# Patient Record
Sex: MALE | NOT HISPANIC OR LATINO | Employment: FULL TIME | URBAN - METROPOLITAN AREA
[De-identification: names, ages, dates, MRNs, and addresses within clinical notes are randomized per-mention and may not be internally consistent; named-entity substitution may affect disease eponyms.]

---

## 2023-08-22 ENCOUNTER — OFFICE VISIT (OUTPATIENT)
Dept: FAMILY MEDICINE CLINIC | Facility: CLINIC | Age: 43
End: 2023-08-22
Payer: COMMERCIAL

## 2023-08-22 VITALS
WEIGHT: 192 LBS | DIASTOLIC BLOOD PRESSURE: 70 MMHG | HEIGHT: 65 IN | BODY MASS INDEX: 31.99 KG/M2 | TEMPERATURE: 98 F | SYSTOLIC BLOOD PRESSURE: 118 MMHG

## 2023-08-22 DIAGNOSIS — F90.0 ATTENTION DEFICIT HYPERACTIVITY DISORDER (ADHD), PREDOMINANTLY INATTENTIVE TYPE: ICD-10-CM

## 2023-08-22 DIAGNOSIS — D22.9 NEVUS: ICD-10-CM

## 2023-08-22 DIAGNOSIS — Z00.00 ENCOUNTER FOR ANNUAL PHYSICAL EXAM: Primary | ICD-10-CM

## 2023-08-22 DIAGNOSIS — Z11.59 NEED FOR HEPATITIS C SCREENING TEST: ICD-10-CM

## 2023-08-22 DIAGNOSIS — Z12.11 ENCOUNTER FOR SCREENING FOR MALIGNANT NEOPLASM OF COLON: ICD-10-CM

## 2023-08-22 DIAGNOSIS — Z23 NEED FOR VACCINATION: ICD-10-CM

## 2023-08-22 DIAGNOSIS — N52.9 ERECTILE DYSFUNCTION, UNSPECIFIED ERECTILE DYSFUNCTION TYPE: ICD-10-CM

## 2023-08-22 RX ORDER — BUPRENORPHINE HYDROCHLORIDE AND NALOXONE HYDROCHLORIDE DIHYDRATE 8; 2 MG/1; MG/1
1 TABLET SUBLINGUAL DAILY
COMMUNITY

## 2023-08-22 RX ORDER — NALOXONE HYDROCHLORIDE 4 MG/.1ML
SPRAY NASAL
COMMUNITY
Start: 2023-07-13

## 2023-08-22 RX ORDER — BUPROPION HYDROCHLORIDE 150 MG/1
150 TABLET ORAL DAILY
Qty: 30 TABLET | Refills: 2 | Status: SHIPPED | OUTPATIENT
Start: 2023-08-22

## 2023-08-22 NOTE — PROGRESS NOTES
Subjective     Chief Complaint  Sexual Problem (Would like to discuss testosterone/ED/Would like to have some moles looked at and to discuss a colonoscopy and ADHD) and Immunizations (Tdap)    Subjective          Behzad Chi is a 43 y.o. male who presents today to [facility] for initial evaluation .    HPI:   History of Present Illness    Mr. Chi is a pleasant 43 year old male who presents today to establish care with a PCP.     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 He is still following with a suboxone clinic. Since being on the medication he has noted a severely decreased sex drive.     He reports some depressive symptoms that were improving since being out of long term for longer. He was also previously diagnosed with PTSD in the past. He has also had a previous diagnosis of ADHD when he was a kid. He is trying [facility] and has a lot of reading to do but this is difficult due to focus. He had issues with focus when he was in school, as well. When he is interested in a topic he has no trouble focusing and becomes hyper focused and has time blindness. He had one instance where he read a book for 22 hours straight. He also reports that he is concerned about a lot of the medications for ADHD due to his prior addictive potential.        The following portions of the patient's history were reviewed and updated as appropriate: allergies, current medications, past family history, past medical history, past social history, past surgical history and problem list.    Objective     Objective     Allergy:   No Known Allergies     Current Medications:   Current Outpatient Medications   Medication Sig Dispense Refill    buprenorphine-naloxone (SUBOXONE) 8-2 MG per SL tablet Place 1 tablet under the tongue Daily.      naloxone (NARCAN) 4 MG/0.1ML nasal spray       buPROPion XL (Wellbutrin XL) 150 MG  "24 hr tablet Take 1 tablet by mouth Daily. 30 tablet 2     No current facility-administered medications for this visit.       Past Medical History:  Past Medical History:   Diagnosis Date    ADHD     Depression     PTSD (post-traumatic stress disorder)        Past Surgical History:  Past Surgical History:   Procedure Laterality Date    APPENDECTOMY      CIRCUMCISION         Social History:  Social History     Socioeconomic History    Marital status: Legally    Tobacco Use    Smoking status: Every Day     Packs/day: 0.50     Years: 21.00     Pack years: 10.50     Types: Cigarettes     Passive exposure: Current    Smokeless tobacco: Never   Vaping Use    Vaping Use: Never used   Substance and Sexual Activity    Alcohol use: Never    Drug use: Never    Sexual activity: Defer       Family History:  Family History   Problem Relation Age of Onset    Diabetes Mother     Cancer Father     Other Father          Vital Signs:   /70   Temp 98 øF (36.7 øC) (Infrared)   Ht 165.1 cm (65\")   Wt 87.1 kg (192 lb)   BMI 31.95 kg/mý      Physical Exam:  Physical Exam  Constitutional:       Appearance: He is not ill-appearing.   Eyes:      Pupils: Pupils are equal, round, and reactive to light.   Cardiovascular:      Rate and Rhythm: Normal rate.      Pulses: Normal pulses.   Pulmonary:      Effort: Pulmonary effort is normal.      Breath sounds: Normal breath sounds.   Neurological:      General: No focal deficit present.      Mental Status: He is alert. Mental status is at baseline.   Psychiatric:         Mood and Affect: Mood normal.         Behavior: Behavior normal.         Thought Content: Thought content normal.         Judgment: Judgment normal.             PHQ-9 Score  PHQ-9 Total Score: 0     Lab Review  No visits with results within 3 Month(s) from this visit.   Latest known visit with results is:   No results found for any previous visit.        Radiology Results        Assessment / Plan         Assessment " and Plan   Diagnoses and all orders for this visit:    1. Encounter for annual physical exam (Primary)  Assessment & Plan:  Annual wellness exam completed today. Health Maintenance including immunizations was updated and reflected in the chart. Yearly screening labs were ordered.     Further recommendations to be given once lab data received.     Health advice: healthy food choices with fresh fruits and vegetables, maintain sleep pattern at least 8 hours, avoid texting and distracted driving practices; wear safety belt, engage in regular exercise, maintain healthy weight, use safe sex practices, avoid alcohol and illicit drugs. Maintain immunizations that are up to date. Maintain health maintenance: Colonoscopy, etc.  Follow up with PCP if struggling with depression or anxiety. Keep regular dental and eye exams. Brush and floss teeth daily.     F/U annually and prn.       Orders:  -     CBC & Differential; Future  -     Comprehensive Metabolic Panel; Future  -     Hemoglobin A1c; Future  -     TSH Rfx On Abnormal To Free T4; Future  -     Vitamin D,25-Hydroxy; Future  -     Vitamin B12; Future  -     Lipid Panel; Future    2. Need for vaccination  -     Tdap Vaccine Greater Than or Equal To 8yo IM    3. Encounter for screening for malignant neoplasm of colon  -     Ambulatory Referral For Screening Colonoscopy    4. Nevus  -     Ambulatory Referral to Dermatology    5. Erectile dysfunction, unspecified erectile dysfunction type  Assessment & Plan:  Possibly related to testosterone dysfunction.  Labs ordered for further evaluation    Orders:  -     Testosterone, Free, Total; Future  -     Testosterone; Future    6. Need for hepatitis C screening test  -     Hepatitis C antibody; Future    7. Attention deficit hyperactivity disorder (ADHD), predominantly inattentive type  Assessment & Plan:  Psychological condition is worsening.  Medication changes per orders.  Psychological condition  will be reassessed at the next  regular appointment.    Discussed with patient that Wellbutrin is proven effective in treatment of ADHD in adults.  Patient is not a good candidate for stimulants due to his history of substance abuse.  Medications and options discussed with patient in detail.  Patient voiced understanding of plan and was in agreement    Orders:  -     buPROPion XL (Wellbutrin XL) 150 MG 24 hr tablet; Take 1 tablet by mouth Daily.  Dispense: 30 tablet; Refill: 2        Discussed possible differential diagnoses, testing, treatment, recommended non-pharmacological interventions, risks, warning signs to monitor for that would indicate need for follow-up in clinic or ER. If no improvement with these regimens or you have new or worsening symptoms follow-up. Patient verbalizes understanding and agreement with plan of care. Denies further needs or concerns.     Patient was given instructions and counseling regarding her condition and for health maintenance advised.    BMI is >= 30 and <35. (Class 1 Obesity). The following options were offered after discussion;: weight loss educational material (shared in after visit summary)         Health Maintenance  Health Maintenance:   Health Maintenance Due   Topic Date Due    Pneumococcal Vaccine 0-64 (1 - PCV) Never done    HEPATITIS C SCREENING  Never done    COLORECTAL CANCER SCREENING  Never done        Meds ordered during this visit  New Medications Ordered This Visit   Medications    buPROPion XL (Wellbutrin XL) 150 MG 24 hr tablet     Sig: Take 1 tablet by mouth Daily.     Dispense:  30 tablet     Refill:  2       Meds stopped during this visit:  There are no discontinued medications.     Visit Diagnoses    ICD-10-CM ICD-9-CM   1. Encounter for annual physical exam  Z00.00 V70.0   2. Need for vaccination  Z23 V05.9   3. Encounter for screening for malignant neoplasm of colon  Z12.11 V76.51   4. Nevus  D22.9 216.9   5. Erectile dysfunction, unspecified erectile dysfunction type  N52.9 607.84    6. Need for hepatitis C screening test  Z11.59 V73.89   7. Attention deficit hyperactivity disorder (ADHD), predominantly inattentive type  F90.0 314.00       Patient was given instructions and counseling regarding his condition or for health maintenance advice. Please see specific information pulled into the AVS if appropriate.     Follow Up   Return in about 6 weeks (around 10/3/2023) for Recheck.        This document has been electronically signed by Cristiana Jackson DO   August 24, 2023 12:45 EDT    Dictated Utilizing Dragon Dictation: Part of this note may be an electronic transcription/translation of spoken language to printed text using the Dragon Dictation System.    Cristiana Jackson D.O.  [facility]

## 2023-08-24 ENCOUNTER — LAB (OUTPATIENT)
Dept: LAB | Facility: HOSPITAL | Age: 43
End: 2023-08-24
Payer: COMMERCIAL

## 2023-08-24 DIAGNOSIS — Z00.00 ENCOUNTER FOR ANNUAL PHYSICAL EXAM: ICD-10-CM

## 2023-08-24 DIAGNOSIS — Z11.59 NEED FOR HEPATITIS C SCREENING TEST: ICD-10-CM

## 2023-08-24 DIAGNOSIS — N52.9 ERECTILE DYSFUNCTION, UNSPECIFIED ERECTILE DYSFUNCTION TYPE: ICD-10-CM

## 2023-08-24 PROBLEM — F90.0 ATTENTION DEFICIT HYPERACTIVITY DISORDER (ADHD), PREDOMINANTLY INATTENTIVE TYPE: Status: ACTIVE | Noted: 2023-08-24

## 2023-08-24 LAB
25(OH)D3 SERPL-MCNC: 15.1 NG/ML (ref 30–100)
ALBUMIN SERPL-MCNC: 4.3 G/DL (ref 3.5–5.2)
ALBUMIN/GLOB SERPL: 1.9 G/DL
ALP SERPL-CCNC: 88 U/L (ref 39–117)
ALT SERPL W P-5'-P-CCNC: 32 U/L (ref 1–41)
ANION GAP SERPL CALCULATED.3IONS-SCNC: 12 MMOL/L (ref 5–15)
AST SERPL-CCNC: 24 U/L (ref 1–40)
BASOPHILS # BLD AUTO: 0.05 10*3/MM3 (ref 0–0.2)
BASOPHILS NFR BLD AUTO: 0.9 % (ref 0–1.5)
BILIRUB SERPL-MCNC: 0.3 MG/DL (ref 0–1.2)
BUN SERPL-MCNC: 10 MG/DL (ref 6–20)
BUN/CREAT SERPL: 10.4 (ref 7–25)
CALCIUM SPEC-SCNC: 9.3 MG/DL (ref 8.6–10.5)
CHLORIDE SERPL-SCNC: 104 MMOL/L (ref 98–107)
CHOLEST SERPL-MCNC: 225 MG/DL (ref 0–200)
CO2 SERPL-SCNC: 24 MMOL/L (ref 22–29)
CREAT SERPL-MCNC: 0.96 MG/DL (ref 0.76–1.27)
DEPRECATED RDW RBC AUTO: 38.9 FL (ref 37–54)
EGFRCR SERPLBLD CKD-EPI 2021: 100.6 ML/MIN/1.73
EOSINOPHIL # BLD AUTO: 0.09 10*3/MM3 (ref 0–0.4)
EOSINOPHIL NFR BLD AUTO: 1.5 % (ref 0.3–6.2)
ERYTHROCYTE [DISTWIDTH] IN BLOOD BY AUTOMATED COUNT: 12.9 % (ref 12.3–15.4)
GLOBULIN UR ELPH-MCNC: 2.3 GM/DL
GLUCOSE SERPL-MCNC: 102 MG/DL (ref 65–99)
HBA1C MFR BLD: 5.6 % (ref 4.8–5.6)
HCT VFR BLD AUTO: 39.9 % (ref 37.5–51)
HCV AB SER DONR QL: NORMAL
HDLC SERPL-MCNC: 43 MG/DL (ref 40–60)
HGB BLD-MCNC: 14.1 G/DL (ref 13–17.7)
IMM GRANULOCYTES # BLD AUTO: 0.02 10*3/MM3 (ref 0–0.05)
IMM GRANULOCYTES NFR BLD AUTO: 0.3 % (ref 0–0.5)
LDLC SERPL CALC-MCNC: 145 MG/DL (ref 0–100)
LDLC/HDLC SERPL: 3.28 {RATIO}
LYMPHOCYTES # BLD AUTO: 1.97 10*3/MM3 (ref 0.7–3.1)
LYMPHOCYTES NFR BLD AUTO: 33.6 % (ref 19.6–45.3)
MCH RBC QN AUTO: 30.2 PG (ref 26.6–33)
MCHC RBC AUTO-ENTMCNC: 35.3 G/DL (ref 31.5–35.7)
MCV RBC AUTO: 85.4 FL (ref 79–97)
MONOCYTES # BLD AUTO: 0.53 10*3/MM3 (ref 0.1–0.9)
MONOCYTES NFR BLD AUTO: 9 % (ref 5–12)
NEUTROPHILS NFR BLD AUTO: 3.21 10*3/MM3 (ref 1.7–7)
NEUTROPHILS NFR BLD AUTO: 54.7 % (ref 42.7–76)
NRBC BLD AUTO-RTO: 0 /100 WBC (ref 0–0.2)
PLATELET # BLD AUTO: 223 10*3/MM3 (ref 140–450)
PMV BLD AUTO: 10.1 FL (ref 6–12)
POTASSIUM SERPL-SCNC: 4.1 MMOL/L (ref 3.5–5.2)
PROT SERPL-MCNC: 6.6 G/DL (ref 6–8.5)
RBC # BLD AUTO: 4.67 10*6/MM3 (ref 4.14–5.8)
SODIUM SERPL-SCNC: 140 MMOL/L (ref 136–145)
TRIGL SERPL-MCNC: 205 MG/DL (ref 0–150)
TSH SERPL DL<=0.05 MIU/L-ACNC: 1.88 UIU/ML (ref 0.27–4.2)
VIT B12 BLD-MCNC: 677 PG/ML (ref 211–946)
VLDLC SERPL-MCNC: 37 MG/DL (ref 5–40)
WBC NRBC COR # BLD: 5.87 10*3/MM3 (ref 3.4–10.8)

## 2023-08-24 PROCEDURE — 80050 GENERAL HEALTH PANEL: CPT

## 2023-08-24 PROCEDURE — 86803 HEPATITIS C AB TEST: CPT

## 2023-08-24 PROCEDURE — 84402 ASSAY OF FREE TESTOSTERONE: CPT

## 2023-08-24 PROCEDURE — 82607 VITAMIN B-12: CPT

## 2023-08-24 PROCEDURE — 80061 LIPID PANEL: CPT

## 2023-08-24 PROCEDURE — 83036 HEMOGLOBIN GLYCOSYLATED A1C: CPT

## 2023-08-24 PROCEDURE — 36415 COLL VENOUS BLD VENIPUNCTURE: CPT

## 2023-08-24 PROCEDURE — 82306 VITAMIN D 25 HYDROXY: CPT

## 2023-08-24 PROCEDURE — 84403 ASSAY OF TOTAL TESTOSTERONE: CPT

## 2023-08-24 NOTE — ASSESSMENT & PLAN NOTE
Psychological condition is worsening.  Medication changes per orders.  Psychological condition  will be reassessed at the next regular appointment.    Discussed with patient that Wellbutrin is proven effective in treatment of ADHD in adults.  Patient is not a good candidate for stimulants due to his history of substance abuse.  Medications and options discussed with patient in detail.  Patient voiced understanding of plan and was in agreement

## 2023-08-24 NOTE — ASSESSMENT & PLAN NOTE
Annual wellness exam completed today. Health Maintenance including immunizations was updated and reflected in the chart. Yearly screening labs were ordered.     Further recommendations to be given once lab data received.     Health advice: healthy food choices with fresh fruits and vegetables, maintain sleep pattern at least 8 hours, avoid texting and distracted driving practices; wear safety belt, engage in regular exercise, maintain healthy weight, use safe sex practices, avoid alcohol and illicit drugs. Maintain immunizations that are up to date. Maintain health maintenance: Colonoscopy, etc.  Follow up with PCP if struggling with depression or anxiety. Keep regular dental and eye exams. Brush and floss teeth daily.     F/U annually and prn.

## 2023-08-30 ENCOUNTER — TELEPHONE (OUTPATIENT)
Dept: FAMILY MEDICINE CLINIC | Facility: CLINIC | Age: 43
End: 2023-08-30

## 2023-08-30 NOTE — TELEPHONE ENCOUNTER
Advised colonoscopy is waiting to be schedule with gastro, and derm [facility] to [facility], it does take some time to get in.

## 2023-08-30 NOTE — TELEPHONE ENCOUNTER
Caller: Behzad Chi    Relationship: Self    Best call back number:      Caller requesting test results: ALI    What test was performed: LABS    When was the test performed: [id number]    Where was the test performed: [facility]    Additional notes: PATIENT WOULD LIKE TO GO OVER RESULTS

## 2023-08-30 NOTE — TELEPHONE ENCOUNTER
Caller: Behzad Chi    Relationship: Self    Best call back number:      What is the medical concern/diagnosis: SCREENING COLONOSCOPY    What specialty or service is being requested: GASTROENTEROLOGY      Any additional details: PATIENT HASN'T HEARD ANYTHING BACK ON GETTING THIS SCHEDULED    ALSO PATIENT MENTIONED HE WAS TO HAVE A REFERRAL TO DERMATOLOGY TO HAVE SOME MOLES LOOKED AT AS WELL

## 2023-08-31 NOTE — TELEPHONE ENCOUNTER
"Provider: KATIANA JEAN DO    Caller: TONI DE DIOS    Relationship to Patient: SELF    Phone Number: [phone]     Reason for Call: RETURNING CALL TO THE OFFICE. HUB HAS RELAYED,   \"HUB TO READ\" MESSAGE TO PATIENT. PATIENT VERBALIZED UNDERSTANDING .    -PATIENT WANTED TO KNOW IF HE COULD TAKE A MULTIVITAMIN?    - PATIENT HAS BEEN HAVING ISSUES WITH HIS TESTOSTERONE AND WANTED TO KNOW WHAT HIS LEVELS WERE?  "

## 2023-08-31 NOTE — TELEPHONE ENCOUNTER
Okay for the Hub to read:     Labs were stable.  His cholesterol is a little elevated but not enough to need medication for at this time.  He should decrease his fried food intake etc.  We will repeat his labs in 6 months.  If the cholesterol still elevated then we will discuss medications at that time.  His vitamin D was also a little low.  He should take 2000 units of vitamin D daily, he can get this over-the-counter.      I called the pt and LVM advising to call the office.

## 2023-09-01 LAB
TESTOST FREE SERPL-MCNC: 4.2 PG/ML (ref 6.8–21.5)
TESTOST SERPL-MCNC: 222 NG/DL (ref 264–916)

## 2023-10-05 ENCOUNTER — TELEPHONE (OUTPATIENT)
Dept: FAMILY MEDICINE CLINIC | Facility: CLINIC | Age: 43
End: 2023-10-05

## 2023-10-06 ENCOUNTER — OFFICE VISIT (OUTPATIENT)
Dept: FAMILY MEDICINE CLINIC | Facility: CLINIC | Age: 43
End: 2023-10-06
Payer: COMMERCIAL

## 2023-10-06 ENCOUNTER — TELEPHONE (OUTPATIENT)
Dept: FAMILY MEDICINE CLINIC | Facility: CLINIC | Age: 43
End: 2023-10-06

## 2023-10-06 VITALS
TEMPERATURE: 97.7 F | DIASTOLIC BLOOD PRESSURE: 78 MMHG | HEIGHT: 65 IN | WEIGHT: 197.7 LBS | HEART RATE: 76 BPM | SYSTOLIC BLOOD PRESSURE: 106 MMHG | BODY MASS INDEX: 32.94 KG/M2

## 2023-10-06 DIAGNOSIS — F90.0 ATTENTION DEFICIT HYPERACTIVITY DISORDER (ADHD), PREDOMINANTLY INATTENTIVE TYPE: Primary | ICD-10-CM

## 2023-10-06 DIAGNOSIS — R79.89 LOW TESTOSTERONE IN MALE: ICD-10-CM

## 2023-10-06 DIAGNOSIS — Z51.81 THERAPEUTIC DRUG MONITORING: ICD-10-CM

## 2023-10-06 RX ORDER — TESTOSTERONE ENANTHATE 200 MG/ML
200 INJECTION, SOLUTION INTRAMUSCULAR
Qty: 2 ML | Refills: 0 | Status: SHIPPED | OUTPATIENT
Start: 2023-10-06

## 2023-10-06 RX ORDER — DEXTROAMPHETAMINE SACCHARATE, AMPHETAMINE ASPARTATE, DEXTROAMPHETAMINE SULFATE AND AMPHETAMINE SULFATE 1.25; 1.25; 1.25; 1.25 MG/1; MG/1; MG/1; MG/1
5 TABLET ORAL 2 TIMES DAILY
Qty: 60 TABLET | Refills: 0 | Status: SHIPPED | OUTPATIENT
Start: 2023-10-06

## 2023-10-06 NOTE — TELEPHONE ENCOUNTER
Caller: Behzad Chi    Relationship: Self    Best call back number: [phone]    What orders are you requesting (i.e. lab or imaging): PRIOR AUTHORIZATION FOR THE ADDERALL      Additional notes: PATIENT WAS SEEN TODAY AND PRESCRIBED THE ADDERALL. PHARMACY STATES DR JEAN NEEDS TO SUPPLY FURTHER DOCUMENTATION AS TO WHY PATIENT NEEDS THIS MEDICATION

## 2023-10-06 NOTE — ASSESSMENT & PLAN NOTE
Psychological condition is worsening.  Medication changes per orders.  Psychological condition  will be reassessed at the next regular appointment.  We had a long discussion about medications for ADHD.  He reports that he has done some research about the medications for ADHD.  He no longer feels like he is concerned about the addictive potential of stimulants.    The patient is taking the following controlled substance(s) on a long term (greater than three months) basis: Amphetamine/dextroamphetamine (Adderall).  He is taking controlled substances for other (adhd).  A history was obtained from the patient and the following were reviewed: family history, social history, psychiatric history, and substance abuse history.  A baseline assessment was performed and includes a controlled substance agreement, urine drug screen, TOM (within 12 months prior to today's encounter), and a physical exam, if appropriate, was performed within the past year.  It is medically appropriate to prescribe him the medication(s) above.  If applicable, prior treatment records were obtained and reviewed to justify long term prescribing of controlled substances.  The patient was educated on the following: Limited use of the medication, need to discontinue the medication when his condition resolves, proper storage and disposal of medication(s), and risks and dangers associated with controlled substances including tolerance, dependence, and addiction.  A written informed consent was obtained and includes objectives of treatment, further diagnostic testing if appropriate, and an exit strategy for discontinuing the medication on the condition resolves, if appropriate.  In addition, if appropriate, the patient will be screened for other medical conditions that may impact the prescribing of controlled substances.  Previous treatments have been tried including trials of noncontrolled substances or lower doses of controlled substances.  The  controlled medication(s) have been titrated to the level appropriate and necessary and the medication(s) are not causing unacceptable side effects.

## 2023-10-06 NOTE — PROGRESS NOTES
Subjective     Chief Complaint  Medication Reaction (Pt states that he is having reactions to Wellbutrin and would like to switch. /Lab results)    Subjective          Behzad Chi is a 43 y.o. male who presents today to [facility] for initial evaluation .    HPI:   Medication Reaction      Mr. Chi is a pleasant 43 year old male who presents today to follow up.     He reports that he was in [employer] for the last 7 years and he has been taking suboxone due to heroin addiction. He is still following with a suboxone clinic. Since being on the medication he has noted a severely decreased sex drive.     He reports some depressive symptoms that were improving since being out of [employer] for longer. He was also previously diagnosed with PTSD in the past.     He has also had a previous diagnosis of ADHD when he was a kid. He is trying Emu Messenger and has a lot of reading to do but this is difficult due to focus. He had issues with focus when he was in school, as well. When he is interested in a topic he has no trouble focusing and becomes hyper focused and has time blindness. He had one instance where he read a book for 22 hours straight. He also reports that he is concerned about a lot of the medications for ADHD due to his prior addictive potential.   --- We tried Wellbutrin.  He reports sexual side effects with this and that his symptoms did not improve.  He also had nausea.    The following portions of the patient's history were reviewed and updated as appropriate: allergies, current medications, past family history, past medical history, past social history, past surgical history and problem list.    Objective     Objective     Allergy:   No Known Allergies     Current Medications:   Current Outpatient Medications   Medication Sig Dispense Refill    buprenorphine-naloxone (SUBOXONE) 8-2 MG per SL tablet Place 1 tablet under the tongue Daily.      naloxone (NARCAN) 4  "MG/0.1ML nasal spray       amphetamine-dextroamphetamine (Adderall) 5 MG tablet Take 1 tablet by mouth 2 (Two) Times a Day. 60 tablet 0    Testosterone Enanthate 200 MG/ML solution Inject 200 mg into the appropriate muscle as directed by prescriber Every 14 (Fourteen) Days. 2 mL 0     No current facility-administered medications for this visit.       Past Medical History:  Past Medical History:   Diagnosis Date    ADHD     Depression     PTSD (post-traumatic stress disorder)        Past Surgical History:  Past Surgical History:   Procedure Laterality Date    APPENDECTOMY      CIRCUMCISION         Social History:  Social History     Socioeconomic History    Marital status: Legally    Tobacco Use    Smoking status: Every Day     Packs/day: 0.50     Years: 21.00     Pack years: 10.50     Types: Cigarettes     Passive exposure: Current    Smokeless tobacco: Never   Vaping Use    Vaping Use: Never used   Substance and Sexual Activity    Alcohol use: Never    Drug use: Never    Sexual activity: Defer       Family History:  Family History   Problem Relation Age of Onset    Diabetes Mother     Cancer Father     Other Father          Vital Signs:   /78   Pulse 76   Temp 97.7 °F (36.5 °C) (Infrared)   Ht 165.1 cm (65\")   Wt 89.7 kg (197 lb 11.2 oz)   BMI 32.90 kg/m²      Physical Exam:  Physical Exam  Constitutional:       Appearance: He is not ill-appearing.   Eyes:      Pupils: Pupils are equal, round, and reactive to light.   Cardiovascular:      Rate and Rhythm: Normal rate.      Pulses: Normal pulses.   Pulmonary:      Effort: Pulmonary effort is normal.      Breath sounds: Normal breath sounds.   Neurological:      General: No focal deficit present.      Mental Status: He is alert. Mental status is at baseline.   Psychiatric:         Mood and Affect: Mood normal.         Behavior: Behavior normal.         Thought Content: Thought content normal.         Judgment: Judgment normal.             PHQ-9 " Score  PHQ-9 Total Score:       Lab Review  Lab on 08/24/2023   Component Date Value Ref Range Status    Glucose 08/24/2023 102 (H)  65 - 99 mg/dL Final    BUN 08/24/2023 10  6 - 20 mg/dL Final    Creatinine 08/24/2023 0.96  0.76 - 1.27 mg/dL Final    Sodium 08/24/2023 140  136 - 145 mmol/L Final    Potassium 08/24/2023 4.1  3.5 - 5.2 mmol/L Final    Chloride 08/24/2023 104  98 - 107 mmol/L Final    CO2 08/24/2023 24.0  22.0 - 29.0 mmol/L Final    Calcium 08/24/2023 9.3  8.6 - 10.5 mg/dL Final    Total Protein 08/24/2023 6.6  6.0 - 8.5 g/dL Final    Albumin 08/24/2023 4.3  3.5 - 5.2 g/dL Final    ALT (SGPT) 08/24/2023 32  1 - 41 U/L Final    AST (SGOT) 08/24/2023 24  1 - 40 U/L Final    Alkaline Phosphatase 08/24/2023 88  39 - 117 U/L Final    Total Bilirubin 08/24/2023 0.3  0.0 - 1.2 mg/dL Final    Globulin 08/24/2023 2.3  gm/dL Final    A/G Ratio 08/24/2023 1.9  g/dL Final    BUN/Creatinine Ratio 08/24/2023 10.4  7.0 - 25.0 Final    Anion Gap 08/24/2023 12.0  5.0 - 15.0 mmol/L Final    eGFR 08/24/2023 100.6  >60.0 mL/min/1.73 Final    Hemoglobin A1C 08/24/2023 5.60  4.80 - 5.60 % Final    TSH 08/24/2023 1.880  0.270 - 4.200 uIU/mL Final    25 Hydroxy, Vitamin D 08/24/2023 15.1 (L)  30.0 - 100.0 ng/ml Final    Vitamin B-12 08/24/2023 677  211 - 946 pg/mL Final    Total Cholesterol 08/24/2023 225 (H)  0 - 200 mg/dL Final    Triglycerides 08/24/2023 205 (H)  0 - 150 mg/dL Final    HDL Cholesterol 08/24/2023 43  40 - 60 mg/dL Final    LDL Cholesterol  08/24/2023 145 (H)  0 - 100 mg/dL Final    VLDL Cholesterol 08/24/2023 37  5 - 40 mg/dL Final    LDL/HDL Ratio 08/24/2023 3.28   Final    Testosterone, Total 08/24/2023 222 (L)  264 - 916 ng/dL Final    Adult male reference interval is based on a population of  healthy nonobese males (BMI <30) between 19 and 39 years old.  Liane et.al. JCEM 2017,102;1045-0401. PMID: [id number].    Testosterone, Free 08/24/2023 4.2 (L)  6.8 - 21.5 pg/mL Final    Hepatitis C Ab  08/24/2023 Non-Reactive  Non-Reactive Final    WBC 08/24/2023 5.87  3.40 - 10.80 10*3/mm3 Final    RBC 08/24/2023 4.67  4.14 - 5.80 10*6/mm3 Final    Hemoglobin 08/24/2023 14.1  13.0 - 17.7 g/dL Final    Hematocrit 08/24/2023 39.9  37.5 - 51.0 % Final    MCV 08/24/2023 85.4  79.0 - 97.0 fL Final    MCH 08/24/2023 30.2  26.6 - 33.0 pg Final    MCHC 08/24/2023 35.3  31.5 - 35.7 g/dL Final    RDW 08/24/2023 12.9  12.3 - 15.4 % Final    RDW-SD 08/24/2023 38.9  37.0 - 54.0 fl Final    MPV 08/24/2023 10.1  6.0 - 12.0 fL Final    Platelets 08/24/2023 223  140 - 450 10*3/mm3 Final    Neutrophil % 08/24/2023 54.7  42.7 - 76.0 % Final    Lymphocyte % 08/24/2023 33.6  19.6 - 45.3 % Final    Monocyte % 08/24/2023 9.0  5.0 - 12.0 % Final    Eosinophil % 08/24/2023 1.5  0.3 - 6.2 % Final    Basophil % 08/24/2023 0.9  0.0 - 1.5 % Final    Immature Grans % 08/24/2023 0.3  0.0 - 0.5 % Final    Neutrophils, Absolute 08/24/2023 3.21  1.70 - 7.00 10*3/mm3 Final    Lymphocytes, Absolute 08/24/2023 1.97  0.70 - 3.10 10*3/mm3 Final    Monocytes, Absolute 08/24/2023 0.53  0.10 - 0.90 10*3/mm3 Final    Eosinophils, Absolute 08/24/2023 0.09  0.00 - 0.40 10*3/mm3 Final    Basophils, Absolute 08/24/2023 0.05  0.00 - 0.20 10*3/mm3 Final    Immature Grans, Absolute 08/24/2023 0.02  0.00 - 0.05 10*3/mm3 Final    [facility] 08/24/2023 0.0  0.0 - 0.2 /100 WBC Final        Radiology Results        Assessment / Plan         Assessment and Plan   Diagnoses and all orders for this visit:    1. Attention deficit hyperactivity disorder (ADHD), predominantly inattentive type (Primary)  Assessment & Plan:  Psychological condition is worsening.  Medication changes per orders.  Psychological condition  will be reassessed at the next regular appointment.  We had a long discussion about medications for ADHD.  He reports that he has done some research about the medications for ADHD.  He no longer feels like he is concerned about the addictive potential of  stimulants.    The patient is taking the following controlled substance(s) on a long term (greater than three months) basis: Amphetamine/dextroamphetamine (Adderall).  He is taking controlled substances for other (adhd).  A history was obtained from the patient and the following were reviewed: family history, social history, psychiatric history, and substance abuse history.  A baseline assessment was performed and includes a controlled substance agreement, urine drug screen, TOM (within 12 months prior to today's encounter), and a physical exam, if appropriate, was performed within the past year.  It is medically appropriate to prescribe him the medication(s) above.  If applicable, prior treatment records were obtained and reviewed to justify long term prescribing of controlled substances.  The patient was educated on the following: Limited use of the medication, need to discontinue the medication when his condition resolves, proper storage and disposal of medication(s), and risks and dangers associated with controlled substances including tolerance, dependence, and addiction.  A written informed consent was obtained and includes objectives of treatment, further diagnostic testing if appropriate, and an exit strategy for discontinuing the medication on the condition resolves, if appropriate.  In addition, if appropriate, the patient will be screened for other medical conditions that may impact the prescribing of controlled substances.  Previous treatments have been tried including trials of noncontrolled substances or lower doses of controlled substances.  The controlled medication(s) have been titrated to the level appropriate and necessary and the medication(s) are not causing unacceptable side effects.        Orders:  -     amphetamine-dextroamphetamine (Adderall) 5 MG tablet; Take 1 tablet by mouth 2 (Two) Times a Day.  Dispense: 60 tablet; Refill: 0    2. Low testosterone in male  -     Testosterone  Enanthate 200 MG/ML solution; Inject 200 mg into the appropriate muscle as directed by prescriber Every 14 (Fourteen) Days.  Dispense: 2 mL; Refill: 0    3. Therapeutic drug monitoring  -     Compliance Drug Analysis, Ur - Urine, Clean Catch; Future        Discussed possible differential diagnoses, testing, treatment, recommended non-pharmacological interventions, risks, warning signs to monitor for that would indicate need for follow-up in clinic or ER. If no improvement with these regimens or you have new or worsening symptoms follow-up. Patient verbalizes understanding and agreement with plan of care. Denies further needs or concerns.     Patient was given instructions and counseling regarding her condition and for health maintenance advised.      Health Maintenance  Health Maintenance:   Health Maintenance Due   Topic Date Due    COLORECTAL CANCER SCREENING  Never done        Meds ordered during this visit  New Medications Ordered This Visit   Medications    Testosterone Enanthate 200 MG/ML solution     Sig: Inject 200 mg into the appropriate muscle as directed by prescriber Every 14 (Fourteen) Days.     Dispense:  2 mL     Refill:  0    amphetamine-dextroamphetamine (Adderall) 5 MG tablet     Sig: Take 1 tablet by mouth 2 (Two) Times a Day.     Dispense:  60 tablet     Refill:  0       Meds stopped during this visit:  Medications Discontinued During This Encounter   Medication Reason    buPROPion XL (Wellbutrin XL) 150 MG 24 hr tablet         Visit Diagnoses    ICD-10-CM ICD-9-CM   1. Attention deficit hyperactivity disorder (ADHD), predominantly inattentive type  F90.0 314.00   2. Low testosterone in male  R79.89 790.99   3. Therapeutic drug monitoring  Z51.81 V58.83       Patient was given instructions and counseling regarding his condition or for health maintenance advice. Please see specific information pulled into the AVS if appropriate.     Follow Up   Return in about 4 weeks (around 11/3/2023) for  Recheck.        This document has been electronically signed by Cristiana Jackson DO   October 6, 2023 13:07 EDT    Dictated Utilizing Dragon Dictation: Part of this note may be an electronic transcription/translation of spoken language to printed text using the Dragon Dictation System.    Cristiana Jackson D.O.  [facility]

## 2023-10-09 ENCOUNTER — TELEPHONE (OUTPATIENT)
Dept: FAMILY MEDICINE CLINIC | Facility: CLINIC | Age: 43
End: 2023-10-09

## 2023-10-09 ENCOUNTER — PRIOR AUTHORIZATION (OUTPATIENT)
Dept: FAMILY MEDICINE CLINIC | Facility: CLINIC | Age: 43
End: 2023-10-09
Payer: COMMERCIAL

## 2023-10-09 NOTE — TELEPHONE ENCOUNTER
Caller: [facility] [id number] - [place] [address] [address] [place]  AT [facility] [address] - [phone]  - [phone] FX    Relationship: Pharmacy        What medications are you currently taking:   Current Outpatient Medications on File Prior to Visit   Medication Sig Dispense Refill    amphetamine-dextroamphetamine (Adderall) 5 MG tablet Take 1 tablet by mouth 2 (Two) Times a Day. 60 tablet 0    buprenorphine-naloxone (SUBOXONE) 8-2 MG per SL tablet Place 1 tablet under the tongue Daily.      naloxone (NARCAN) 4 MG/0.1ML nasal spray       Testosterone Enanthate 200 MG/ML solution Inject 200 mg into the appropriate muscle as directed by prescriber Every 14 (Fourteen) Days. 2 mL 0     No current facility-administered medications on file prior to visit.        What are your concerns: STATED THAT RX     Testosterone Enanthate 200 MG/ML solution      ONLY COME IN 5ML AND WANTED TO KNOW IF RX WAS SUPPOSE TO BE SOMETHING

## 2023-10-09 NOTE — TELEPHONE ENCOUNTER
1013/23    PA has been approved      10/9/23    PA has been started and sent to [facility]: [id number] - Rx #: [id number]  Drug  Amphetamine-Dextroamphetamine 5MG tablets

## 2023-10-13 LAB — DRUGS UR: NORMAL

## 2023-10-13 RX ORDER — SODIUM, POTASSIUM,MAG SULFATES 17.5-3.13G
2 SOLUTION, RECONSTITUTED, ORAL ORAL TAKE AS DIRECTED
Qty: 354 ML | Refills: 0 | Status: SHIPPED | OUTPATIENT
Start: 2023-10-13

## 2023-10-16 ENCOUNTER — OUTSIDE FACILITY SERVICE (OUTPATIENT)
Dept: GASTROENTEROLOGY | Facility: CLINIC | Age: 43
End: 2023-10-16
Payer: COMMERCIAL

## 2023-10-16 ENCOUNTER — LAB REQUISITION (OUTPATIENT)
Dept: LAB | Facility: HOSPITAL | Age: 43
End: 2023-10-16
Payer: COMMERCIAL

## 2023-10-16 DIAGNOSIS — D12.2 BENIGN NEOPLASM OF ASCENDING COLON: ICD-10-CM

## 2023-10-16 DIAGNOSIS — Z80.0 FAMILY HISTORY OF MALIGNANT NEOPLASM OF DIGESTIVE ORGANS: ICD-10-CM

## 2023-10-16 DIAGNOSIS — Z12.11 ENCOUNTER FOR SCREENING FOR MALIGNANT NEOPLASM OF COLON: ICD-10-CM

## 2023-10-16 PROCEDURE — 88305 TISSUE EXAM BY PATHOLOGIST: CPT | Performed by: INTERNAL MEDICINE

## 2023-10-16 PROCEDURE — 45385 COLONOSCOPY W/LESION REMOVAL: CPT | Performed by: INTERNAL MEDICINE

## 2023-10-17 LAB
CYTO UR: NORMAL
LAB AP CASE REPORT: NORMAL
LAB AP CLINICAL INFORMATION: NORMAL
PATH REPORT.FINAL DX SPEC: NORMAL
PATH REPORT.GROSS SPEC: NORMAL

## 2023-10-19 ENCOUNTER — TELEPHONE (OUTPATIENT)
Dept: FAMILY MEDICINE CLINIC | Facility: CLINIC | Age: 43
End: 2023-10-19

## 2023-10-19 RX ORDER — SILDENAFIL 50 MG/1
50 TABLET, FILM COATED ORAL DAILY PRN
Qty: 30 TABLET | Refills: 0 | Status: SHIPPED | OUTPATIENT
Start: 2023-10-19

## 2023-10-19 NOTE — TELEPHONE ENCOUNTER
Caller: Behzad Chi    Relationship: Self    Best call back number: [phone]     What medication are you requesting: SOMETHING FOR ERECTILE DISFUNCTION    What are your current symptoms: ISSUES WITH ED    How long have you been experiencing symptoms: UNSURE     Have you had these symptoms before:    [x] Yes  [] No    Have you been treated for these symptoms before:   [x] Yes  [] No    If a prescription is needed, what is your preferred pharmacy and phone number: [facility] [id number] [place] [address] AT [facility] [address]  - [phone]  - [phone] FX     Additional notes:SPOKE PREVIOUSLY ABOUT ED ISSUES. TESTOSORONE IS NOT HELPING. WOULD LIKE SOME MEDICATION HELP. PLEASE CALL TO LET HIM KNOW IF SOMETHING IS CALLED IN OR NOT

## 2023-10-30 NOTE — TELEPHONE ENCOUNTER
Caller: Behzad Chi    Relationship: Self    Best call back number: [phone]     Requested Prescriptions:   Requested Prescriptions     Pending Prescriptions Disp Refills    sildenafil (Viagra) 50 MG tablet 30 tablet 0     Sig: Take 1 tablet by mouth Daily As Needed for Erectile Dysfunction.        Pharmacy where request should be sent: [facility] [id number] [address]  AT [facility] [address] - [phone]  - [phone] FX     Last office visit with prescribing clinician: 10/6/2023   Last telemedicine visit with prescribing clinician: Visit date not found   Next office visit with prescribing clinician: Visit date not found     Additional details provided by patient: PATIENT STATES HE NEEDS 2 PILLS TO GET A RESPONSE.  WOULD LIKE A DOSAGE INCREASE.      Does the patient have less than a 3 day supply:  [x] Yes  [] No    Would you like a call back once the refill request has been completed: [x] Yes [] No    If the office needs to give you a call back, can they leave a voicemail: [x] Yes [] No    Elayne Grey   10/30/23 09:06 EDT

## 2023-10-31 RX ORDER — SILDENAFIL 50 MG/1
50 TABLET, FILM COATED ORAL DAILY PRN
Qty: 30 TABLET | Refills: 0 | Status: SHIPPED | OUTPATIENT
Start: 2023-10-31 | End: 2023-11-03 | Stop reason: SDUPTHER

## 2023-11-02 ENCOUNTER — TELEPHONE (OUTPATIENT)
Dept: FAMILY MEDICINE CLINIC | Facility: CLINIC | Age: 43
End: 2023-11-02

## 2023-11-02 NOTE — TELEPHONE ENCOUNTER
Pt called again. He wants to take viagra at 100mg dose.    He said he has to take 2 50mg tablets for it to work.    Please consider writing viagra 100mg daily as needed.      Call patient can leave message  [phone]     [facility]  Phone: [phone] Fax: [phone]

## 2023-11-03 RX ORDER — SILDENAFIL 100 MG/1
100 TABLET, FILM COATED ORAL DAILY PRN
Qty: 30 TABLET | Refills: 2 | Status: SHIPPED | OUTPATIENT
Start: 2023-11-03

## 2023-11-16 ENCOUNTER — OFFICE VISIT (OUTPATIENT)
Dept: FAMILY MEDICINE CLINIC | Facility: CLINIC | Age: 43
End: 2023-11-16
Payer: COMMERCIAL

## 2023-11-16 VITALS
SYSTOLIC BLOOD PRESSURE: 114 MMHG | BODY MASS INDEX: 32.99 KG/M2 | HEART RATE: 74 BPM | HEIGHT: 65 IN | WEIGHT: 198 LBS | DIASTOLIC BLOOD PRESSURE: 70 MMHG | TEMPERATURE: 98 F

## 2023-11-16 DIAGNOSIS — F90.0 ATTENTION DEFICIT HYPERACTIVITY DISORDER (ADHD), PREDOMINANTLY INATTENTIVE TYPE: Primary | ICD-10-CM

## 2023-11-16 DIAGNOSIS — R79.89 LOW TESTOSTERONE IN MALE: ICD-10-CM

## 2023-11-16 PROCEDURE — 1159F MED LIST DOCD IN RCRD: CPT | Performed by: FAMILY MEDICINE

## 2023-11-16 PROCEDURE — 1160F RVW MEDS BY RX/DR IN RCRD: CPT | Performed by: FAMILY MEDICINE

## 2023-11-16 PROCEDURE — 99214 OFFICE O/P EST MOD 30 MIN: CPT | Performed by: FAMILY MEDICINE

## 2023-11-16 RX ORDER — TESTOSTERONE ENANTHATE 200 MG/ML
200 INJECTION, SOLUTION INTRAMUSCULAR
Qty: 2 ML | Refills: 0 | Status: SHIPPED | OUTPATIENT
Start: 2023-11-16

## 2023-11-16 RX ORDER — DEXTROAMPHETAMINE SACCHARATE, AMPHETAMINE ASPARTATE, DEXTROAMPHETAMINE SULFATE AND AMPHETAMINE SULFATE 3.75; 3.75; 3.75; 3.75 MG/1; MG/1; MG/1; MG/1
15 TABLET ORAL DAILY
Qty: 30 TABLET | Refills: 0 | Status: SHIPPED | OUTPATIENT
Start: 2023-11-16

## 2023-11-16 RX ORDER — DEXTROAMPHETAMINE SACCHARATE, AMPHETAMINE ASPARTATE, DEXTROAMPHETAMINE SULFATE AND AMPHETAMINE SULFATE 1.25; 1.25; 1.25; 1.25 MG/1; MG/1; MG/1; MG/1
TABLET ORAL
Qty: 30 TABLET | Refills: 0 | Status: SHIPPED | OUTPATIENT
Start: 2023-11-16

## 2023-11-16 NOTE — PROGRESS NOTES
Subjective     Chief Complaint  ADHD (Would like to increase adderall)    Subjective          Behzad Chi is a 43 y.o. male who presents today to [facility] for initial evaluation .    HPI:   Mr. Chi is a pleasant 43 year old male who presents today to follow up on ADHD.  He also needs a refill on testosterone.      He was started on Adderall 5 mg BID. He noticed minimal difference. He started taking 10 mg in the AM and noted more improvement in symptoms.  He states that when in school in the mornings is when he feels he needs to focus the most. He works in the evenings and needs less intensive focus during those times.     The following portions of the patient's history were reviewed and updated as appropriate: allergies, current medications, past family history, past medical history, past social history, past surgical history and problem list.    Objective     Objective     Allergy:   No Known Allergies     Current Medications:   Current Outpatient Medications   Medication Sig Dispense Refill    buprenorphine-naloxone (SUBOXONE) 8-2 MG per SL tablet Place 1 tablet under the tongue Daily.      naloxone (NARCAN) 4 MG/0.1ML nasal spray       sildenafil (Viagra) 100 MG tablet Take 1 tablet by mouth Daily As Needed for Erectile Dysfunction. 30 tablet 2    sodium-potassium-magnesium sulfates (Suprep Bowel Prep Kit) 17.5-3.13-1.6 GM/177ML solution oral solution Take 2 bottles by mouth Take As Directed. 354 mL 0    Testosterone Enanthate 200 MG/ML solution Inject 200 mg into the appropriate muscle as directed by prescriber Every 14 (Fourteen) Days. 2 mL 0    amphetamine-dextroamphetamine (Adderall) 15 MG tablet Take 1 tablet by mouth Daily. 30 tablet 0    amphetamine-dextroamphetamine (Adderall) 5 MG tablet Take 1 pill orally every afternoon 30 tablet 0     No current facility-administered medications for this visit.       Past Medical History:  Past Medical History:  "  Diagnosis Date    ADHD     Depression     PTSD (post-traumatic stress disorder)        Past Surgical History:  Past Surgical History:   Procedure Laterality Date    APPENDECTOMY      CIRCUMCISION         Social History:  Social History     Socioeconomic History    Marital status: Legally    Tobacco Use    Smoking status: Every Day     Packs/day: 0.50     Years: 21.00     Additional pack years: 0.00     Total pack years: 10.50     Types: Cigarettes     Passive exposure: Current    Smokeless tobacco: Never   Vaping Use    Vaping Use: Never used   Substance and Sexual Activity    Alcohol use: Never    Drug use: Defer    Sexual activity: Defer       Family History:  Family History   Problem Relation Age of Onset    Diabetes Mother     Cancer Father     Other Father          Vital Signs:   /70   Pulse 74   Temp 98 °F (36.7 °C) (Infrared)   Ht 165.1 cm (65\")   Wt 89.8 kg (198 lb)   BMI 32.95 kg/m²      Physical Exam:  Physical Exam  Constitutional:       Appearance: He is not ill-appearing.   Eyes:      Pupils: Pupils are equal, round, and reactive to light.   Cardiovascular:      Rate and Rhythm: Normal rate.      Pulses: Normal pulses.   Pulmonary:      Effort: Pulmonary effort is normal.      Breath sounds: Normal breath sounds.   Neurological:      General: No focal deficit present.      Mental Status: He is alert. Mental status is at baseline.   Psychiatric:         Mood and Affect: Mood normal.         Behavior: Behavior normal.         Thought Content: Thought content normal.         Judgment: Judgment normal.               PHQ-9 Score  PHQ-9 Total Score:       Lab Review  Lab Requisition on 10/16/2023   Component Date Value Ref Range Status    Case Report 10/16/2023    Final                    Value:Surgical Pathology Report                         Case: [id number]                                  Authorizing Provider:  Glenny Schultz MD           Collected:           10/16/2023 09:00 AM       "    Ordering Location:     [facility]   Received:            10/16/2023 10:19 AM                                 LABORATORY                                                                   Pathologist:           Mg Llamas MD                                                       Specimen:    Large Intestine, Right / Ascending Colon, polyp                                            Clinical Information 10/16/2023    Final                    Value:This result contains rich text formatting which cannot be displayed here.    Final Diagnosis 10/16/2023    Final                    Value:This result contains rich text formatting which cannot be displayed here.    Gross Description 10/16/2023    Final                    Value:This result contains rich text formatting which cannot be displayed here.    Microscopic Description 10/16/2023    Final                    Value:This result contains rich text formatting which cannot be displayed here.   Office Visit on 10/06/2023   Component Date Value Ref Range Status    Report Summary 10/06/2023 FINAL   Final    Comment: ====================================================================  [facility] COMP DRUG ANALYSIS,[facility]  ====================================================================  Test                             Result       Flag       Units  Drug Present    Buprenorphine                  322                     ng/mg creat    Norbuprenorphine               >441                    ng/mg creat     Source of buprenorphine is a scheduled prescription medication.     Norbuprenorphine is an expected metabolite of buprenorphine.  ====================================================================  Test                      Result    Flag   Units      Ref Range    Creatinine              227              mg/dL      >=20  ====================================================================  Declared Medications:   Medication list was not  provided.  ====================================================================  For clinical consultation, please call [phone].  ==========================                           ==========================================     Lab on 08/24/2023   Component Date Value Ref Range Status    Glucose 08/24/2023 102 (H)  65 - 99 mg/dL Final    BUN 08/24/2023 10  6 - 20 mg/dL Final    Creatinine 08/24/2023 0.96  0.76 - 1.27 mg/dL Final    Sodium 08/24/2023 140  136 - 145 mmol/L Final    Potassium 08/24/2023 4.1  3.5 - 5.2 mmol/L Final    Chloride 08/24/2023 104  98 - 107 mmol/L Final    CO2 08/24/2023 24.0  22.0 - 29.0 mmol/L Final    Calcium 08/24/2023 9.3  8.6 - 10.5 mg/dL Final    Total Protein 08/24/2023 6.6  6.0 - 8.5 g/dL Final    Albumin 08/24/2023 4.3  3.5 - 5.2 g/dL Final    ALT (SGPT) 08/24/2023 32  1 - 41 U/L Final    AST (SGOT) 08/24/2023 24  1 - 40 U/L Final    Alkaline Phosphatase 08/24/2023 88  39 - 117 U/L Final    Total Bilirubin 08/24/2023 0.3  0.0 - 1.2 mg/dL Final    Globulin 08/24/2023 2.3  gm/dL Final    A/G Ratio 08/24/2023 1.9  g/dL Final    BUN/Creatinine Ratio 08/24/2023 10.4  7.0 - 25.0 Final    Anion Gap 08/24/2023 12.0  5.0 - 15.0 mmol/L Final    eGFR 08/24/2023 100.6  >60.0 mL/min/1.73 Final    Hemoglobin A1C 08/24/2023 5.60  4.80 - 5.60 % Final    TSH 08/24/2023 1.880  0.270 - 4.200 uIU/mL Final    25 Hydroxy, Vitamin D 08/24/2023 15.1 (L)  30.0 - 100.0 ng/ml Final    Vitamin B-12 08/24/2023 677  211 - 946 pg/mL Final    Total Cholesterol 08/24/2023 225 (H)  0 - 200 mg/dL Final    Triglycerides 08/24/2023 205 (H)  0 - 150 mg/dL Final    HDL Cholesterol 08/24/2023 43  40 - 60 mg/dL Final    LDL Cholesterol  08/24/2023 145 (H)  0 - 100 mg/dL Final    VLDL Cholesterol 08/24/2023 37  5 - 40 mg/dL Final    LDL/HDL Ratio 08/24/2023 3.28   Final    Testosterone, Total 08/24/2023 222 (L)  264 - 916 ng/dL Final    Adult male reference interval is based on a population of  healthy nonobese  males (BMI <30) between 19 and 39 years old.  rangel Rob.[email or web address]. JCEM 2017,102;4771-7058. PMID: [id number].    Testosterone, Free 08/24/2023 4.2 (L)  6.8 - 21.5 pg/mL Final    Hepatitis C Ab 08/24/2023 Non-Reactive  Non-Reactive Final    WBC 08/24/2023 5.87  3.40 - 10.80 10*3/mm3 Final    RBC 08/24/2023 4.67  4.14 - 5.80 10*6/mm3 Final    Hemoglobin 08/24/2023 14.1  13.0 - 17.7 g/dL Final    Hematocrit 08/24/2023 39.9  37.5 - 51.0 % Final    MCV 08/24/2023 85.4  79.0 - 97.0 fL Final    MCH 08/24/2023 30.2  26.6 - 33.0 pg Final    MCHC 08/24/2023 35.3  31.5 - 35.7 g/dL Final    RDW 08/24/2023 12.9  12.3 - 15.4 % Final    RDW-SD 08/24/2023 38.9  37.0 - 54.0 fl Final    MPV 08/24/2023 10.1  6.0 - 12.0 fL Final    Platelets 08/24/2023 223  140 - 450 10*3/mm3 Final    Neutrophil % 08/24/2023 54.7  42.7 - 76.0 % Final    Lymphocyte % 08/24/2023 33.6  19.6 - 45.3 % Final    Monocyte % 08/24/2023 9.0  5.0 - 12.0 % Final    Eosinophil % 08/24/2023 1.5  0.3 - 6.2 % Final    Basophil % 08/24/2023 0.9  0.0 - 1.5 % Final    Immature Grans % 08/24/2023 0.3  0.0 - 0.5 % Final    Neutrophils, Absolute 08/24/2023 3.21  1.70 - 7.00 10*3/mm3 Final    Lymphocytes, Absolute 08/24/2023 1.97  0.70 - 3.10 10*3/mm3 Final    Monocytes, Absolute 08/24/2023 0.53  0.10 - 0.90 10*3/mm3 Final    Eosinophils, Absolute 08/24/2023 0.09  0.00 - 0.40 10*3/mm3 Final    Basophils, Absolute 08/24/2023 0.05  0.00 - 0.20 10*3/mm3 Final    Immature Grans, Absolute 08/24/2023 0.02  0.00 - 0.05 10*3/mm3 Final    nRBC 08/24/2023 0.0  0.0 - 0.2 /100 WBC Final        Radiology Results        Assessment / Plan         Assessment and Plan   Diagnoses and all orders for this visit:    1. Attention deficit hyperactivity disorder (ADHD), predominantly inattentive type (Primary)  Assessment & Plan:  Psychological condition is unchanged.  Medication changes per orders.  Psychological condition  will be reassessed at the next regular appointment.  Increase Adderall in the  morning to 15 mg daily then also prescribing a 5 mg Adderall to be taken in the evenings if needed.      The patient has read and signed the [facility] Controlled Substance Contract.  I will continue to see patient for regular follow up appointments.  They are well controlled on their medication.  TOM is updated every 3 months. The patient is aware of the potential for addiction and dependence.      Orders:  -     amphetamine-dextroamphetamine (Adderall) 15 MG tablet; Take 1 tablet by mouth Daily.  Dispense: 30 tablet; Refill: 0  -     amphetamine-dextroamphetamine (Adderall) 5 MG tablet; Take 1 pill orally every afternoon  Dispense: 30 tablet; Refill: 0    2. Low testosterone in male  -     Testosterone Enanthate 200 MG/ML solution; Inject 200 mg into the appropriate muscle as directed by prescriber Every 14 (Fourteen) Days.  Dispense: 2 mL; Refill: 0        Discussed possible differential diagnoses, testing, treatment, recommended non-pharmacological interventions, risks, warning signs to monitor for that would indicate need for follow-up in clinic or ER. If no improvement with these regimens or you have new or worsening symptoms follow-up. Patient verbalizes understanding and agreement with plan of care. Denies further needs or concerns.     Patient was given instructions and counseling regarding her condition and for health maintenance advised.      Health Maintenance  Health Maintenance:   There are no preventive care reminders to display for this patient.       Meds ordered during this visit  New Medications Ordered This Visit   Medications    amphetamine-dextroamphetamine (Adderall) 15 MG tablet     Sig: Take 1 tablet by mouth Daily.     Dispense:  30 tablet     Refill:  0    amphetamine-dextroamphetamine (Adderall) 5 MG tablet     Sig: Take 1 pill orally every afternoon     Dispense:  30 tablet     Refill:  0    Testosterone Enanthate 200 MG/ML solution     Sig: Inject 200 mg into the appropriate  muscle as directed by prescriber Every 14 (Fourteen) Days.     Dispense:  2 mL     Refill:  0       Meds stopped during this visit:  Medications Discontinued During This Encounter   Medication Reason    amphetamine-dextroamphetamine (Adderall) 5 MG tablet     Testosterone Enanthate 200 MG/ML solution Reorder          Visit Diagnoses    ICD-10-CM ICD-9-CM   1. Attention deficit hyperactivity disorder (ADHD), predominantly inattentive type  F90.0 314.00   2. Low testosterone in male  R79.89 790.99         Patient was given instructions and counseling regarding his condition or for health maintenance advice. Please see specific information pulled into the AVS if appropriate.     Follow Up   Return in about 4 weeks (around 12/14/2023) for Recheck.      This document has been electronically signed by Cristiana Jackson DO   November 16, 2023 09:45 EST    Dictated Utilizing Dragon Dictation: Part of this note may be an electronic transcription/translation of spoken language to printed text using the Dragon Dictation System.    Cristiana Jackson D.O.  [facility] [place]

## 2023-11-16 NOTE — ASSESSMENT & PLAN NOTE
Psychological condition is unchanged.  Medication changes per orders.  Psychological condition  will be reassessed at the next regular appointment.  Increase Adderall in the morning to 15 mg daily then also prescribing a 5 mg Adderall to be taken in the evenings if needed.      The patient has read and signed the [facility] Controlled Substance Contract.  I will continue to see patient for regular follow up appointments.  They are well controlled on their medication.  TOM is updated every 3 months. The patient is aware of the potential for addiction and dependence.

## 2023-12-11 ENCOUNTER — OFFICE VISIT (OUTPATIENT)
Dept: FAMILY MEDICINE CLINIC | Facility: CLINIC | Age: 43
End: 2023-12-11
Payer: COMMERCIAL

## 2023-12-11 VITALS
HEIGHT: 65 IN | SYSTOLIC BLOOD PRESSURE: 112 MMHG | WEIGHT: 197.6 LBS | BODY MASS INDEX: 32.92 KG/M2 | HEART RATE: 91 BPM | OXYGEN SATURATION: 97 % | DIASTOLIC BLOOD PRESSURE: 64 MMHG | TEMPERATURE: 98.2 F

## 2023-12-11 DIAGNOSIS — F90.0 ATTENTION DEFICIT HYPERACTIVITY DISORDER (ADHD), PREDOMINANTLY INATTENTIVE TYPE: Primary | ICD-10-CM

## 2023-12-11 DIAGNOSIS — N52.9 ERECTILE DYSFUNCTION, UNSPECIFIED ERECTILE DYSFUNCTION TYPE: ICD-10-CM

## 2023-12-11 DIAGNOSIS — R79.89 LOW TESTOSTERONE IN MALE: ICD-10-CM

## 2023-12-11 PROCEDURE — 1160F RVW MEDS BY RX/DR IN RCRD: CPT | Performed by: FAMILY MEDICINE

## 2023-12-11 PROCEDURE — 99214 OFFICE O/P EST MOD 30 MIN: CPT | Performed by: FAMILY MEDICINE

## 2023-12-11 PROCEDURE — 1159F MED LIST DOCD IN RCRD: CPT | Performed by: FAMILY MEDICINE

## 2023-12-11 RX ORDER — DEXTROAMPHETAMINE SACCHARATE, AMPHETAMINE ASPARTATE, DEXTROAMPHETAMINE SULFATE AND AMPHETAMINE SULFATE 3.75; 3.75; 3.75; 3.75 MG/1; MG/1; MG/1; MG/1
15 TABLET ORAL DAILY
Qty: 30 TABLET | Refills: 0 | Status: SHIPPED | OUTPATIENT
Start: 2023-12-11

## 2023-12-11 RX ORDER — DEXTROAMPHETAMINE SACCHARATE, AMPHETAMINE ASPARTATE, DEXTROAMPHETAMINE SULFATE AND AMPHETAMINE SULFATE 1.25; 1.25; 1.25; 1.25 MG/1; MG/1; MG/1; MG/1
TABLET ORAL
Qty: 30 TABLET | Refills: 0 | Status: SHIPPED | OUTPATIENT
Start: 2023-12-11

## 2023-12-11 RX ORDER — SILDENAFIL 100 MG/1
100 TABLET, FILM COATED ORAL DAILY PRN
Qty: 30 TABLET | Refills: 2 | Status: SHIPPED | OUTPATIENT
Start: 2023-12-11

## 2023-12-11 RX ORDER — TESTOSTERONE CYPIONATE 200 MG/ML
200 INJECTION, SOLUTION INTRAMUSCULAR
Qty: 2 ML | Refills: 0 | Status: SHIPPED | OUTPATIENT
Start: 2023-12-11

## 2023-12-11 RX ORDER — TESTOSTERONE CYPIONATE 200 MG/ML
INJECTION, SOLUTION INTRAMUSCULAR
COMMUNITY
Start: 2023-11-16 | End: 2023-12-11 | Stop reason: SDUPTHER

## 2023-12-11 NOTE — PROGRESS NOTES
Subjective     Chief Complaint  Follow-up (Per patient he is here to follow up on ADHD) and Med Refill (Patient needs refill on adderall 15mg and 5mg as well as sildenafil)    Subjective          Behzad Chi is a 43 y.o. male who presents today to [facility] for initial evaluation .    HPI:   Mr. Chi is a pleasant 43 year old male who presents today to follow up on ADHD.  He also needs a refill on testosterone.    His current medications for ADHD include Adderall 15 mg in the morning and 5 mg in the afternoon.  He reports significant benefit with this dosage.  He is not having any side effects.    He also needs a refill on testosterone and Viagra for which she is taking for low testosterone and erectile dysfunction.  He is not due for labs until next month.    For these controlled controlled to his regimen and signed the controlled substance agreement and has completed a urine drug screen.      The following portions of the patient's history were reviewed and updated as appropriate: allergies, current medications, past family history, past medical history, past social history, past surgical history and problem list.    Objective     Objective     Allergy:   No Known Allergies     Current Medications:   Current Outpatient Medications   Medication Sig Dispense Refill    amphetamine-dextroamphetamine (Adderall) 15 MG tablet Take 1 tablet by mouth Daily. 30 tablet 0    amphetamine-dextroamphetamine (Adderall) 5 MG tablet Take 1 pill orally every afternoon 30 tablet 0    buprenorphine-naloxone (SUBOXONE) 8-2 MG per SL tablet Place 1 tablet under the tongue Daily.      naloxone (NARCAN) 4 MG/0.1ML nasal spray       sildenafil (Viagra) 100 MG tablet Take 1 tablet by mouth Daily As Needed for Erectile Dysfunction. 30 tablet 2    Testosterone Cypionate (DEPOTESTOTERONE CYPIONATE) 200 MG/ML injection Inject 1 mL into the appropriate muscle as directed by prescriber Every 14  "(Fourteen) Days. 2 mL 0     No current facility-administered medications for this visit.       Past Medical History:  Past Medical History:   Diagnosis Date    ADHD     Depression     PTSD (post-traumatic stress disorder)        Past Surgical History:  Past Surgical History:   Procedure Laterality Date    APPENDECTOMY      CIRCUMCISION         Social History:  Social History     Socioeconomic History    Marital status: Legally    Tobacco Use    Smoking status: Every Day     Packs/day: 0.50     Years: 21.00     Additional pack years: 0.00     Total pack years: 10.50     Types: Cigarettes     Passive exposure: Current    Smokeless tobacco: Never   Vaping Use    Vaping Use: Never used   Substance and Sexual Activity    Alcohol use: Never    Drug use: Defer    Sexual activity: Defer       Family History:  Family History   Problem Relation Age of Onset    Diabetes Mother     Cancer Father     Other Father          Vital Signs:   /64   Pulse 91   Temp 98.2 °F (36.8 °C) (Infrared)   Ht 165.1 cm (65\")   Wt 89.6 kg (197 lb 9.6 oz)   SpO2 97%   BMI 32.88 kg/m²      Physical Exam:  Physical Exam  Constitutional:       Appearance: He is not ill-appearing.   Eyes:      Pupils: Pupils are equal, round, and reactive to light.   Cardiovascular:      Rate and Rhythm: Normal rate.      Pulses: Normal pulses.   Pulmonary:      Effort: Pulmonary effort is normal.      Breath sounds: Normal breath sounds.   Neurological:      General: No focal deficit present.      Mental Status: He is alert. Mental status is at baseline.   Psychiatric:         Mood and Affect: Mood normal.         Behavior: Behavior normal.         Thought Content: Thought content normal.         Judgment: Judgment normal.               PHQ-9 Score  PHQ-9 Total Score:       Lab Review  Lab Requisition on 10/16/2023   Component Date Value Ref Range Status    Case Report 10/16/2023    Final                    Value:Surgical Pathology Report           "               Case: [id number]                                  Authorizing Provider:  Glenny Schultz MD           Collected:           10/16/2023 09:00 AM          Ordering Location:     [facility]   Received:            10/16/2023 10:19 AM                                 LABORATORY                                                                   Pathologist:           Mg Llamas MD                                                       Specimen:    Large Intestine, Right / Ascending Colon, polyp                                            Clinical Information 10/16/2023    Final                    Value:This result contains rich text formatting which cannot be displayed here.    Final Diagnosis 10/16/2023    Final                    Value:This result contains rich text formatting which cannot be displayed here.    Gross Description 10/16/2023    Final                    Value:This result contains rich text formatting which cannot be displayed here.    Microscopic Description 10/16/2023    Final                    Value:This result contains rich text formatting which cannot be displayed here.   Office Visit on 10/06/2023   Component Date Value Ref Range Status    Report Summary 10/06/2023 FINAL   Final    Comment: ====================================================================  [facility] COMP DRUG ANALYSIS,[facility]  ====================================================================  Test                             Result       Flag       Units  Drug Present    Buprenorphine                  322                     ng/mg creat    Norbuprenorphine               >441                    ng/mg creat     Source of buprenorphine is a scheduled prescription medication.     Norbuprenorphine is an expected metabolite of buprenorphine.  ====================================================================  Test                      Result    Flag   Units      Ref Range    Creatinine              227               mg/dL      >=20  ====================================================================  Declared Medications:   Medication list was not provided.  ====================================================================  For clinical consultation, please call [phone].  ==========================                           ==========================================          Radiology Results        Assessment / Plan         Assessment and Plan   Diagnoses and all orders for this visit:    1. Attention deficit hyperactivity disorder (ADHD), predominantly inattentive type (Primary)  Assessment & Plan:  Psychological condition is unchanged.  Medication changes per orders.  Psychological condition  will be reassessed at the next regular appointment.  Continue Adderall in the morning to 15 mg daily then also prescribing a 5 mg Adderall to be taken in the evenings if needed.      The patient has read and signed the [facility] Controlled Substance Contract.  I will continue to see patient for regular follow up appointments.  They are well controlled on their medication.  TOM is updated every 3 months. The patient is aware of the potential for addiction and dependence.      Orders:  -     amphetamine-dextroamphetamine (Adderall) 15 MG tablet; Take 1 tablet by mouth Daily.  Dispense: 30 tablet; Refill: 0  -     amphetamine-dextroamphetamine (Adderall) 5 MG tablet; Take 1 pill orally every afternoon  Dispense: 30 tablet; Refill: 0    2. Low testosterone in male  -     Testosterone Cypionate (DEPOTESTOTERONE CYPIONATE) 200 MG/ML injection; Inject 1 mL into the appropriate muscle as directed by prescriber Every 14 (Fourteen) Days.  Dispense: 2 mL; Refill: 0    3. Erectile dysfunction, unspecified erectile dysfunction type  -     sildenafil (Viagra) 100 MG tablet; Take 1 tablet by mouth Daily As Needed for Erectile Dysfunction.  Dispense: 30 tablet; Refill: 2          Discussed possible differential diagnoses,  testing, treatment, recommended non-pharmacological interventions, risks, warning signs to monitor for that would indicate need for follow-up in clinic or ER. If no improvement with these regimens or you have new or worsening symptoms follow-up. Patient verbalizes understanding and agreement with plan of care. Denies further needs or concerns.     Patient was given instructions and counseling regarding her condition and for health maintenance advised.      Health Maintenance  Health Maintenance:   There are no preventive care reminders to display for this patient.       Meds ordered during this visit  New Medications Ordered This Visit   Medications    amphetamine-dextroamphetamine (Adderall) 15 MG tablet     Sig: Take 1 tablet by mouth Daily.     Dispense:  30 tablet     Refill:  0    amphetamine-dextroamphetamine (Adderall) 5 MG tablet     Sig: Take 1 pill orally every afternoon     Dispense:  30 tablet     Refill:  0    sildenafil (Viagra) 100 MG tablet     Sig: Take 1 tablet by mouth Daily As Needed for Erectile Dysfunction.     Dispense:  30 tablet     Refill:  2    Testosterone Cypionate (DEPOTESTOTERONE CYPIONATE) 200 MG/ML injection     Sig: Inject 1 mL into the appropriate muscle as directed by prescriber Every 14 (Fourteen) Days.     Dispense:  2 mL     Refill:  0       Meds stopped during this visit:  Medications Discontinued During This Encounter   Medication Reason    Testosterone Enanthate 200 MG/ML solution Alternate therapy    sodium-potassium-magnesium sulfates (Suprep Bowel Prep Kit) 17.5-3.13-1.6 GM/177ML solution oral solution *Therapy completed    sildenafil (Viagra) 100 MG tablet Reorder    amphetamine-dextroamphetamine (Adderall) 15 MG tablet Reorder    amphetamine-dextroamphetamine (Adderall) 5 MG tablet Reorder    Testosterone Cypionate (DEPOTESTOTERONE CYPIONATE) 200 MG/ML injection Reorder          Visit Diagnoses    ICD-10-CM ICD-9-CM   1. Attention deficit hyperactivity disorder (ADHD),  predominantly inattentive type  F90.0 314.00   2. Low testosterone in male  R79.89 790.99   3. Erectile dysfunction, unspecified erectile dysfunction type  N52.9 607.84           Patient was given instructions and counseling regarding his condition or for health maintenance advice. Please see specific information pulled into the AVS if appropriate.     Follow Up   Return in about 4 weeks (around 1/8/2024) for Recheck.      This document has been electronically signed by Cristiana Jackson DO   December 11, 2023 09:10 EST    Dictated Utilizing Dragon Dictation: Part of this note may be an electronic transcription/translation of spoken language to printed text using the Dragon Dictation System.    Cristiana Jackson D.O.  [facility] [place]

## 2023-12-11 NOTE — ASSESSMENT & PLAN NOTE
Psychological condition is unchanged.  Medication changes per orders.  Psychological condition  will be reassessed at the next regular appointment.  Continue Adderall in the morning to 15 mg daily then also prescribing a 5 mg Adderall to be taken in the evenings if needed.      The patient has read and signed the [facility] Controlled Substance Contract.  I will continue to see patient for regular follow up appointments.  They are well controlled on their medication.  TOM is updated every 3 months. The patient is aware of the potential for addiction and dependence.

## 2024-01-11 ENCOUNTER — LAB (OUTPATIENT)
Dept: LAB | Facility: HOSPITAL | Age: 44
End: 2024-01-11
Payer: COMMERCIAL

## 2024-01-11 ENCOUNTER — OFFICE VISIT (OUTPATIENT)
Dept: FAMILY MEDICINE CLINIC | Facility: CLINIC | Age: 44
End: 2024-01-11
Payer: COMMERCIAL

## 2024-01-11 VITALS
HEIGHT: 65 IN | TEMPERATURE: 97.8 F | SYSTOLIC BLOOD PRESSURE: 104 MMHG | BODY MASS INDEX: 31.75 KG/M2 | RESPIRATION RATE: 21 BRPM | DIASTOLIC BLOOD PRESSURE: 60 MMHG | HEART RATE: 103 BPM | WEIGHT: 190.6 LBS

## 2024-01-11 DIAGNOSIS — R79.89 LOW TESTOSTERONE IN MALE: ICD-10-CM

## 2024-01-11 DIAGNOSIS — N52.9 ERECTILE DYSFUNCTION, UNSPECIFIED ERECTILE DYSFUNCTION TYPE: ICD-10-CM

## 2024-01-11 DIAGNOSIS — F90.0 ATTENTION DEFICIT HYPERACTIVITY DISORDER (ADHD), PREDOMINANTLY INATTENTIVE TYPE: Primary | ICD-10-CM

## 2024-01-11 DIAGNOSIS — E55.9 VITAMIN D DEFICIENCY: ICD-10-CM

## 2024-01-11 DIAGNOSIS — R53.83 OTHER FATIGUE: ICD-10-CM

## 2024-01-11 DIAGNOSIS — F90.0 ATTENTION DEFICIT HYPERACTIVITY DISORDER (ADHD), PREDOMINANTLY INATTENTIVE TYPE: ICD-10-CM

## 2024-01-11 LAB
25(OH)D3 SERPL-MCNC: 11.5 NG/ML (ref 30–100)
ALBUMIN SERPL-MCNC: 4.4 G/DL (ref 3.5–5.2)
ALBUMIN/GLOB SERPL: 1.6 G/DL
ALP SERPL-CCNC: 93 U/L (ref 39–117)
ALT SERPL W P-5'-P-CCNC: 22 U/L (ref 1–41)
ANION GAP SERPL CALCULATED.3IONS-SCNC: 10 MMOL/L (ref 5–15)
AST SERPL-CCNC: 22 U/L (ref 1–40)
BASOPHILS # BLD AUTO: 0.04 10*3/MM3 (ref 0–0.2)
BASOPHILS NFR BLD AUTO: 0.6 % (ref 0–1.5)
BILIRUB SERPL-MCNC: 0.7 MG/DL (ref 0–1.2)
BUN SERPL-MCNC: 11 MG/DL (ref 6–20)
BUN/CREAT SERPL: 9.4 (ref 7–25)
CALCIUM SPEC-SCNC: 9.1 MG/DL (ref 8.6–10.5)
CHLORIDE SERPL-SCNC: 104 MMOL/L (ref 98–107)
CO2 SERPL-SCNC: 25 MMOL/L (ref 22–29)
CREAT SERPL-MCNC: 1.17 MG/DL (ref 0.76–1.27)
DEPRECATED RDW RBC AUTO: 38.9 FL (ref 37–54)
EGFRCR SERPLBLD CKD-EPI 2021: 79.3 ML/MIN/1.73
EOSINOPHIL # BLD AUTO: 0.12 10*3/MM3 (ref 0–0.4)
EOSINOPHIL NFR BLD AUTO: 1.8 % (ref 0.3–6.2)
ERYTHROCYTE [DISTWIDTH] IN BLOOD BY AUTOMATED COUNT: 13 % (ref 12.3–15.4)
GLOBULIN UR ELPH-MCNC: 2.8 GM/DL
GLUCOSE SERPL-MCNC: 106 MG/DL (ref 65–99)
HCT VFR BLD AUTO: 49.8 % (ref 37.5–51)
HGB BLD-MCNC: 16.9 G/DL (ref 13–17.7)
IMM GRANULOCYTES # BLD AUTO: 0.01 10*3/MM3 (ref 0–0.05)
IMM GRANULOCYTES NFR BLD AUTO: 0.2 % (ref 0–0.5)
LYMPHOCYTES # BLD AUTO: 1.94 10*3/MM3 (ref 0.7–3.1)
LYMPHOCYTES NFR BLD AUTO: 29.8 % (ref 19.6–45.3)
MCH RBC QN AUTO: 28.1 PG (ref 26.6–33)
MCHC RBC AUTO-ENTMCNC: 33.9 G/DL (ref 31.5–35.7)
MCV RBC AUTO: 82.7 FL (ref 79–97)
MONOCYTES # BLD AUTO: 0.63 10*3/MM3 (ref 0.1–0.9)
MONOCYTES NFR BLD AUTO: 9.7 % (ref 5–12)
NEUTROPHILS NFR BLD AUTO: 3.76 10*3/MM3 (ref 1.7–7)
NEUTROPHILS NFR BLD AUTO: 57.9 % (ref 42.7–76)
NRBC BLD AUTO-RTO: 0 /100 WBC (ref 0–0.2)
PLATELET # BLD AUTO: 252 10*3/MM3 (ref 140–450)
PMV BLD AUTO: 10.7 FL (ref 6–12)
POTASSIUM SERPL-SCNC: 4.5 MMOL/L (ref 3.5–5.2)
PROT SERPL-MCNC: 7.2 G/DL (ref 6–8.5)
RBC # BLD AUTO: 6.02 10*6/MM3 (ref 4.14–5.8)
SODIUM SERPL-SCNC: 139 MMOL/L (ref 136–145)
TESTOST SERPL-MCNC: >1500 NG/DL (ref 249–836)
TSH SERPL DL<=0.05 MIU/L-ACNC: 0.32 UIU/ML (ref 0.27–4.2)
VIT B12 BLD-MCNC: 611 PG/ML (ref 211–946)
WBC NRBC COR # BLD AUTO: 6.5 10*3/MM3 (ref 3.4–10.8)

## 2024-01-11 PROCEDURE — 84403 ASSAY OF TOTAL TESTOSTERONE: CPT

## 2024-01-11 PROCEDURE — 84402 ASSAY OF FREE TESTOSTERONE: CPT

## 2024-01-11 PROCEDURE — 80050 GENERAL HEALTH PANEL: CPT

## 2024-01-11 PROCEDURE — 99214 OFFICE O/P EST MOD 30 MIN: CPT | Performed by: FAMILY MEDICINE

## 2024-01-11 PROCEDURE — 82306 VITAMIN D 25 HYDROXY: CPT

## 2024-01-11 PROCEDURE — 36415 COLL VENOUS BLD VENIPUNCTURE: CPT

## 2024-01-11 PROCEDURE — 1159F MED LIST DOCD IN RCRD: CPT | Performed by: FAMILY MEDICINE

## 2024-01-11 PROCEDURE — 82607 VITAMIN B-12: CPT

## 2024-01-11 PROCEDURE — 1160F RVW MEDS BY RX/DR IN RCRD: CPT | Performed by: FAMILY MEDICINE

## 2024-01-11 RX ORDER — TESTOSTERONE CYPIONATE 200 MG/ML
200 INJECTION, SOLUTION INTRAMUSCULAR
Qty: 2 ML | Refills: 0 | Status: SHIPPED | OUTPATIENT
Start: 2024-01-11

## 2024-01-11 RX ORDER — DEXTROAMPHETAMINE SACCHARATE, AMPHETAMINE ASPARTATE MONOHYDRATE, DEXTROAMPHETAMINE SULFATE AND AMPHETAMINE SULFATE 5; 5; 5; 5 MG/1; MG/1; MG/1; MG/1
20 CAPSULE, EXTENDED RELEASE ORAL EVERY MORNING
Qty: 30 CAPSULE | Refills: 0 | Status: SHIPPED | OUTPATIENT
Start: 2024-01-11

## 2024-01-11 RX ORDER — SILDENAFIL 100 MG/1
100 TABLET, FILM COATED ORAL DAILY PRN
Qty: 30 TABLET | Refills: 2 | Status: SHIPPED | OUTPATIENT
Start: 2024-01-11

## 2024-01-11 RX ORDER — DEXTROAMPHETAMINE SACCHARATE, AMPHETAMINE ASPARTATE, DEXTROAMPHETAMINE SULFATE AND AMPHETAMINE SULFATE 2.5; 2.5; 2.5; 2.5 MG/1; MG/1; MG/1; MG/1
TABLET ORAL
Qty: 30 TABLET | Refills: 0 | Status: SHIPPED | OUTPATIENT
Start: 2024-01-11

## 2024-01-11 NOTE — PROGRESS NOTES
Subjective     Chief Complaint  Fatigue and ADHD    Subjective          Behzad Chi is a 43 y.o. male who presents today to [facility] for initial evaluation .    HPI:   Mr. Chi is a pleasant 43 year old male who presents today to follow up on ADHD.  He also needs a refill on testosterone.    His current medications for ADHD include Adderall 15 mg in the morning and 5 mg in the afternoon.  He reports increase in symptoms lately..  He is not having any side effects.    He also needs a refill on testosterone and Viagra for which she is taking for low testosterone and erectile dysfunction.  He is due for labs.  He has also been quite fatigued. This has been most prominent over the last 4 days or so     For these controlled controlled to his regimen and signed the controlled substance agreement and has completed a urine drug screen.      The following portions of the patient's history were reviewed and updated as appropriate: allergies, current medications, past family history, past medical history, past social history, past surgical history and problem list.    Objective     Objective     Allergy:   No Known Allergies     Current Medications:   Current Outpatient Medications   Medication Sig Dispense Refill    buprenorphine-naloxone (SUBOXONE) 8-2 MG per SL tablet Place 1 tablet under the tongue Daily.      naloxone (NARCAN) 4 MG/0.1ML nasal spray       sildenafil (Viagra) 100 MG tablet Take 1 tablet by mouth Daily As Needed for Erectile Dysfunction. 30 tablet 2    Testosterone Cypionate (DEPOTESTOTERONE CYPIONATE) 200 MG/ML injection Inject 1 mL into the appropriate muscle as directed by prescriber Every 14 (Fourteen) Days. 2 mL 0    amphetamine-dextroamphetamine (Adderall) 10 MG tablet Take 1 pill orally every afternoon 30 tablet 0    amphetamine-dextroamphetamine XR (Adderall XR) 20 MG 24 hr capsule Take 1 capsule by mouth Every Morning 30 capsule 0     No current  "facility-administered medications for this visit.       Past Medical History:  Past Medical History:   Diagnosis Date    ADHD     Depression     PTSD (post-traumatic stress disorder)        Past Surgical History:  Past Surgical History:   Procedure Laterality Date    APPENDECTOMY      CIRCUMCISION         Social History:  Social History     Socioeconomic History    Marital status: Legally    Tobacco Use    Smoking status: Every Day     Packs/day: 0.50     Years: 21.00     Additional pack years: 0.00     Total pack years: 10.50     Types: Cigarettes     Passive exposure: Current    Smokeless tobacco: Never   Vaping Use    Vaping Use: Never used   Substance and Sexual Activity    Alcohol use: Never    Drug use: Defer    Sexual activity: Defer       Family History:  Family History   Problem Relation Age of Onset    Diabetes Mother     Cancer Father     Other Father          Vital Signs:   /60 (BP Location: Left arm, Patient Position: Sitting, Cuff Size: Adult)   Pulse 103   Temp 97.8 °F (36.6 °C) (Infrared)   Resp 21   Ht 165.1 cm (65\")   Wt 86.5 kg (190 lb 9.6 oz)   BMI 31.72 kg/m²      Physical Exam:  Physical Exam  Constitutional:       Appearance: He is not ill-appearing.   Eyes:      Pupils: Pupils are equal, round, and reactive to light.   Cardiovascular:      Rate and Rhythm: Normal rate.      Pulses: Normal pulses.   Pulmonary:      Effort: Pulmonary effort is normal.      Breath sounds: Normal breath sounds.   Neurological:      General: No focal deficit present.      Mental Status: He is alert. Mental status is at baseline.   Psychiatric:         Mood and Affect: Mood normal.         Behavior: Behavior normal.         Thought Content: Thought content normal.         Judgment: Judgment normal.               PHQ-9 Score  PHQ-9 Total Score:       Lab Review  Lab Requisition on 10/16/2023   Component Date Value Ref Range Status    Case Report 10/16/2023    Final                    " Value:Surgical Pathology Report                         Case: [id number]                                  Authorizing Provider:  Glenny Schultz MD           Collected:           10/16/2023 09:00 AM          Ordering Location:     [facility]   Received:            10/16/2023 10:19 AM                                 LABORATORY                                                                   Pathologist:           Mg Llamas MD                                                       Specimen:    Large Intestine, Right / Ascending Colon, polyp                                            Clinical Information 10/16/2023    Final                    Value:This result contains rich text formatting which cannot be displayed here.    Final Diagnosis 10/16/2023    Final                    Value:This result contains rich text formatting which cannot be displayed here.    Gross Description 10/16/2023    Final                    Value:This result contains rich text formatting which cannot be displayed here.    Microscopic Description 10/16/2023    Final                    Value:This result contains rich text formatting which cannot be displayed here.        Radiology Results        Assessment / Plan         Assessment and Plan   Diagnoses and all orders for this visit:    1. Attention deficit hyperactivity disorder (ADHD), predominantly inattentive type (Primary)  Assessment & Plan:  Psychological condition is unchanged.  Medication changes per orders.  Psychological condition  will be reassessed at the next regular appointment.  Changing Adderall prescription to a extended release.  Also, continue 10 mg of Adderall as needed in the afternoons if needed      The patient has read and signed the [facility] Controlled Substance Contract.  I will continue to see patient for regular follow up appointments.  They are well controlled on their medication.  TOM is updated every 3 months. The patient is aware of  the potential for addiction and dependence.      Orders:  -     amphetamine-dextroamphetamine XR (Adderall XR) 20 MG 24 hr capsule; Take 1 capsule by mouth Every Morning  Dispense: 30 capsule; Refill: 0  -     amphetamine-dextroamphetamine (Adderall) 10 MG tablet; Take 1 pill orally every afternoon  Dispense: 30 tablet; Refill: 0  -     Comprehensive Metabolic Panel; Future  -     CBC & Differential; Future    2. Low testosterone in male  -     Testosterone Cypionate (DEPOTESTOTERONE CYPIONATE) 200 MG/ML injection; Inject 1 mL into the appropriate muscle as directed by prescriber Every 14 (Fourteen) Days.  Dispense: 2 mL; Refill: 0  -     Testosterone, Free, Total; Future  -     Testosterone; Future  -     TSH Rfx On Abnormal To Free T4; Future    3. Vitamin D deficiency  -     Vitamin D,25-Hydroxy; Future  -     Vitamin B12; Future    4. Other fatigue  -     Testosterone, Free, Total; Future  -     Testosterone; Future  -     TSH Rfx On Abnormal To Free T4; Future  -     Vitamin D,25-Hydroxy; Future  -     Vitamin B12; Future    5. Erectile dysfunction, unspecified erectile dysfunction type  -     sildenafil (Viagra) 100 MG tablet; Take 1 tablet by mouth Daily As Needed for Erectile Dysfunction.  Dispense: 30 tablet; Refill: 2        Discussed possible differential diagnoses, testing, treatment, recommended non-pharmacological interventions, risks, warning signs to monitor for that would indicate need for follow-up in clinic or ER. If no improvement with these regimens or you have new or worsening symptoms follow-up. Patient verbalizes understanding and agreement with plan of care. Denies further needs or concerns.     Patient was given instructions and counseling regarding her condition and for health maintenance advised.      Health Maintenance  Health Maintenance:   There are no preventive care reminders to display for this patient.       Meds ordered during this visit  New Medications Ordered This Visit    Medications    Testosterone Cypionate (DEPOTESTOTERONE CYPIONATE) 200 MG/ML injection     Sig: Inject 1 mL into the appropriate muscle as directed by prescriber Every 14 (Fourteen) Days.     Dispense:  2 mL     Refill:  0    amphetamine-dextroamphetamine XR (Adderall XR) 20 MG 24 hr capsule     Sig: Take 1 capsule by mouth Every Morning     Dispense:  30 capsule     Refill:  0    amphetamine-dextroamphetamine (Adderall) 10 MG tablet     Sig: Take 1 pill orally every afternoon     Dispense:  30 tablet     Refill:  0    sildenafil (Viagra) 100 MG tablet     Sig: Take 1 tablet by mouth Daily As Needed for Erectile Dysfunction.     Dispense:  30 tablet     Refill:  2       Meds stopped during this visit:  Medications Discontinued During This Encounter   Medication Reason    amphetamine-dextroamphetamine (Adderall) 15 MG tablet     amphetamine-dextroamphetamine (Adderall) 5 MG tablet     Testosterone Cypionate (DEPOTESTOTERONE CYPIONATE) 200 MG/ML injection Reorder    sildenafil (Viagra) 100 MG tablet Reorder            Visit Diagnoses    ICD-10-CM ICD-9-CM   1. Attention deficit hyperactivity disorder (ADHD), predominantly inattentive type  F90.0 314.00   2. Low testosterone in male  R79.89 790.99   3. Vitamin D deficiency  E55.9 268.9   4. Other fatigue  R53.83 780.79   5. Erectile dysfunction, unspecified erectile dysfunction type  N52.9 607.84             Patient was given instructions and counseling regarding his condition or for health maintenance advice. Please see specific information pulled into the AVS if appropriate.     Follow Up   Return in about 1 month (around 2/11/2024) for Recheck.      This document has been electronically signed by Cristiana Jackson DO   January 11, 2024 10:15 EST    Dictated Utilizing Dragon Dictation: Part of this note may be an electronic transcription/translation of spoken language to printed text using the Dragon Dictation System.    Cristiana Jackson D.O.  [facility] [place]

## 2024-01-11 NOTE — ASSESSMENT & PLAN NOTE
Psychological condition is unchanged.  Medication changes per orders.  Psychological condition  will be reassessed at the next regular appointment.  Changing Adderall prescription to a extended release.  Also, continue 10 mg of Adderall as needed in the afternoons if needed      The patient has read and signed the [facility] Controlled Substance Contract.  I will continue to see patient for regular follow up appointments.  They are well controlled on their medication.  TOM is updated every 3 months. The patient is aware of the potential for addiction and dependence.

## 2024-01-22 LAB
TESTOST FREE SERPL-MCNC: 37.6 PG/ML (ref 6.8–21.5)
TESTOST SERPL-MCNC: >1500 NG/DL (ref 264–916)

## 2024-02-13 ENCOUNTER — OFFICE VISIT (OUTPATIENT)
Dept: FAMILY MEDICINE CLINIC | Facility: CLINIC | Age: 44
End: 2024-02-13
Payer: COMMERCIAL

## 2024-02-13 ENCOUNTER — TELEPHONE (OUTPATIENT)
Dept: FAMILY MEDICINE CLINIC | Facility: CLINIC | Age: 44
End: 2024-02-13

## 2024-02-13 VITALS
TEMPERATURE: 98.2 F | WEIGHT: 185.6 LBS | BODY MASS INDEX: 30.92 KG/M2 | HEIGHT: 65 IN | SYSTOLIC BLOOD PRESSURE: 124 MMHG | DIASTOLIC BLOOD PRESSURE: 70 MMHG | HEART RATE: 80 BPM | OXYGEN SATURATION: 97 %

## 2024-02-13 DIAGNOSIS — F90.0 ATTENTION DEFICIT HYPERACTIVITY DISORDER (ADHD), PREDOMINANTLY INATTENTIVE TYPE: Primary | ICD-10-CM

## 2024-02-13 DIAGNOSIS — N52.9 ERECTILE DYSFUNCTION, UNSPECIFIED ERECTILE DYSFUNCTION TYPE: ICD-10-CM

## 2024-02-13 DIAGNOSIS — R79.89 LOW TESTOSTERONE IN MALE: ICD-10-CM

## 2024-02-13 PROCEDURE — 1160F RVW MEDS BY RX/DR IN RCRD: CPT | Performed by: FAMILY MEDICINE

## 2024-02-13 PROCEDURE — 99214 OFFICE O/P EST MOD 30 MIN: CPT | Performed by: FAMILY MEDICINE

## 2024-02-13 PROCEDURE — 1159F MED LIST DOCD IN RCRD: CPT | Performed by: FAMILY MEDICINE

## 2024-02-13 RX ORDER — SILDENAFIL 100 MG/1
100 TABLET, FILM COATED ORAL DAILY PRN
Qty: 30 TABLET | Refills: 2 | Status: SHIPPED | OUTPATIENT
Start: 2024-02-13

## 2024-02-13 RX ORDER — SILDENAFIL 100 MG/1
100 TABLET, FILM COATED ORAL DAILY PRN
Qty: 30 TABLET | Refills: 2 | Status: SHIPPED | OUTPATIENT
Start: 2024-02-13 | End: 2024-02-13 | Stop reason: SDUPTHER

## 2024-02-13 RX ORDER — LISDEXAMFETAMINE DIMESYLATE CAPSULES 30 MG/1
30 CAPSULE ORAL EVERY MORNING
Qty: 30 CAPSULE | Refills: 0 | Status: SHIPPED | OUTPATIENT
Start: 2024-02-13

## 2024-02-13 RX ORDER — BUPRENORPHINE 300 MG/1
SOLUTION SUBCUTANEOUS
COMMUNITY
Start: 2024-01-30

## 2024-02-13 RX ORDER — TESTOSTERONE CYPIONATE 200 MG/ML
200 INJECTION, SOLUTION INTRAMUSCULAR
Qty: 2 ML | Refills: 0 | Status: SHIPPED | OUTPATIENT
Start: 2024-02-13

## 2024-02-13 NOTE — TELEPHONE ENCOUNTER
Caller: Behzad Chi    Relationship: Self    Best call back number:       [phone] (Mobile)     Medication with the concern:      sildenafil (Viagra) 100 MG tablet     What are your concerns:     PATIENT REQUESTED MEDICATION REFILL BE FORWARDED TO PHARMACY INCLUDED BELOW SINCE IT IS CHEAPER THAN THE MED [facility]    [place]    TELEPHONE CONTACT:    [phone]

## 2024-02-20 DIAGNOSIS — F90.0 ATTENTION DEFICIT HYPERACTIVITY DISORDER (ADHD), PREDOMINANTLY INATTENTIVE TYPE: Primary | ICD-10-CM

## 2024-02-20 RX ORDER — LISDEXAMFETAMINE DIMESYLATE CAPSULES 10 MG/1
10 CAPSULE ORAL DAILY
Qty: 30 CAPSULE | Refills: 0 | Status: SHIPPED | OUTPATIENT
Start: 2024-02-20 | End: 2024-02-23

## 2024-02-23 DIAGNOSIS — F90.0 ATTENTION DEFICIT HYPERACTIVITY DISORDER (ADHD), PREDOMINANTLY INATTENTIVE TYPE: Primary | ICD-10-CM

## 2024-02-23 RX ORDER — DEXTROAMPHETAMINE SACCHARATE, AMPHETAMINE ASPARTATE MONOHYDRATE, DEXTROAMPHETAMINE SULFATE AND AMPHETAMINE SULFATE 7.5; 7.5; 7.5; 7.5 MG/1; MG/1; MG/1; MG/1
30 CAPSULE, EXTENDED RELEASE ORAL EVERY MORNING
Qty: 10 CAPSULE | Refills: 0 | Status: SHIPPED | OUTPATIENT
Start: 2024-02-23 | End: 2024-02-26

## 2024-02-23 RX ORDER — DEXTROAMPHETAMINE SACCHARATE, AMPHETAMINE ASPARTATE, DEXTROAMPHETAMINE SULFATE AND AMPHETAMINE SULFATE 2.5; 2.5; 2.5; 2.5 MG/1; MG/1; MG/1; MG/1
TABLET ORAL
Qty: 10 TABLET | Refills: 0 | Status: SHIPPED | OUTPATIENT
Start: 2024-02-23 | End: 2024-02-26

## 2024-02-24 ENCOUNTER — TELEPHONE (OUTPATIENT)
Dept: FAMILY MEDICINE CLINIC | Facility: CLINIC | Age: 44
End: 2024-02-24
Payer: COMMERCIAL

## 2024-02-26 RX ORDER — DEXTROAMPHETAMINE SACCHARATE, AMPHETAMINE ASPARTATE MONOHYDRATE, DEXTROAMPHETAMINE SULFATE AND AMPHETAMINE SULFATE 7.5; 7.5; 7.5; 7.5 MG/1; MG/1; MG/1; MG/1
30 CAPSULE, EXTENDED RELEASE ORAL EVERY MORNING
Qty: 10 CAPSULE | Refills: 0 | Status: SHIPPED | OUTPATIENT
Start: 2024-02-26

## 2024-02-26 RX ORDER — DEXTROAMPHETAMINE SACCHARATE, AMPHETAMINE ASPARTATE, DEXTROAMPHETAMINE SULFATE AND AMPHETAMINE SULFATE 2.5; 2.5; 2.5; 2.5 MG/1; MG/1; MG/1; MG/1
TABLET ORAL
Qty: 10 TABLET | Refills: 0 | Status: SHIPPED | OUTPATIENT
Start: 2024-02-26

## 2024-03-05 ENCOUNTER — OFFICE VISIT (OUTPATIENT)
Dept: FAMILY MEDICINE CLINIC | Facility: CLINIC | Age: 44
End: 2024-03-05
Payer: COMMERCIAL

## 2024-03-05 VITALS
DIASTOLIC BLOOD PRESSURE: 70 MMHG | SYSTOLIC BLOOD PRESSURE: 118 MMHG | HEIGHT: 65 IN | WEIGHT: 188 LBS | BODY MASS INDEX: 31.32 KG/M2 | HEART RATE: 90 BPM | TEMPERATURE: 98.6 F

## 2024-03-05 DIAGNOSIS — E55.9 VITAMIN D DEFICIENCY: ICD-10-CM

## 2024-03-05 DIAGNOSIS — F90.0 ATTENTION DEFICIT HYPERACTIVITY DISORDER (ADHD), PREDOMINANTLY INATTENTIVE TYPE: Primary | ICD-10-CM

## 2024-03-05 DIAGNOSIS — R79.89 LOW TESTOSTERONE IN MALE: ICD-10-CM

## 2024-03-05 DIAGNOSIS — N52.9 ERECTILE DYSFUNCTION, UNSPECIFIED ERECTILE DYSFUNCTION TYPE: ICD-10-CM

## 2024-03-05 PROBLEM — Z11.59 NEED FOR HEPATITIS C SCREENING TEST: Status: RESOLVED | Noted: 2023-08-24 | Resolved: 2024-03-05

## 2024-03-05 PROCEDURE — 1159F MED LIST DOCD IN RCRD: CPT | Performed by: FAMILY MEDICINE

## 2024-03-05 PROCEDURE — 1160F RVW MEDS BY RX/DR IN RCRD: CPT | Performed by: FAMILY MEDICINE

## 2024-03-05 PROCEDURE — 99214 OFFICE O/P EST MOD 30 MIN: CPT | Performed by: FAMILY MEDICINE

## 2024-03-05 RX ORDER — ERGOCALCIFEROL 1.25 MG/1
50000 CAPSULE ORAL WEEKLY
Qty: 12 CAPSULE | Refills: 1 | Status: SHIPPED | OUTPATIENT
Start: 2024-03-05

## 2024-03-05 RX ORDER — DEXTROAMPHETAMINE SACCHARATE, AMPHETAMINE ASPARTATE MONOHYDRATE, DEXTROAMPHETAMINE SULFATE AND AMPHETAMINE SULFATE 7.5; 7.5; 7.5; 7.5 MG/1; MG/1; MG/1; MG/1
30 CAPSULE, EXTENDED RELEASE ORAL EVERY MORNING
Qty: 30 CAPSULE | Refills: 0 | Status: SHIPPED | OUTPATIENT
Start: 2024-03-05

## 2024-03-05 RX ORDER — DEXTROAMPHETAMINE SACCHARATE, AMPHETAMINE ASPARTATE, DEXTROAMPHETAMINE SULFATE AND AMPHETAMINE SULFATE 2.5; 2.5; 2.5; 2.5 MG/1; MG/1; MG/1; MG/1
TABLET ORAL
Qty: 30 TABLET | Refills: 0 | Status: SHIPPED | OUTPATIENT
Start: 2024-03-05

## 2024-03-05 RX ORDER — SILDENAFIL 100 MG/1
100 TABLET, FILM COATED ORAL DAILY PRN
Qty: 30 TABLET | Refills: 2 | Status: SHIPPED | OUTPATIENT
Start: 2024-03-05

## 2024-03-05 RX ORDER — TESTOSTERONE CYPIONATE 200 MG/ML
200 INJECTION, SOLUTION INTRAMUSCULAR
Qty: 2 ML | Refills: 5 | Status: SHIPPED | OUTPATIENT
Start: 2024-03-05

## 2024-03-05 NOTE — ASSESSMENT & PLAN NOTE
Psychological condition is unchanged.  Medication changes per orders.  Psychological condition  will be reassessed at the next regular appointment.  Transition back to Adderall extended release      The patient has read and signed the [facility] Controlled Substance Contract.  I will continue to see patient for regular follow up appointments.  They are well controlled on their medication.  TOM is updated every 3 months. The patient is aware of the potential for addiction and dependence.

## 2024-03-05 NOTE — PROGRESS NOTES
Subjective     Chief Complaint  ADHD (Would like to change vyvanse)    Subjective          Behzad Chi is a 43 y.o. male who presents today to [facility] for follow up.    HPI:   History of Present Illness    Behzad is a very pleasant 43-year-old male who presents today to follow-up on ADHD, low testosterone an ED.     for the testosterone and ED he is very well-controlled with replacement with injections and Viagra.      ADHD, we tried to transition to Vyvanse from Adderall but he had significant fatigue and did not feel that it helped his symptoms at all.  We transitioned back to Adderall 30 mg daily along with 10 mg in the afternoon.  He reports that he has seen significant benefit with this regimen.      The following portions of the patient's history were reviewed and updated as appropriate: allergies, current medications, past family history, past medical history, past social history, past surgical history and problem list.    Objective     Objective     Allergy:   No Known Allergies     Current Medications:   Current Outpatient Medications   Medication Sig Dispense Refill    amphetamine-dextroamphetamine (Adderall) 10 MG tablet Take 1 pill orally every afternoon 30 tablet 0    amphetamine-dextroamphetamine XR (Adderall XR) 30 MG 24 hr capsule Take 1 capsule by mouth Every Morning 30 capsule 0    sildenafil (Viagra) 100 MG tablet Take 1 tablet by mouth Daily As Needed for Erectile Dysfunction. 30 tablet 2    Testosterone Cypionate (DEPOTESTOTERONE CYPIONATE) 200 MG/ML injection Inject 1 mL into the appropriate muscle as directed by prescriber Every 14 (Fourteen) Days. 2 mL 5    naloxone (NARCAN) 4 MG/0.1ML nasal spray       Sublocade 300 MG/1.5ML solution prefilled syringe INJECT 300MG SUBCUTANEOUSLY ONCE MONTHLY      vitamin D (ERGOCALCIFEROL) 1.25 MG (14004 UT) capsule capsule Take 1 capsule by mouth 1 (One) Time Per Week. 12 capsule 1     No current  "facility-administered medications for this visit.       Past Medical History:  Past Medical History:   Diagnosis Date    ADHD     Depression     Erectile dysfunction 01/2022    PTSD (post-traumatic stress disorder)        Past Surgical History:  Past Surgical History:   Procedure Laterality Date    APPENDECTOMY      CIRCUMCISION      COLONOSCOPY      Need to schedule one       Social History:  Social History     Socioeconomic History    Marital status: Legally    Tobacco Use    Smoking status: Every Day     Current packs/day: 0.50     Average packs/day: 0.5 packs/day for 21.0 years (10.5 ttl pk-yrs)     Types: Cigarettes, Electronic Cigarette     Passive exposure: Current    Smokeless tobacco: Never    Tobacco comments:     Quit for 7 years and started back 6 months ago   Vaping Use    Vaping status: Never Used   Substance and Sexual Activity    Alcohol use: Not Currently    Drug use: Not Currently    Sexual activity: Yes     Partners: Female     Birth control/protection: None       Family History:  Family History   Problem Relation Age of Onset    Diabetes Mother     Cancer Father     Other Father     Cancer Paternal Grandfather          Vital Signs:   /70   Pulse 90   Temp 98.6 °F (37 °C) (Infrared)   Ht 165.1 cm (65\")   Wt 85.3 kg (188 lb)   BMI 31.28 kg/m²      Physical Exam:  Physical Exam  Constitutional:       Appearance: He is not ill-appearing.   Eyes:      Pupils: Pupils are equal, round, and reactive to light.   Cardiovascular:      Rate and Rhythm: Normal rate.      Pulses: Normal pulses.   Pulmonary:      Effort: Pulmonary effort is normal.      Breath sounds: Normal breath sounds.   Neurological:      General: No focal deficit present.      Mental Status: He is alert. Mental status is at baseline.               PHQ-9 Score  PHQ-9 Total Score: 0     Lab Review  Lab on 01/11/2024   Component Date Value Ref Range Status    Testosterone, Total 01/11/2024 >1500 (H)  264 - 916 ng/dL Final "    Adult male reference interval is based on a population of  healthy nonobese males (BMI <30) between 19 and 39 years old.  Liane et.al. JCEM 2017,102;4244-9994. PMID: 00040684.    Testosterone, Free 01/11/2024 37.6 (H)  6.8 - 21.5 pg/mL Final    Glucose 01/11/2024 106 (H)  65 - 99 mg/dL Final    BUN 01/11/2024 11  6 - 20 mg/dL Final    Creatinine 01/11/2024 1.17  0.76 - 1.27 mg/dL Final    Sodium 01/11/2024 139  136 - 145 mmol/L Final    Potassium 01/11/2024 4.5  3.5 - 5.2 mmol/L Final    Chloride 01/11/2024 104  98 - 107 mmol/L Final    CO2 01/11/2024 25.0  22.0 - 29.0 mmol/L Final    Calcium 01/11/2024 9.1  8.6 - 10.5 mg/dL Final    Total Protein 01/11/2024 7.2  6.0 - 8.5 g/dL Final    Albumin 01/11/2024 4.4  3.5 - 5.2 g/dL Final    ALT (SGPT) 01/11/2024 22  1 - 41 U/L Final    AST (SGOT) 01/11/2024 22  1 - 40 U/L Final    Alkaline Phosphatase 01/11/2024 93  39 - 117 U/L Final    Total Bilirubin 01/11/2024 0.7  0.0 - 1.2 mg/dL Final    Globulin 01/11/2024 2.8  gm/dL Final    A/G Ratio 01/11/2024 1.6  g/dL Final    BUN/Creatinine Ratio 01/11/2024 9.4  7.0 - 25.0 Final    Anion Gap 01/11/2024 10.0  5.0 - 15.0 mmol/L Final    eGFR 01/11/2024 79.3  >60.0 mL/min/1.73 Final    TSH 01/11/2024 0.317  0.270 - 4.200 uIU/mL Final    25 Hydroxy, Vitamin D 01/11/2024 11.5 (L)  30.0 - 100.0 ng/ml Final    Vitamin B-12 01/11/2024 611  211 - 946 pg/mL Final    WBC 01/11/2024 6.50  3.40 - 10.80 10*3/mm3 Final    RBC 01/11/2024 6.02 (H)  4.14 - 5.80 10*6/mm3 Final    Hemoglobin 01/11/2024 16.9  13.0 - 17.7 g/dL Final    Hematocrit 01/11/2024 49.8  37.5 - 51.0 % Final    MCV 01/11/2024 82.7  79.0 - 97.0 fL Final    MCH 01/11/2024 28.1  26.6 - 33.0 pg Final    MCHC 01/11/2024 33.9  31.5 - 35.7 g/dL Final    RDW 01/11/2024 13.0  12.3 - 15.4 % Final    RDW-SD 01/11/2024 38.9  37.0 - 54.0 fl Final    MPV 01/11/2024 10.7  6.0 - 12.0 fL Final    Platelets 01/11/2024 252  140 - 450 10*3/mm3 Final    Neutrophil % 01/11/2024 57.9  42.7  - 76.0 % Final    Lymphocyte % 01/11/2024 29.8  19.6 - 45.3 % Final    Monocyte % 01/11/2024 9.7  5.0 - 12.0 % Final    Eosinophil % 01/11/2024 1.8  0.3 - 6.2 % Final    Basophil % 01/11/2024 0.6  0.0 - 1.5 % Final    Immature Grans % 01/11/2024 0.2  0.0 - 0.5 % Final    Neutrophils, Absolute 01/11/2024 3.76  1.70 - 7.00 10*3/mm3 Final    Lymphocytes, Absolute 01/11/2024 1.94  0.70 - 3.10 10*3/mm3 Final    Monocytes, Absolute 01/11/2024 0.63  0.10 - 0.90 10*3/mm3 Final    Eosinophils, Absolute 01/11/2024 0.12  0.00 - 0.40 10*3/mm3 Final    Basophils, Absolute 01/11/2024 0.04  0.00 - 0.20 10*3/mm3 Final    Immature Grans, Absolute 01/11/2024 0.01  0.00 - 0.05 10*3/mm3 Final    nRBC 01/11/2024 0.0  0.0 - 0.2 /100 WBC Final    Testosterone, Total 01/11/2024 >1,500.00 (H)  249.00 - 836.00 ng/dL Final        Radiology Results        Assessment / Plan         Assessment and Plan   Diagnoses and all orders for this visit:    1. Attention deficit hyperactivity disorder (ADHD), predominantly inattentive type (Primary)  Assessment & Plan:  Psychological condition is unchanged.  Medication changes per orders.  Psychological condition  will be reassessed at the next regular appointment.  Transition back to Adderall extended release      The patient has read and signed the [facility] Controlled Substance Contract.  I will continue to see patient for regular follow up appointments.  They are well controlled on their medication.  TOM is updated every 3 months. The patient is aware of the potential for addiction and dependence.      Orders:  -     amphetamine-dextroamphetamine XR (Adderall XR) 30 MG 24 hr capsule; Take 1 capsule by mouth Every Morning  Dispense: 30 capsule; Refill: 0  -     amphetamine-dextroamphetamine (Adderall) 10 MG tablet; Take 1 pill orally every afternoon  Dispense: 30 tablet; Refill: 0    2. Vitamin D deficiency  -     vitamin D (ERGOCALCIFEROL) 1.25 MG (91023 UT) capsule capsule; Take 1 capsule by  mouth 1 (One) Time Per Week.  Dispense: 12 capsule; Refill: 1    3. Low testosterone in male  -     Testosterone Cypionate (DEPOTESTOTERONE CYPIONATE) 200 MG/ML injection; Inject 1 mL into the appropriate muscle as directed by prescriber Every 14 (Fourteen) Days.  Dispense: 2 mL; Refill: 5    4. Erectile dysfunction, unspecified erectile dysfunction type  -     sildenafil (Viagra) 100 MG tablet; Take 1 tablet by mouth Daily As Needed for Erectile Dysfunction.  Dispense: 30 tablet; Refill: 2        Discussed possible differential diagnoses, testing, treatment, recommended non-pharmacological interventions, risks, warning signs to monitor for that would indicate need for follow-up in clinic or ER. If no improvement with these regimens or you have new or worsening symptoms follow-up. Patient verbalizes understanding and agreement with plan of care. Denies further needs or concerns.     Patient was given instructions and counseling regarding her condition and for health maintenance advised.            BMI is >= 30 and <35. (Class 1 Obesity). The following options were offered after discussion;: weight loss educational material (shared in after visit summary)           Health Maintenance  Health Maintenance: There are no preventive care reminders to display for this patient.     Meds ordered during this visit  New Medications Ordered This Visit   Medications    vitamin D (ERGOCALCIFEROL) 1.25 MG (65740 UT) capsule capsule     Sig: Take 1 capsule by mouth 1 (One) Time Per Week.     Dispense:  12 capsule     Refill:  1    amphetamine-dextroamphetamine XR (Adderall XR) 30 MG 24 hr capsule     Sig: Take 1 capsule by mouth Every Morning     Dispense:  30 capsule     Refill:  0    amphetamine-dextroamphetamine (Adderall) 10 MG tablet     Sig: Take 1 pill orally every afternoon     Dispense:  30 tablet     Refill:  0    Testosterone Cypionate (DEPOTESTOTERONE CYPIONATE) 200 MG/ML injection     Sig: Inject 1 mL into the  appropriate muscle as directed by prescriber Every 14 (Fourteen) Days.     Dispense:  2 mL     Refill:  5    sildenafil (Viagra) 100 MG tablet     Sig: Take 1 tablet by mouth Daily As Needed for Erectile Dysfunction.     Dispense:  30 tablet     Refill:  2       Meds stopped during this visit:  Medications Discontinued During This Encounter   Medication Reason    Testosterone Cypionate (DEPOTESTOTERONE CYPIONATE) 200 MG/ML injection Reorder    sildenafil (Viagra) 100 MG tablet Reorder    amphetamine-dextroamphetamine XR (Adderall XR) 30 MG 24 hr capsule Reorder    amphetamine-dextroamphetamine (Adderall) 10 MG tablet Reorder        Visit Diagnoses    ICD-10-CM ICD-9-CM   1. Attention deficit hyperactivity disorder (ADHD), predominantly inattentive type  F90.0 314.00   2. Vitamin D deficiency  E55.9 268.9   3. Low testosterone in male  R79.89 790.99   4. Erectile dysfunction, unspecified erectile dysfunction type  N52.9 607.84       Patient was given instructions and counseling regarding his condition or for health maintenance advice. Please see specific information pulled into the AVS if appropriate.     Follow Up   Return in about 3 months (around 6/5/2024) for followup ADHD .      This document has been electronically signed by Cristiana Jackson DO   March 5, 2024 11:04 EST    Dictated Utilizing Dragon Dictation: Part of this note may be an electronic transcription/translation of spoken language to printed text using the Dragon Dictation System.    Cristiana Jackson D.O.  [facility] [place]   Answers submitted by the patient for this visit:  Primary Reason for Visit (Submitted on 3/5/2024)  What is the primary reason for your visit?: Other  Other (Submitted on 3/5/2024)  Please describe your symptoms.: No symptoms. Just a check up  Have you had these symptoms before?: No  How long have you been having these symptoms?: Greater than 2 weeks  Please list any medications you are currently taking for this  condition.: Adderral, sublicade, testosterone  Please describe any probable cause for these symptoms. : Na

## 2024-04-02 DIAGNOSIS — N52.9 ERECTILE DYSFUNCTION, UNSPECIFIED ERECTILE DYSFUNCTION TYPE: ICD-10-CM

## 2024-04-02 DIAGNOSIS — R79.89 LOW TESTOSTERONE IN MALE: ICD-10-CM

## 2024-04-02 DIAGNOSIS — E55.9 VITAMIN D DEFICIENCY: ICD-10-CM

## 2024-04-02 DIAGNOSIS — F90.0 ATTENTION DEFICIT HYPERACTIVITY DISORDER (ADHD), PREDOMINANTLY INATTENTIVE TYPE: ICD-10-CM

## 2024-04-02 RX ORDER — DEXTROAMPHETAMINE SACCHARATE, AMPHETAMINE ASPARTATE MONOHYDRATE, DEXTROAMPHETAMINE SULFATE AND AMPHETAMINE SULFATE 7.5; 7.5; 7.5; 7.5 MG/1; MG/1; MG/1; MG/1
30 CAPSULE, EXTENDED RELEASE ORAL EVERY MORNING
Qty: 30 CAPSULE | Refills: 0 | Status: SHIPPED | OUTPATIENT
Start: 2024-04-02

## 2024-04-02 RX ORDER — DEXTROAMPHETAMINE SACCHARATE, AMPHETAMINE ASPARTATE, DEXTROAMPHETAMINE SULFATE AND AMPHETAMINE SULFATE 2.5; 2.5; 2.5; 2.5 MG/1; MG/1; MG/1; MG/1
TABLET ORAL
Qty: 30 TABLET | Refills: 0 | Status: SHIPPED | OUTPATIENT
Start: 2024-04-02

## 2024-04-02 RX ORDER — ERGOCALCIFEROL 1.25 MG/1
50000 CAPSULE ORAL WEEKLY
Qty: 12 CAPSULE | Refills: 1 | Status: SHIPPED | OUTPATIENT
Start: 2024-04-02

## 2024-04-02 RX ORDER — SILDENAFIL 100 MG/1
100 TABLET, FILM COATED ORAL DAILY PRN
Qty: 30 TABLET | Refills: 2 | Status: SHIPPED | OUTPATIENT
Start: 2024-04-02

## 2024-04-02 RX ORDER — TESTOSTERONE CYPIONATE 200 MG/ML
200 INJECTION, SOLUTION INTRAMUSCULAR
Qty: 2 ML | Refills: 5 | Status: SHIPPED | OUTPATIENT
Start: 2024-04-02

## 2024-04-02 NOTE — TELEPHONE ENCOUNTER
Caller: Behzad Chi    Relationship: Self    Best call back number: [phone]     Requested Prescriptions:   Requested Prescriptions     Pending Prescriptions Disp Refills    sildenafil (Viagra) 100 MG tablet 30 tablet 2     Sig: Take 1 tablet by mouth Daily As Needed for Erectile Dysfunction.        Pharmacy where request should be sent: [facility] [id number] [address]  AT [facility] [address] - [phone]  - [phone] FX     Last office visit with prescribing clinician: 3/5/2024   Last telemedicine visit with prescribing clinician: Visit date not found   Next office visit with prescribing clinician: 6/6/2024     Additional details provided by patient: OUT OF MEDICATION, THIS ONE IS ONLY ONE TO GO TO [facility]     Does the patient have less than a 3 day supply:  [x] Yes  [] No    Would you like a call back once the refill request has been completed: [] Yes [x] No    If the office needs to give you a call back, can they leave a voicemail: [] Yes [x] No    Suze Kinsey Rep   04/02/24 12:43 EDT

## 2024-04-02 NOTE — TELEPHONE ENCOUNTER
Caller: Behzad Chi    Relationship: Self    Best call back number: [phone]     Requested Prescriptions:   Requested Prescriptions     Pending Prescriptions Disp Refills    amphetamine-dextroamphetamine (Adderall) 10 MG tablet 30 tablet 0     Sig: Take 1 pill orally every afternoon    amphetamine-dextroamphetamine XR (Adderall XR) 30 MG 24 hr capsule 30 capsule 0     Sig: Take 1 capsule by mouth Every Morning    Testosterone Cypionate (DEPOTESTOTERONE CYPIONATE) 200 MG/ML injection 2 mL 5     Sig: Inject 1 mL into the appropriate muscle as directed by prescriber Every 14 (Fourteen) Days.    vitamin D (ERGOCALCIFEROL) 1.25 MG (97606 UT) capsule capsule 12 capsule 1     Sig: Take 1 capsule by mouth 1 (One) Time Per Week.        Pharmacy where request should be sent: [place], [place] - [address] [place] [address] LN - [phone]  - [phone] FX     Last office visit with prescribing clinician: 3/5/2024   Last telemedicine visit with prescribing clinician: Visit date not found   Next office visit with prescribing clinician: 6/6/2024     Additional details provided by patient: OUT OF MEDICATION     Does the patient have less than a 3 day supply:  [x] Yes  [] No    Would you like a call back once the refill request has been completed: [] Yes [x] No    If the office needs to give you a call back, can they leave a voicemail: [] Yes [x] No    Suze Kinsey   04/02/24 12:42 EDT

## 2024-04-30 DIAGNOSIS — E55.9 VITAMIN D DEFICIENCY: ICD-10-CM

## 2024-04-30 DIAGNOSIS — R79.89 LOW TESTOSTERONE IN MALE: ICD-10-CM

## 2024-04-30 DIAGNOSIS — F90.0 ATTENTION DEFICIT HYPERACTIVITY DISORDER (ADHD), PREDOMINANTLY INATTENTIVE TYPE: ICD-10-CM

## 2024-04-30 RX ORDER — TESTOSTERONE CYPIONATE 200 MG/ML
200 INJECTION, SOLUTION INTRAMUSCULAR
Qty: 2 ML | Refills: 5 | Status: CANCELLED | OUTPATIENT
Start: 2024-04-30

## 2024-04-30 RX ORDER — ERGOCALCIFEROL 1.25 MG/1
50000 CAPSULE ORAL WEEKLY
Qty: 12 CAPSULE | Refills: 1 | Status: CANCELLED | OUTPATIENT
Start: 2024-04-30

## 2024-04-30 RX ORDER — DEXTROAMPHETAMINE SACCHARATE, AMPHETAMINE ASPARTATE MONOHYDRATE, DEXTROAMPHETAMINE SULFATE AND AMPHETAMINE SULFATE 7.5; 7.5; 7.5; 7.5 MG/1; MG/1; MG/1; MG/1
30 CAPSULE, EXTENDED RELEASE ORAL EVERY MORNING
Qty: 30 CAPSULE | Refills: 0 | Status: SHIPPED | OUTPATIENT
Start: 2024-04-30

## 2024-04-30 RX ORDER — DEXTROAMPHETAMINE SACCHARATE, AMPHETAMINE ASPARTATE, DEXTROAMPHETAMINE SULFATE AND AMPHETAMINE SULFATE 2.5; 2.5; 2.5; 2.5 MG/1; MG/1; MG/1; MG/1
TABLET ORAL
Qty: 30 TABLET | Refills: 0 | Status: SHIPPED | OUTPATIENT
Start: 2024-04-30

## 2024-04-30 NOTE — TELEPHONE ENCOUNTER
Caller: Behzad Chi    Relationship: Self    Best call back number: [phone]     Requested Prescriptions:   Requested Prescriptions     Pending Prescriptions Disp Refills    amphetamine-dextroamphetamine (Adderall) 10 MG tablet 30 tablet 0     Sig: Take 1 pill orally every afternoon    amphetamine-dextroamphetamine XR (Adderall XR) 30 MG 24 hr capsule 30 capsule 0     Sig: Take 1 capsule by mouth Every Morning    Testosterone Cypionate (DEPOTESTOTERONE CYPIONATE) 200 MG/ML injection 2 mL 5     Sig: Inject 1 mL into the appropriate muscle as directed by prescriber Every 14 (Fourteen) Days.    vitamin D (ERGOCALCIFEROL) 1.25 MG (98223 UT) capsule capsule 12 capsule 1     Sig: Take 1 capsule by mouth 1 (One) Time Per Week.        Pharmacy where request should be sent: [place], [place] - [address] [place] [address] LN - [phone]  - [phone] FX     Last office visit with prescribing clinician: 3/5/2024   Last telemedicine visit with prescribing clinician: Visit date not found   Next office visit with prescribing clinician: 6/6/2024     Additional details provided by patient: HAS 3 DAYS LEFT. PATIENT WILL BE LEAVING TO GO OUT OF TOWN ON FRIDAY MORNING. REQUEST REFILL    Does the patient have less than a 3 day supply:  [x] Yes  [] No    Would you like a call back once the refill request has been completed: [x] Yes [] No    If the office needs to give you a call back, can they leave a voicemail: [x] Yes [] No    Suze Chen Rep   04/30/24 13:12 EDT

## 2024-05-14 ENCOUNTER — TELEPHONE (OUTPATIENT)
Dept: FAMILY MEDICINE CLINIC | Facility: CLINIC | Age: 44
End: 2024-05-14
Payer: COMMERCIAL

## 2024-05-14 NOTE — TELEPHONE ENCOUNTER
HUB TO RELAY    CAYR Wen,   We got a refill request for your vitamin d and testosterone. These were sent to [facility] last month. You just need to call them and ask them to fill them as they have refills on them.

## 2024-05-28 ENCOUNTER — TELEPHONE (OUTPATIENT)
Dept: FAMILY MEDICINE CLINIC | Facility: CLINIC | Age: 44
End: 2024-05-28
Payer: COMMERCIAL

## 2024-05-28 DIAGNOSIS — N52.9 ERECTILE DYSFUNCTION, UNSPECIFIED ERECTILE DYSFUNCTION TYPE: ICD-10-CM

## 2024-05-28 DIAGNOSIS — F90.0 ATTENTION DEFICIT HYPERACTIVITY DISORDER (ADHD), PREDOMINANTLY INATTENTIVE TYPE: ICD-10-CM

## 2024-05-28 DIAGNOSIS — R79.89 LOW TESTOSTERONE IN MALE: ICD-10-CM

## 2024-05-28 DIAGNOSIS — E55.9 VITAMIN D DEFICIENCY: ICD-10-CM

## 2024-05-28 RX ORDER — SILDENAFIL 100 MG/1
100 TABLET, FILM COATED ORAL DAILY PRN
Qty: 30 TABLET | Refills: 2 | Status: SHIPPED | OUTPATIENT
Start: 2024-05-28

## 2024-05-28 RX ORDER — TESTOSTERONE CYPIONATE 200 MG/ML
200 INJECTION, SOLUTION INTRAMUSCULAR
Qty: 2 ML | Refills: 5 | Status: CANCELLED | OUTPATIENT
Start: 2024-05-28

## 2024-05-28 RX ORDER — ERGOCALCIFEROL 1.25 MG/1
50000 CAPSULE ORAL WEEKLY
Qty: 12 CAPSULE | Refills: 1 | Status: CANCELLED | OUTPATIENT
Start: 2024-05-28

## 2024-05-28 RX ORDER — DEXTROAMPHETAMINE SACCHARATE, AMPHETAMINE ASPARTATE, DEXTROAMPHETAMINE SULFATE AND AMPHETAMINE SULFATE 2.5; 2.5; 2.5; 2.5 MG/1; MG/1; MG/1; MG/1
TABLET ORAL
Qty: 30 TABLET | Refills: 0 | Status: SHIPPED | OUTPATIENT
Start: 2024-05-28

## 2024-05-28 RX ORDER — DEXTROAMPHETAMINE SACCHARATE, AMPHETAMINE ASPARTATE MONOHYDRATE, DEXTROAMPHETAMINE SULFATE AND AMPHETAMINE SULFATE 7.5; 7.5; 7.5; 7.5 MG/1; MG/1; MG/1; MG/1
30 CAPSULE, EXTENDED RELEASE ORAL EVERY MORNING
Qty: 30 CAPSULE | Refills: 0 | Status: SHIPPED | OUTPATIENT
Start: 2024-05-28

## 2024-05-28 NOTE — TELEPHONE ENCOUNTER
Patient has questions regarding his insurance, the hub says that someone told him that Dr. Jackson does not take his insurance and he is needing a medication.  Phone # [phone]

## 2024-05-28 NOTE — TELEPHONE ENCOUNTER
Caller: Behzad Chi    Relationship: Self    Best call back number: [phone]     Requested Prescriptions:   Requested Prescriptions     Pending Prescriptions Disp Refills    sildenafil (Viagra) 100 MG tablet 30 tablet 2     Sig: Take 1 tablet by mouth Daily As Needed for Erectile Dysfunction.        Pharmacy where request should be sent: [facility] [id number] [address]  AT [facility] & [address] [place] [address] - [phone]  - [phone] FX     Last office visit with prescribing clinician: 3/5/2024   Last telemedicine visit with prescribing clinician: Visit date not found   Next office visit with prescribing clinician: 6/6/2024     Additional details provided by patient:     Does the patient have less than a 3 day supply:  X Yes  [] No    Would you like a call back once the refill request has been completed: [] Yes [x] No    If the office needs to give you a call back, can they leave a voicemail: [] Yes [x] No    Suze Mendieta Rep   05/28/24 10:57 EDT

## 2024-05-28 NOTE — TELEPHONE ENCOUNTER
Spoke with patient and issue is with insurance. I told him he will have to wait on system to update to fill meds.

## 2024-05-28 NOTE — TELEPHONE ENCOUNTER
Caller: Behzad Chi    Relationship: Self    Best call back number: [phone]     Requested Prescriptions:   Requested Prescriptions     Pending Prescriptions Disp Refills    amphetamine-dextroamphetamine (Adderall) 10 MG tablet 30 tablet 0     Sig: Take 1 pill orally every afternoon    amphetamine-dextroamphetamine XR (Adderall XR) 30 MG 24 hr capsule 30 capsule 0     Sig: Take 1 capsule by mouth Every Morning    vitamin D (ERGOCALCIFEROL) 1.25 MG (35088 UT) capsule capsule 12 capsule 1     Sig: Take 1 capsule by mouth 1 (One) Time Per Week.    Testosterone Cypionate (DEPOTESTOTERONE CYPIONATE) 200 MG/ML injection 2 mL 5     Sig: Inject 1 mL into the appropriate muscle as directed by prescriber Every 14 (Fourteen) Days.        Pharmacy where request should be sent: [place], [place] - [address] [place] [address] LN - [phone]  - [phone] FX     Last office visit with prescribing clinician: 3/5/2024   Last telemedicine visit with prescribing clinician: Visit date not found   Next office visit with prescribing clinician: 6/6/2024     Additional details provided by patient:     Does the patient have less than a 3 day supply:  [x] Yes  [] No    Would you like a call back once the refill request has been completed: [] Yes [x] No    If the office needs to give you a call back, can they leave a voicemail: [] Yes [x] No    Suze Mendieta Rep   05/28/24 10:56 EDT

## 2024-06-06 ENCOUNTER — OFFICE VISIT (OUTPATIENT)
Dept: FAMILY MEDICINE CLINIC | Facility: CLINIC | Age: 44
End: 2024-06-06
Payer: COMMERCIAL

## 2024-06-06 VITALS
WEIGHT: 182.4 LBS | DIASTOLIC BLOOD PRESSURE: 62 MMHG | BODY MASS INDEX: 30.39 KG/M2 | SYSTOLIC BLOOD PRESSURE: 108 MMHG | TEMPERATURE: 98.6 F | OXYGEN SATURATION: 99 % | HEART RATE: 95 BPM | HEIGHT: 65 IN

## 2024-06-06 DIAGNOSIS — F90.0 ATTENTION DEFICIT HYPERACTIVITY DISORDER (ADHD), PREDOMINANTLY INATTENTIVE TYPE: Primary | ICD-10-CM

## 2024-06-06 DIAGNOSIS — R79.89 LOW TESTOSTERONE IN MALE: ICD-10-CM

## 2024-06-06 DIAGNOSIS — E55.9 VITAMIN D DEFICIENCY: ICD-10-CM

## 2024-06-06 DIAGNOSIS — N52.9 ERECTILE DYSFUNCTION, UNSPECIFIED ERECTILE DYSFUNCTION TYPE: ICD-10-CM

## 2024-06-06 PROCEDURE — 1160F RVW MEDS BY RX/DR IN RCRD: CPT | Performed by: FAMILY MEDICINE

## 2024-06-06 PROCEDURE — 1126F AMNT PAIN NOTED NONE PRSNT: CPT | Performed by: FAMILY MEDICINE

## 2024-06-06 PROCEDURE — 1159F MED LIST DOCD IN RCRD: CPT | Performed by: FAMILY MEDICINE

## 2024-06-06 PROCEDURE — 99214 OFFICE O/P EST MOD 30 MIN: CPT | Performed by: FAMILY MEDICINE

## 2024-06-06 RX ORDER — DEXTROAMPHETAMINE SACCHARATE, AMPHETAMINE ASPARTATE MONOHYDRATE, DEXTROAMPHETAMINE SULFATE AND AMPHETAMINE SULFATE 7.5; 7.5; 7.5; 7.5 MG/1; MG/1; MG/1; MG/1
30 CAPSULE, EXTENDED RELEASE ORAL EVERY MORNING
Qty: 30 CAPSULE | Refills: 0 | Status: SHIPPED | OUTPATIENT
Start: 2024-06-06

## 2024-06-06 RX ORDER — SILDENAFIL 100 MG/1
100 TABLET, FILM COATED ORAL DAILY PRN
Qty: 30 TABLET | Refills: 2 | Status: SHIPPED | OUTPATIENT
Start: 2024-06-06

## 2024-06-06 RX ORDER — TESTOSTERONE CYPIONATE 200 MG/ML
200 INJECTION, SOLUTION INTRAMUSCULAR
Qty: 2 ML | Refills: 5 | Status: SHIPPED | OUTPATIENT
Start: 2024-06-06

## 2024-06-06 RX ORDER — ERGOCALCIFEROL 1.25 MG/1
50000 CAPSULE ORAL WEEKLY
Qty: 12 CAPSULE | Refills: 1 | Status: SHIPPED | OUTPATIENT
Start: 2024-06-06

## 2024-06-06 RX ORDER — DEXTROAMPHETAMINE SACCHARATE, AMPHETAMINE ASPARTATE, DEXTROAMPHETAMINE SULFATE AND AMPHETAMINE SULFATE 2.5; 2.5; 2.5; 2.5 MG/1; MG/1; MG/1; MG/1
TABLET ORAL
Qty: 30 TABLET | Refills: 0 | Status: SHIPPED | OUTPATIENT
Start: 2024-06-06

## 2024-06-06 NOTE — ASSESSMENT & PLAN NOTE
Psychological condition is stable.  Continue current treatment regimen.  Psychological condition  will be reassessed in 3 months.    The patient has read and signed the [facility] Controlled Substance Contract.  I will continue to see patient for regular follow up appointments.  They are well controlled on their medication.  TOM is updated every 3 months. The patient is aware of the potential for addiction and dependence.

## 2024-06-06 NOTE — PROGRESS NOTES
Subjective     Chief Complaint  ADD    Subjective          Behzad Chi is a 43 y.o. male who presents today to [facility] for follow up.    HPI:   History of Present Illness    Behzad is a very pleasant 43-year-old male presents today to follow-up on ADHD and low testosterone.    His current prescription is Adderall 30 mg extended release and Adderall 10 mg daily in the afternoon. He feels stable with this combination of medications.     He has also been stable on his testosterone dose as well and is able to self administer at home. His last injection was two days ago.     He is due for repeat labs.     The following portions of the patient's history were reviewed and updated as appropriate: allergies, current medications, past family history, past medical history, past social history, past surgical history and problem list.    Objective     Objective     Allergy:   No Known Allergies     Current Medications:   Current Outpatient Medications   Medication Sig Dispense Refill    amphetamine-dextroamphetamine (Adderall) 10 MG tablet Take 1 pill orally every afternoon 30 tablet 0    amphetamine-dextroamphetamine XR (Adderall XR) 30 MG 24 hr capsule Take 1 capsule by mouth Every Morning 30 capsule 0    naloxone (NARCAN) 4 MG/0.1ML nasal spray       sildenafil (Viagra) 100 MG tablet Take 1 tablet by mouth Daily As Needed for Erectile Dysfunction. 30 tablet 2    Sublocade 300 MG/1.5ML solution prefilled syringe INJECT 300MG SUBCUTANEOUSLY ONCE MONTHLY      Testosterone Cypionate (DEPOTESTOTERONE CYPIONATE) 200 MG/ML injection Inject 1 mL into the appropriate muscle as directed by prescriber Every 14 (Fourteen) Days. 2 mL 5    vitamin D (ERGOCALCIFEROL) 1.25 MG (35961 UT) capsule capsule Take 1 capsule by mouth 1 (One) Time Per Week. 12 capsule 1     No current facility-administered medications for this visit.       Past Medical History:  Past Medical History:   Diagnosis Date    ADHD   "   Depression     Erectile dysfunction 01/2022    PTSD (post-traumatic stress disorder)        Past Surgical History:  Past Surgical History:   Procedure Laterality Date    APPENDECTOMY      CIRCUMCISION      COLONOSCOPY      Need to schedule one       Social History:  Social History     Socioeconomic History    Marital status: Legally    Tobacco Use    Smoking status: Every Day     Current packs/day: 0.50     Average packs/day: 0.5 packs/day for 21.0 years (10.5 ttl pk-yrs)     Types: Cigarettes, Electronic Cigarette     Passive exposure: Current    Smokeless tobacco: Never    Tobacco comments:     Quit for 7 years and started back 6 months ago   Vaping Use    Vaping status: Every Day    Substances: Nicotine, Flavoring    Devices: Disposable, Pre-filled pod    Passive vaping exposure: Yes   Substance and Sexual Activity    Alcohol use: Not Currently    Drug use: Not Currently    Sexual activity: Yes     Partners: Female     Birth control/protection: None       Family History:  Family History   Problem Relation Age of Onset    Diabetes Mother     Cancer Father     Other Father     Cancer Paternal Grandfather            Vital Signs:   /62   Pulse 95   Temp 98.6 °F (37 °C) (Temporal)   Ht 165.1 cm (65\")   Wt 82.7 kg (182 lb 6.4 oz)   SpO2 99%   BMI 30.35 kg/m²      Physical Exam:  Physical Exam  Vitals reviewed.   Constitutional:       Appearance: He is not ill-appearing.   Eyes:      Pupils: Pupils are equal, round, and reactive to light.   Cardiovascular:      Rate and Rhythm: Normal rate.      Pulses: Normal pulses.   Pulmonary:      Effort: Pulmonary effort is normal.      Breath sounds: Normal breath sounds.   Neurological:      General: No focal deficit present.      Mental Status: He is alert. Mental status is at baseline.   Psychiatric:         Mood and Affect: Mood normal.         Behavior: Behavior normal.         Thought Content: Thought content normal.         Judgment: Judgment " normal.               PHQ-9 Score  PHQ-9 Total Score:       Lab Review  No visits with results within 3 Month(s) from this visit.   Latest known visit with results is:   Lab on 01/11/2024   Component Date Value Ref Range Status    Testosterone, Total 01/11/2024 >1500 (H)  264 - 916 ng/dL Final    Adult male reference interval is based on a population of  healthy nonobese males (BMI <30) between 19 and 39 years old.  Liane et.al. JCEM 2017,102;5086-0992. PMID: 38547681.    Testosterone, Free 01/11/2024 37.6 (H)  6.8 - 21.5 pg/mL Final    Glucose 01/11/2024 106 (H)  65 - 99 mg/dL Final    BUN 01/11/2024 11  6 - 20 mg/dL Final    Creatinine 01/11/2024 1.17  0.76 - 1.27 mg/dL Final    Sodium 01/11/2024 139  136 - 145 mmol/L Final    Potassium 01/11/2024 4.5  3.5 - 5.2 mmol/L Final    Chloride 01/11/2024 104  98 - 107 mmol/L Final    CO2 01/11/2024 25.0  22.0 - 29.0 mmol/L Final    Calcium 01/11/2024 9.1  8.6 - 10.5 mg/dL Final    Total Protein 01/11/2024 7.2  6.0 - 8.5 g/dL Final    Albumin 01/11/2024 4.4  3.5 - 5.2 g/dL Final    ALT (SGPT) 01/11/2024 22  1 - 41 U/L Final    AST (SGOT) 01/11/2024 22  1 - 40 U/L Final    Alkaline Phosphatase 01/11/2024 93  39 - 117 U/L Final    Total Bilirubin 01/11/2024 0.7  0.0 - 1.2 mg/dL Final    Globulin 01/11/2024 2.8  gm/dL Final    A/G Ratio 01/11/2024 1.6  g/dL Final    BUN/Creatinine Ratio 01/11/2024 9.4  7.0 - 25.0 Final    Anion Gap 01/11/2024 10.0  5.0 - 15.0 mmol/L Final    eGFR 01/11/2024 79.3  >60.0 mL/min/1.73 Final    TSH 01/11/2024 0.317  0.270 - 4.200 uIU/mL Final    25 Hydroxy, Vitamin D 01/11/2024 11.5 (L)  30.0 - 100.0 ng/ml Final    Vitamin B-12 01/11/2024 611  211 - 946 pg/mL Final    WBC 01/11/2024 6.50  3.40 - 10.80 10*3/mm3 Final    RBC 01/11/2024 6.02 (H)  4.14 - 5.80 10*6/mm3 Final    Hemoglobin 01/11/2024 16.9  13.0 - 17.7 g/dL Final    Hematocrit 01/11/2024 49.8  37.5 - 51.0 % Final    MCV 01/11/2024 82.7  79.0 - 97.0 fL Final    MCH 01/11/2024 28.1   26.6 - 33.0 pg Final    MCHC 01/11/2024 33.9  31.5 - 35.7 g/dL Final    RDW 01/11/2024 13.0  12.3 - 15.4 % Final    RDW-SD 01/11/2024 38.9  37.0 - 54.0 fl Final    MPV 01/11/2024 10.7  6.0 - 12.0 fL Final    Platelets 01/11/2024 252  140 - 450 10*3/mm3 Final    Neutrophil % 01/11/2024 57.9  42.7 - 76.0 % Final    Lymphocyte % 01/11/2024 29.8  19.6 - 45.3 % Final    Monocyte % 01/11/2024 9.7  5.0 - 12.0 % Final    Eosinophil % 01/11/2024 1.8  0.3 - 6.2 % Final    Basophil % 01/11/2024 0.6  0.0 - 1.5 % Final    Immature Grans % 01/11/2024 0.2  0.0 - 0.5 % Final    Neutrophils, Absolute 01/11/2024 3.76  1.70 - 7.00 10*3/mm3 Final    Lymphocytes, Absolute 01/11/2024 1.94  0.70 - 3.10 10*3/mm3 Final    Monocytes, Absolute 01/11/2024 0.63  0.10 - 0.90 10*3/mm3 Final    Eosinophils, Absolute 01/11/2024 0.12  0.00 - 0.40 10*3/mm3 Final    Basophils, Absolute 01/11/2024 0.04  0.00 - 0.20 10*3/mm3 Final    Immature Grans, Absolute 01/11/2024 0.01  0.00 - 0.05 10*3/mm3 Final    nRBC 01/11/2024 0.0  0.0 - 0.2 /100 WBC Final    Testosterone, Total 01/11/2024 >1,500.00 (H)  249.00 - 836.00 ng/dL Final        Radiology Results        Assessment / Plan         Assessment and Plan   Diagnoses and all orders for this visit:    1. Attention deficit hyperactivity disorder (ADHD), predominantly inattentive type (Primary)  Assessment & Plan:  Psychological condition is stable.  Continue current treatment regimen.  Psychological condition  will be reassessed in 3 months.    The patient has read and signed the [facility] Controlled Substance Contract.  I will continue to see patient for regular follow up appointments.  They are well controlled on their medication.  TOM is updated every 3 months. The patient is aware of the potential for addiction and dependence.     Orders:  -     amphetamine-dextroamphetamine (Adderall) 10 MG tablet; Take 1 pill orally every afternoon  Dispense: 30 tablet; Refill: 0  -      amphetamine-dextroamphetamine XR (Adderall XR) 30 MG 24 hr capsule; Take 1 capsule by mouth Every Morning  Dispense: 30 capsule; Refill: 0  -     CBC & Differential; Future  -     Comprehensive Metabolic Panel; Future  -     TSH Rfx On Abnormal To Free T4; Future    2. Low testosterone in male  Assessment & Plan:  - Levels ordered with labs   - Tolerating injections well without issue.     Orders:  -     Testosterone Cypionate (DEPOTESTOTERONE CYPIONATE) 200 MG/ML injection; Inject 1 mL into the appropriate muscle as directed by prescriber Every 14 (Fourteen) Days.  Dispense: 2 mL; Refill: 5  -     Testosterone; Future  -     Testosterone, Free, Total; Future    3. Erectile dysfunction, unspecified erectile dysfunction type  -     sildenafil (Viagra) 100 MG tablet; Take 1 tablet by mouth Daily As Needed for Erectile Dysfunction.  Dispense: 30 tablet; Refill: 2    4. Vitamin D deficiency  Assessment & Plan:  - Continue replacement therapy, Level ordered with labs     Orders:  -     vitamin D (ERGOCALCIFEROL) 1.25 MG (52973 UT) capsule capsule; Take 1 capsule by mouth 1 (One) Time Per Week.  Dispense: 12 capsule; Refill: 1  -     Vitamin D,25-Hydroxy; Future        Discussed possible differential diagnoses, testing, treatment, recommended non-pharmacological interventions, risks, warning signs to monitor for that would indicate need for follow-up in clinic or ER. If no improvement with these regimens or you have new or worsening symptoms follow-up. Patient verbalizes understanding and agreement with plan of care. Denies further needs or concerns.     Patient was given instructions and counseling regarding her condition and for health maintenance advised.          Health Maintenance  Health Maintenance: There are no preventive care reminders to display for this patient.     Meds ordered during this visit  New Medications Ordered This Visit   Medications    amphetamine-dextroamphetamine (Adderall) 10 MG tablet     Sig:  Take 1 pill orally every afternoon     Dispense:  30 tablet     Refill:  0    amphetamine-dextroamphetamine XR (Adderall XR) 30 MG 24 hr capsule     Sig: Take 1 capsule by mouth Every Morning     Dispense:  30 capsule     Refill:  0    Testosterone Cypionate (DEPOTESTOTERONE CYPIONATE) 200 MG/ML injection     Sig: Inject 1 mL into the appropriate muscle as directed by prescriber Every 14 (Fourteen) Days.     Dispense:  2 mL     Refill:  5    sildenafil (Viagra) 100 MG tablet     Sig: Take 1 tablet by mouth Daily As Needed for Erectile Dysfunction.     Dispense:  30 tablet     Refill:  2    vitamin D (ERGOCALCIFEROL) 1.25 MG (98419 UT) capsule capsule     Sig: Take 1 capsule by mouth 1 (One) Time Per Week.     Dispense:  12 capsule     Refill:  1       Meds stopped during this visit:  Medications Discontinued During This Encounter   Medication Reason    Testosterone Cypionate (DEPOTESTOTERONE CYPIONATE) 200 MG/ML injection Reorder    vitamin D (ERGOCALCIFEROL) 1.25 MG (52164 UT) capsule capsule Reorder    amphetamine-dextroamphetamine (Adderall) 10 MG tablet Reorder    amphetamine-dextroamphetamine XR (Adderall XR) 30 MG 24 hr capsule Reorder    sildenafil (Viagra) 100 MG tablet Reorder        Visit Diagnoses    ICD-10-CM ICD-9-CM   1. Attention deficit hyperactivity disorder (ADHD), predominantly inattentive type  F90.0 314.00   2. Low testosterone in male  R79.89 790.99   3. Erectile dysfunction, unspecified erectile dysfunction type  N52.9 607.84   4. Vitamin D deficiency  E55.9 268.9       Patient was given instructions and counseling regarding his condition or for health maintenance advice. Please see specific information pulled into the AVS if appropriate.     Follow Up   Return in about 3 months (around 9/6/2024) for followup ADHD .        This document has been electronically signed by Cristiana Jackson DO   June 6, 2024 09:19 EDT    Dictated Utilizing Dragon Dictation: Part of this note may be an electronic  transcription/translation of spoken language to printed text using the Dragon Dictation System.    Cristiana Jackson D.O.  [facility] [place] [place] [facility] [place]

## 2024-07-23 DIAGNOSIS — R79.89 LOW TESTOSTERONE IN MALE: ICD-10-CM

## 2024-07-23 DIAGNOSIS — N52.9 ERECTILE DYSFUNCTION, UNSPECIFIED ERECTILE DYSFUNCTION TYPE: ICD-10-CM

## 2024-07-23 DIAGNOSIS — F90.0 ATTENTION DEFICIT HYPERACTIVITY DISORDER (ADHD), PREDOMINANTLY INATTENTIVE TYPE: ICD-10-CM

## 2024-07-23 RX ORDER — DEXTROAMPHETAMINE SACCHARATE, AMPHETAMINE ASPARTATE MONOHYDRATE, DEXTROAMPHETAMINE SULFATE AND AMPHETAMINE SULFATE 7.5; 7.5; 7.5; 7.5 MG/1; MG/1; MG/1; MG/1
30 CAPSULE, EXTENDED RELEASE ORAL EVERY MORNING
Qty: 30 CAPSULE | Refills: 0 | Status: SHIPPED | OUTPATIENT
Start: 2024-07-23

## 2024-07-23 RX ORDER — TESTOSTERONE CYPIONATE 200 MG/ML
200 INJECTION, SOLUTION INTRAMUSCULAR
Qty: 2 ML | Refills: 5 | Status: SHIPPED | OUTPATIENT
Start: 2024-07-23

## 2024-07-23 RX ORDER — DEXTROAMPHETAMINE SACCHARATE, AMPHETAMINE ASPARTATE, DEXTROAMPHETAMINE SULFATE AND AMPHETAMINE SULFATE 2.5; 2.5; 2.5; 2.5 MG/1; MG/1; MG/1; MG/1
TABLET ORAL
Qty: 30 TABLET | Refills: 0 | Status: SHIPPED | OUTPATIENT
Start: 2024-07-23

## 2024-07-23 RX ORDER — SILDENAFIL 100 MG/1
100 TABLET, FILM COATED ORAL DAILY PRN
Qty: 30 TABLET | Refills: 2 | Status: SHIPPED | OUTPATIENT
Start: 2024-07-23

## 2024-07-23 NOTE — TELEPHONE ENCOUNTER
Caller: Behzad Chi    Relationship: Self    Best call back number: [phone]     Requested Prescriptions:   Requested Prescriptions     Pending Prescriptions Disp Refills    sildenafil (Viagra) 100 MG tablet 30 tablet 2     Sig: Take 1 tablet by mouth Daily As Needed for Erectile Dysfunction.        Pharmacy where request should be sent: [facility] [id number] [address]  AT [facility] & [address] [place] [address] - [phone]  - [phone] FX     Last office visit with prescribing clinician: 6/6/2024   Last telemedicine visit with prescribing clinician: Visit date not found   Next office visit with prescribing clinician: 9/6/2024     Additional details provided by patient:     Does the patient have less than a 3 day supply:  [] Yes  [x] No    Would you like a call back once the refill request has been completed: [] Yes [x] No    If the office needs to give you a call back, can they leave a voicemail: [] Yes [x] No    Elayne Grey   07/23/24 13:52 EDT

## 2024-07-23 NOTE — TELEPHONE ENCOUNTER
Caller: Behzad Chi    Relationship: Self    Best call back number: [phone]     Requested Prescriptions:   Requested Prescriptions     Pending Prescriptions Disp Refills    amphetamine-dextroamphetamine XR (Adderall XR) 30 MG 24 hr capsule 30 capsule 0     Sig: Take 1 capsule by mouth Every Morning    amphetamine-dextroamphetamine (Adderall) 10 MG tablet 30 tablet 0     Sig: Take 1 pill orally every afternoon    Testosterone Cypionate (DEPOTESTOTERONE CYPIONATE) 200 MG/ML injection 2 mL 5     Sig: Inject 1 mL into the appropriate muscle as directed by prescriber Every 14 (Fourteen) Days.        Pharmacy where request should be sent: [place], [place] - [address] - [phone]  - [phone] FX     Last office visit with prescribing clinician: 6/6/2024   Last telemedicine visit with prescribing clinician: Visit date not found   Next office visit with prescribing clinician: 9/6/2024     Additional details provided by patient: WILL BE OUT OF MEDICATION TOMORROW.  DUE 7/25    Does the patient have less than a 3 day supply:  [x] Yes  [] No    Would you like a call back once the refill request has been completed: [] Yes [x] No    If the office needs to give you a call back, can they leave a voicemail: [] Yes [x] No    Elayne Grey   07/23/24 13:51 EDT

## 2024-08-22 DIAGNOSIS — N52.9 ERECTILE DYSFUNCTION, UNSPECIFIED ERECTILE DYSFUNCTION TYPE: ICD-10-CM

## 2024-08-22 DIAGNOSIS — F90.0 ATTENTION DEFICIT HYPERACTIVITY DISORDER (ADHD), PREDOMINANTLY INATTENTIVE TYPE: ICD-10-CM

## 2024-08-22 DIAGNOSIS — R79.89 LOW TESTOSTERONE IN MALE: ICD-10-CM

## 2024-08-22 RX ORDER — DEXTROAMPHETAMINE SACCHARATE, AMPHETAMINE ASPARTATE, DEXTROAMPHETAMINE SULFATE AND AMPHETAMINE SULFATE 2.5; 2.5; 2.5; 2.5 MG/1; MG/1; MG/1; MG/1
TABLET ORAL
Qty: 30 TABLET | Refills: 0 | Status: SHIPPED | OUTPATIENT
Start: 2024-08-22

## 2024-08-22 RX ORDER — SILDENAFIL 100 MG/1
100 TABLET, FILM COATED ORAL DAILY PRN
Qty: 30 TABLET | Refills: 2 | Status: SHIPPED | OUTPATIENT
Start: 2024-08-22

## 2024-08-22 RX ORDER — DEXTROAMPHETAMINE SACCHARATE, AMPHETAMINE ASPARTATE MONOHYDRATE, DEXTROAMPHETAMINE SULFATE AND AMPHETAMINE SULFATE 7.5; 7.5; 7.5; 7.5 MG/1; MG/1; MG/1; MG/1
30 CAPSULE, EXTENDED RELEASE ORAL EVERY MORNING
Qty: 30 CAPSULE | Refills: 0 | Status: SHIPPED | OUTPATIENT
Start: 2024-08-22

## 2024-08-22 RX ORDER — TESTOSTERONE CYPIONATE 200 MG/ML
200 INJECTION, SOLUTION INTRAMUSCULAR
Qty: 2 ML | Refills: 5 | Status: SHIPPED | OUTPATIENT
Start: 2024-08-22

## 2024-08-22 NOTE — TELEPHONE ENCOUNTER
Caller: Behzad Chi    Relationship: Self    Best call back number: [phone]     Requested Prescriptions:   Requested Prescriptions     Pending Prescriptions Disp Refills    sildenafil (Viagra) 100 MG tablet 30 tablet 2     Sig: Take 1 tablet by mouth Daily As Needed for Erectile Dysfunction.        Pharmacy where request should be sent: [facility] [id number] [address]  AT [facility] & [address] [place] [address] - [phone]  - [phone] FX     Last office visit with prescribing clinician: 6/6/2024   Last telemedicine visit with prescribing clinician: Visit date not found   Next office visit with prescribing clinician: 9/6/2024     Additional details provided by patient: PATIENT HAS A COUPLE OF PILLS LEFT    Does the patient have less than a 3 day supply:  [x] Yes  [] No    Would you like a call back once the refill request has been completed: [] Yes [x] No    If the office needs to give you a call back, can they leave a voicemail: [] Yes [x] No    Suze Urbina Rep   08/22/24 08:54 EDT

## 2024-08-22 NOTE — TELEPHONE ENCOUNTER
Caller: Behzad Chi    Relationship: Self    Best call back number: [phone]     Requested Prescriptions:   Requested Prescriptions     Pending Prescriptions Disp Refills    Testosterone Cypionate (DEPOTESTOTERONE CYPIONATE) 200 MG/ML injection 2 mL 5     Sig: Inject 1 mL into the appropriate muscle as directed by prescriber Every 14 (Fourteen) Days.    amphetamine-dextroamphetamine (Adderall) 10 MG tablet 30 tablet 0     Sig: Take 1 pill orally every afternoon    amphetamine-dextroamphetamine XR (Adderall XR) 30 MG 24 hr capsule 30 capsule 0     Sig: Take 1 capsule by mouth Every Morning        Pharmacy where request should be sent: [facility] [place], [place] - [address] - [phone]  - [phone] FX     Last office visit with prescribing clinician: 6/6/2024   Last telemedicine visit with prescribing clinician: Visit date not found   Next office visit with prescribing clinician: 9/6/2024     Additional details provided by patient: PATIENT IS COMPLETELY OUT OF ALL MEDICATIONS    Does the patient have less than a 3 day supply:  [x] Yes  [] No    Would you like a call back once the refill request has been completed: [] Yes [x] No    If the office needs to give you a call back, can they leave a voicemail: [] Yes [x] No    Suze Urbina   08/22/24 08:51 EDT

## 2024-09-11 DIAGNOSIS — R79.89 LOW TESTOSTERONE IN MALE: ICD-10-CM

## 2024-09-11 DIAGNOSIS — N52.9 ERECTILE DYSFUNCTION, UNSPECIFIED ERECTILE DYSFUNCTION TYPE: ICD-10-CM

## 2024-09-11 DIAGNOSIS — E55.9 VITAMIN D DEFICIENCY: ICD-10-CM

## 2024-09-11 DIAGNOSIS — F90.0 ATTENTION DEFICIT HYPERACTIVITY DISORDER (ADHD), PREDOMINANTLY INATTENTIVE TYPE: ICD-10-CM

## 2024-09-11 RX ORDER — ERGOCALCIFEROL 1.25 MG/1
50000 CAPSULE, LIQUID FILLED ORAL WEEKLY
Qty: 12 CAPSULE | Refills: 1 | Status: SHIPPED | OUTPATIENT
Start: 2024-09-11

## 2024-09-11 NOTE — TELEPHONE ENCOUNTER
Caller: Behzad Chi    Relationship: Self    Best call back number: [phone]     Requested Prescriptions:   Requested Prescriptions     Pending Prescriptions Disp Refills    amphetamine-dextroamphetamine XR (Adderall XR) 30 MG 24 hr capsule 30 capsule 0     Sig: Take 1 capsule by mouth Every Morning    amphetamine-dextroamphetamine (Adderall) 10 MG tablet 30 tablet 0     Sig: Take 1 pill orally every afternoon    vitamin D (ERGOCALCIFEROL) 1.25 MG (20491 UT) capsule capsule 12 capsule 1     Sig: Take 1 capsule by mouth 1 (One) Time Per Week.    Testosterone Cypionate (DEPOTESTOTERONE CYPIONATE) 200 MG/ML injection 2 mL 5     Sig: Inject 1 mL into the appropriate muscle as directed by prescriber Every 14 (Fourteen) Days.    Sublocade 300 MG/1.5ML solution prefilled syringe          Pharmacy where request should be sent: [facility] [id number]  AT [facility] [address] [place] [address] - [phone]  - [phone] FX     Last office visit with prescribing clinician: 6/6/2024   Last telemedicine visit with prescribing clinician: Visit date not found   Next office visit with prescribing clinician: 9/19/2024     Additional details provided by patient: THE VIAGRA MEDICATION GOES TO [facility] ONLY. OTHERS GO TO [facility]    Does the patient have less than a 3 day supply:  [x] Yes  [] No    Would you like a call back once the refill request has been completed: [] Yes [x] No    If the office needs to give you a call back, can they leave a voicemail: [] Yes [x] No    Suze Bell   09/11/24 10:47 EDT

## 2024-09-11 NOTE — TELEPHONE ENCOUNTER
Caller: Behzad Chi    Relationship: Self    Best call back number: [phone]     Requested Prescriptions:   Requested Prescriptions     Pending Prescriptions Disp Refills    amphetamine-dextroamphetamine XR (Adderall XR) 30 MG 24 hr capsule 30 capsule 0     Sig: Take 1 capsule by mouth Every Morning    amphetamine-dextroamphetamine (Adderall) 10 MG tablet 30 tablet 0     Sig: Take 1 pill orally every afternoon    vitamin D (ERGOCALCIFEROL) 1.25 MG (59937 UT) capsule capsule 12 capsule 1     Sig: Take 1 capsule by mouth 1 (One) Time Per Week.    Testosterone Cypionate (DEPOTESTOTERONE CYPIONATE) 200 MG/ML injection 2 mL 5     Sig: Inject 1 mL into the appropriate muscle as directed by prescriber Every 14 (Fourteen) Days.        Pharmacy where request should be sent: [facility] [address] - [phone]  - [phone] FX     Last office visit with prescribing clinician: 6/6/2024   Last telemedicine visit with prescribing clinician: Visit date not found   Next office visit with prescribing clinician: 9/19/2024     Additional details provided by patient: 1 DAY LEFT OF ALL MEDICATION     Does the patient have less than a 3 day supply:  [x] Yes  [] No    Would you like a call back once the refill request has been completed: [] Yes [x] No    If the office needs to give you a call back, can they leave a voicemail: [] Yes [x] No    Suze Bell Rep   09/11/24 10:46 EDT         DELETE AFTER READING TO PATIENT: “Thank you for sharing this information with me. I will send a message to the clinical team. Please allow 48 hours for the clinical staff to follow up on this request.”

## 2024-09-12 RX ORDER — TESTOSTERONE CYPIONATE 200 MG/ML
200 INJECTION, SOLUTION INTRAMUSCULAR
Qty: 2 ML | Refills: 5 | Status: SHIPPED | OUTPATIENT
Start: 2024-09-12

## 2024-09-12 RX ORDER — BUPRENORPHINE 300 MG/1
SOLUTION SUBCUTANEOUS
OUTPATIENT
Start: 2024-09-12

## 2024-09-12 RX ORDER — DEXTROAMPHETAMINE SACCHARATE, AMPHETAMINE ASPARTATE, DEXTROAMPHETAMINE SULFATE AND AMPHETAMINE SULFATE 2.5; 2.5; 2.5; 2.5 MG/1; MG/1; MG/1; MG/1
TABLET ORAL
Qty: 30 TABLET | Refills: 0 | Status: SHIPPED | OUTPATIENT
Start: 2024-09-12

## 2024-09-12 RX ORDER — DEXTROAMPHETAMINE SACCHARATE, AMPHETAMINE ASPARTATE MONOHYDRATE, DEXTROAMPHETAMINE SULFATE AND AMPHETAMINE SULFATE 7.5; 7.5; 7.5; 7.5 MG/1; MG/1; MG/1; MG/1
30 CAPSULE, EXTENDED RELEASE ORAL EVERY MORNING
Qty: 30 CAPSULE | Refills: 0 | Status: SHIPPED | OUTPATIENT
Start: 2024-09-12

## 2024-10-02 ENCOUNTER — APPOINTMENT (OUTPATIENT)
Dept: GENERAL RADIOLOGY | Facility: HOSPITAL | Age: 44
DRG: 391 | End: 2024-10-02
Payer: COMMERCIAL

## 2024-10-02 ENCOUNTER — APPOINTMENT (OUTPATIENT)
Dept: CT IMAGING | Facility: HOSPITAL | Age: 44
DRG: 391 | End: 2024-10-02
Payer: COMMERCIAL

## 2024-10-02 ENCOUNTER — APPOINTMENT (OUTPATIENT)
Dept: CARDIOLOGY | Facility: HOSPITAL | Age: 44
DRG: 391 | End: 2024-10-02
Payer: COMMERCIAL

## 2024-10-02 ENCOUNTER — HOSPITAL ENCOUNTER (INPATIENT)
Facility: HOSPITAL | Age: 44
LOS: 1 days | Discharge: HOME | DRG: 391 | End: 2024-10-03
Attending: EMERGENCY MEDICINE | Admitting: INTERNAL MEDICINE
Payer: COMMERCIAL

## 2024-10-02 DIAGNOSIS — R55 SYNCOPE, UNSPECIFIED SYNCOPE TYPE: ICD-10-CM

## 2024-10-02 DIAGNOSIS — R56.9 SEIZURE-LIKE ACTIVITY: ICD-10-CM

## 2024-10-02 DIAGNOSIS — I21.4 NSTEMI (NON-ST ELEVATED MYOCARDIAL INFARCTION): Primary | ICD-10-CM

## 2024-10-02 LAB
ALBUMIN SERPL-MCNC: 4.1 G/DL (ref 3.5–5.2)
ALBUMIN/GLOB SERPL: 1.7 G/DL
ALP SERPL-CCNC: 99 U/L (ref 39–117)
ALT SERPL W P-5'-P-CCNC: 56 U/L (ref 1–41)
ANION GAP SERPL CALCULATED.3IONS-SCNC: 8 MMOL/L (ref 5–15)
APTT PPP: 26.6 SECONDS (ref 60–90)
AST SERPL-CCNC: 30 U/L (ref 1–40)
BASOPHILS # BLD AUTO: 0.04 10*3/MM3 (ref 0–0.2)
BASOPHILS NFR BLD AUTO: 0.5 % (ref 0–1.5)
BILIRUB SERPL-MCNC: 0.4 MG/DL (ref 0–1.2)
BUN SERPL-MCNC: 16 MG/DL (ref 6–20)
BUN/CREAT SERPL: 11 (ref 7–25)
CALCIUM SPEC-SCNC: 9.3 MG/DL (ref 8.6–10.5)
CHLORIDE SERPL-SCNC: 101 MMOL/L (ref 98–107)
CO2 SERPL-SCNC: 29 MMOL/L (ref 22–29)
CREAT SERPL-MCNC: 1.46 MG/DL (ref 0.76–1.27)
DEPRECATED RDW RBC AUTO: 41.3 FL (ref 37–54)
EGFRCR SERPLBLD CKD-EPI 2021: 60.4 ML/MIN/1.73
EOSINOPHIL # BLD AUTO: 0.09 10*3/MM3 (ref 0–0.4)
EOSINOPHIL NFR BLD AUTO: 1.1 % (ref 0.3–6.2)
ERYTHROCYTE [DISTWIDTH] IN BLOOD BY AUTOMATED COUNT: 12.7 % (ref 12.3–15.4)
GEN 5 2HR TROPONIN T REFLEX: 36 NG/L
GLOBULIN UR ELPH-MCNC: 2.4 GM/DL
GLUCOSE SERPL-MCNC: 167 MG/DL (ref 65–99)
HCT VFR BLD AUTO: 49.7 % (ref 37.5–51)
HGB BLD-MCNC: 16.8 G/DL (ref 13–17.7)
HOLD SPECIMEN: NORMAL
IMM GRANULOCYTES # BLD AUTO: 0.04 10*3/MM3 (ref 0–0.05)
IMM GRANULOCYTES NFR BLD AUTO: 0.5 % (ref 0–0.5)
INR PPP: 0.92 (ref 0.89–1.12)
LYMPHOCYTES # BLD AUTO: 2.76 10*3/MM3 (ref 0.7–3.1)
LYMPHOCYTES NFR BLD AUTO: 34.8 % (ref 19.6–45.3)
MAGNESIUM SERPL-MCNC: 1.8 MG/DL (ref 1.6–2.6)
MCH RBC QN AUTO: 30.1 PG (ref 26.6–33)
MCHC RBC AUTO-ENTMCNC: 33.8 G/DL (ref 31.5–35.7)
MCV RBC AUTO: 88.9 FL (ref 79–97)
MONOCYTES # BLD AUTO: 0.58 10*3/MM3 (ref 0.1–0.9)
MONOCYTES NFR BLD AUTO: 7.3 % (ref 5–12)
NEUTROPHILS NFR BLD AUTO: 4.43 10*3/MM3 (ref 1.7–7)
NEUTROPHILS NFR BLD AUTO: 55.8 % (ref 42.7–76)
NRBC BLD AUTO-RTO: 0 /100 WBC (ref 0–0.2)
PLATELET # BLD AUTO: 209 10*3/MM3 (ref 140–450)
PMV BLD AUTO: 10.1 FL (ref 6–12)
POTASSIUM SERPL-SCNC: 4.1 MMOL/L (ref 3.5–5.2)
PROT SERPL-MCNC: 6.5 G/DL (ref 6–8.5)
PROTHROMBIN TIME: 12.5 SECONDS (ref 12.2–14.5)
QT INTERVAL: 322 MS
QT INTERVAL: 354 MS
QTC INTERVAL: 365 MS
QTC INTERVAL: 367 MS
RBC # BLD AUTO: 5.59 10*6/MM3 (ref 4.14–5.8)
SODIUM SERPL-SCNC: 138 MMOL/L (ref 136–145)
TROPONIN T DELTA: -26 NG/L
TROPONIN T SERPL HS-MCNC: 62 NG/L
TROPONIN T SERPL HS-MCNC: 8 NG/L
UFH PPP CHRO-ACNC: 0.1 IU/ML (ref 0.3–0.7)
WBC NRBC COR # BLD AUTO: 7.94 10*3/MM3 (ref 3.4–10.8)
WHOLE BLOOD HOLD COAG: NORMAL
WHOLE BLOOD HOLD SPECIMEN: NORMAL

## 2024-10-02 PROCEDURE — 84484 ASSAY OF TROPONIN QUANT: CPT | Performed by: EMERGENCY MEDICINE

## 2024-10-02 PROCEDURE — 25510000001 IOPAMIDOL PER 1 ML: Performed by: INTERNAL MEDICINE

## 2024-10-02 PROCEDURE — 84484 ASSAY OF TROPONIN QUANT: CPT

## 2024-10-02 PROCEDURE — 93005 ELECTROCARDIOGRAM TRACING: CPT | Performed by: EMERGENCY MEDICINE

## 2024-10-02 PROCEDURE — 83735 ASSAY OF MAGNESIUM: CPT | Performed by: EMERGENCY MEDICINE

## 2024-10-02 PROCEDURE — 25810000003 SODIUM CHLORIDE 0.9 % SOLUTION: Performed by: INTERNAL MEDICINE

## 2024-10-02 PROCEDURE — 93458 L HRT ARTERY/VENTRICLE ANGIO: CPT | Performed by: INTERNAL MEDICINE

## 2024-10-02 PROCEDURE — B2111ZZ FLUOROSCOPY OF MULTIPLE CORONARY ARTERIES USING LOW OSMOLAR CONTRAST: ICD-10-PCS | Performed by: INTERNAL MEDICINE

## 2024-10-02 PROCEDURE — 85520 HEPARIN ASSAY: CPT | Performed by: EMERGENCY MEDICINE

## 2024-10-02 PROCEDURE — 70450 CT HEAD/BRAIN W/O DYE: CPT

## 2024-10-02 PROCEDURE — 25010000002 FENTANYL CITRATE (PF) 50 MCG/ML SOLUTION: Performed by: INTERNAL MEDICINE

## 2024-10-02 PROCEDURE — B2151ZZ FLUOROSCOPY OF LEFT HEART USING LOW OSMOLAR CONTRAST: ICD-10-PCS | Performed by: INTERNAL MEDICINE

## 2024-10-02 PROCEDURE — C1769 GUIDE WIRE: HCPCS | Performed by: INTERNAL MEDICINE

## 2024-10-02 PROCEDURE — 93005 ELECTROCARDIOGRAM TRACING: CPT

## 2024-10-02 PROCEDURE — 93306 TTE W/DOPPLER COMPLETE: CPT | Performed by: INTERNAL MEDICINE

## 2024-10-02 PROCEDURE — C1894 INTRO/SHEATH, NON-LASER: HCPCS | Performed by: INTERNAL MEDICINE

## 2024-10-02 PROCEDURE — 25010000002 MAGNESIUM SULFATE 4 GM/100ML SOLUTION: Performed by: INTERNAL MEDICINE

## 2024-10-02 PROCEDURE — 99152 MOD SED SAME PHYS/QHP 5/>YRS: CPT | Performed by: INTERNAL MEDICINE

## 2024-10-02 PROCEDURE — 25010000002 HEPARIN (PORCINE) PER 1000 UNITS: Performed by: INTERNAL MEDICINE

## 2024-10-02 PROCEDURE — 4A023N7 MEASUREMENT OF CARDIAC SAMPLING AND PRESSURE, LEFT HEART, PERCUTANEOUS APPROACH: ICD-10-PCS | Performed by: INTERNAL MEDICINE

## 2024-10-02 PROCEDURE — 25010000002 NICARDIPINE 2.5 MG/ML SOLUTION: Performed by: INTERNAL MEDICINE

## 2024-10-02 PROCEDURE — 25810000003 SODIUM CHLORIDE 0.9 % SOLUTION: Performed by: EMERGENCY MEDICINE

## 2024-10-02 PROCEDURE — 71045 X-RAY EXAM CHEST 1 VIEW: CPT

## 2024-10-02 PROCEDURE — 85025 COMPLETE CBC W/AUTO DIFF WBC: CPT

## 2024-10-02 PROCEDURE — 84484 ASSAY OF TROPONIN QUANT: CPT | Performed by: INTERNAL MEDICINE

## 2024-10-02 PROCEDURE — 25010000002 LIDOCAINE PF 1% 1 % SOLUTION: Performed by: INTERNAL MEDICINE

## 2024-10-02 PROCEDURE — 25010000002 SODIUM CHLORIDE 0.9 % WITH KCL 20 MEQ 20-0.9 MEQ/L-% SOLUTION: Performed by: INTERNAL MEDICINE

## 2024-10-02 PROCEDURE — 25010000002 MIDAZOLAM PER 1 MG: Performed by: INTERNAL MEDICINE

## 2024-10-02 PROCEDURE — 80053 COMPREHEN METABOLIC PANEL: CPT | Performed by: EMERGENCY MEDICINE

## 2024-10-02 PROCEDURE — 85610 PROTHROMBIN TIME: CPT | Performed by: EMERGENCY MEDICINE

## 2024-10-02 PROCEDURE — 99291 CRITICAL CARE FIRST HOUR: CPT

## 2024-10-02 PROCEDURE — 25010000002 TESTOSTERONE CYPIONATE 200 MG/ML SOLUTION: Performed by: INTERNAL MEDICINE

## 2024-10-02 PROCEDURE — 93306 TTE W/DOPPLER COMPLETE: CPT

## 2024-10-02 PROCEDURE — 99223 1ST HOSP IP/OBS HIGH 75: CPT | Performed by: INTERNAL MEDICINE

## 2024-10-02 PROCEDURE — 36415 COLL VENOUS BLD VENIPUNCTURE: CPT

## 2024-10-02 PROCEDURE — 85730 THROMBOPLASTIN TIME PARTIAL: CPT | Performed by: EMERGENCY MEDICINE

## 2024-10-02 RX ORDER — HEPARIN SODIUM 1000 [USP'U]/ML
4000 INJECTION, SOLUTION INTRAVENOUS; SUBCUTANEOUS AS NEEDED
Status: DISCONTINUED | OUTPATIENT
Start: 2024-10-02 | End: 2024-10-02

## 2024-10-02 RX ORDER — HEPARIN SODIUM 1000 [USP'U]/ML
2000 INJECTION, SOLUTION INTRAVENOUS; SUBCUTANEOUS AS NEEDED
Status: DISCONTINUED | OUTPATIENT
Start: 2024-10-02 | End: 2024-10-02

## 2024-10-02 RX ORDER — SODIUM CHLORIDE 9 MG/ML
1 INJECTION, SOLUTION INTRAVENOUS CONTINUOUS
Status: ACTIVE | OUTPATIENT
Start: 2024-10-02 | End: 2024-10-03

## 2024-10-02 RX ORDER — PANTOPRAZOLE SODIUM 40 MG/1
40 TABLET, DELAYED RELEASE ORAL ONCE
Status: COMPLETED | OUTPATIENT
Start: 2024-10-02 | End: 2024-10-02

## 2024-10-02 RX ORDER — SODIUM CHLORIDE 0.9 % (FLUSH) 0.9 %
10 SYRINGE (ML) INJECTION AS NEEDED
Status: DISCONTINUED | OUTPATIENT
Start: 2024-10-02 | End: 2024-10-03 | Stop reason: HOSPADM

## 2024-10-02 RX ORDER — NITROGLYCERIN 0.4 MG/1
0.4 TABLET SUBLINGUAL
Status: DISCONTINUED | OUTPATIENT
Start: 2024-10-02 | End: 2024-10-03 | Stop reason: HOSPADM

## 2024-10-02 RX ORDER — HEPARIN SODIUM 1000 [USP'U]/ML
4000 INJECTION, SOLUTION INTRAVENOUS; SUBCUTANEOUS ONCE
Status: DISCONTINUED | OUTPATIENT
Start: 2024-10-02 | End: 2024-10-02

## 2024-10-02 RX ORDER — TESTOSTERONE CYPIONATE 200 MG/ML
200 INJECTION, SOLUTION INTRAMUSCULAR
Status: DISCONTINUED | OUTPATIENT
Start: 2024-10-02 | End: 2024-10-03 | Stop reason: HOSPADM

## 2024-10-02 RX ORDER — MAGNESIUM SULFATE HEPTAHYDRATE 40 MG/ML
4 INJECTION, SOLUTION INTRAVENOUS ONCE
Status: COMPLETED | OUTPATIENT
Start: 2024-10-02 | End: 2024-10-02

## 2024-10-02 RX ORDER — ASPIRIN 81 MG/1
324 TABLET, CHEWABLE ORAL ONCE
Status: COMPLETED | OUTPATIENT
Start: 2024-10-02 | End: 2024-10-02

## 2024-10-02 RX ORDER — SODIUM CHLORIDE 0.9 % (FLUSH) 0.9 %
10 SYRINGE (ML) INJECTION AS NEEDED
Status: DISCONTINUED | OUTPATIENT
Start: 2024-10-02 | End: 2024-10-02 | Stop reason: SDUPTHER

## 2024-10-02 RX ORDER — ATORVASTATIN CALCIUM 40 MG/1
80 TABLET, FILM COATED ORAL NIGHTLY
Status: DISCONTINUED | OUTPATIENT
Start: 2024-10-02 | End: 2024-10-03 | Stop reason: HOSPADM

## 2024-10-02 RX ORDER — METOPROLOL TARTRATE 25 MG/1
25 TABLET, FILM COATED ORAL EVERY 12 HOURS SCHEDULED
Status: DISCONTINUED | OUTPATIENT
Start: 2024-10-02 | End: 2024-10-03 | Stop reason: HOSPADM

## 2024-10-02 RX ORDER — FENTANYL CITRATE 50 UG/ML
INJECTION, SOLUTION INTRAMUSCULAR; INTRAVENOUS
Status: DISCONTINUED | OUTPATIENT
Start: 2024-10-02 | End: 2024-10-02 | Stop reason: HOSPADM

## 2024-10-02 RX ORDER — MIDAZOLAM HYDROCHLORIDE 1 MG/ML
INJECTION INTRAMUSCULAR; INTRAVENOUS
Status: DISCONTINUED | OUTPATIENT
Start: 2024-10-02 | End: 2024-10-02 | Stop reason: HOSPADM

## 2024-10-02 RX ORDER — ACETAMINOPHEN 325 MG/1
650 TABLET ORAL EVERY 4 HOURS PRN
Status: DISCONTINUED | OUTPATIENT
Start: 2024-10-02 | End: 2024-10-03 | Stop reason: HOSPADM

## 2024-10-02 RX ORDER — ASPIRIN 81 MG/1
81 TABLET, CHEWABLE ORAL DAILY
Status: DISCONTINUED | OUTPATIENT
Start: 2024-10-03 | End: 2024-10-03 | Stop reason: HOSPADM

## 2024-10-02 RX ORDER — HEPARIN SODIUM 10000 [USP'U]/100ML
12 INJECTION, SOLUTION INTRAVENOUS
Status: DISCONTINUED | OUTPATIENT
Start: 2024-10-02 | End: 2024-10-02

## 2024-10-02 RX ORDER — HEPARIN SODIUM 1000 [USP'U]/ML
INJECTION, SOLUTION INTRAVENOUS; SUBCUTANEOUS
Status: DISCONTINUED | OUTPATIENT
Start: 2024-10-02 | End: 2024-10-02 | Stop reason: HOSPADM

## 2024-10-02 RX ORDER — SODIUM CHLORIDE 9 MG/ML
40 INJECTION, SOLUTION INTRAVENOUS AS NEEDED
Status: DISCONTINUED | OUTPATIENT
Start: 2024-10-02 | End: 2024-10-03 | Stop reason: HOSPADM

## 2024-10-02 RX ORDER — SODIUM CHLORIDE 0.9 % (FLUSH) 0.9 %
10 SYRINGE (ML) INJECTION EVERY 12 HOURS SCHEDULED
Status: DISCONTINUED | OUTPATIENT
Start: 2024-10-02 | End: 2024-10-03 | Stop reason: HOSPADM

## 2024-10-02 RX ORDER — SODIUM CHLORIDE AND POTASSIUM CHLORIDE 150; 900 MG/100ML; MG/100ML
100 INJECTION, SOLUTION INTRAVENOUS CONTINUOUS
Status: DISCONTINUED | OUTPATIENT
Start: 2024-10-02 | End: 2024-10-03 | Stop reason: HOSPADM

## 2024-10-02 RX ORDER — HYDROCODONE BITARTRATE AND ACETAMINOPHEN 5; 325 MG/1; MG/1
1 TABLET ORAL EVERY 4 HOURS PRN
Status: DISCONTINUED | OUTPATIENT
Start: 2024-10-02 | End: 2024-10-03 | Stop reason: HOSPADM

## 2024-10-02 RX ORDER — LIDOCAINE HYDROCHLORIDE 10 MG/ML
INJECTION, SOLUTION EPIDURAL; INFILTRATION; INTRACAUDAL; PERINEURAL
Status: DISCONTINUED | OUTPATIENT
Start: 2024-10-02 | End: 2024-10-02 | Stop reason: HOSPADM

## 2024-10-02 RX ORDER — IOPAMIDOL 755 MG/ML
INJECTION, SOLUTION INTRAVASCULAR
Status: DISCONTINUED | OUTPATIENT
Start: 2024-10-02 | End: 2024-10-02 | Stop reason: HOSPADM

## 2024-10-02 RX ADMIN — SODIUM CHLORIDE 1000 ML: 9 INJECTION, SOLUTION INTRAVENOUS at 10:18

## 2024-10-02 RX ADMIN — MAGNESIUM SULFATE HEPTAHYDRATE 4 G: 40 INJECTION, SOLUTION INTRAVENOUS at 17:13

## 2024-10-02 RX ADMIN — POTASSIUM CHLORIDE AND SODIUM CHLORIDE 100 ML/HR: 900; 150 INJECTION, SOLUTION INTRAVENOUS at 16:19

## 2024-10-02 RX ADMIN — PANTOPRAZOLE SODIUM 40 MG: 40 TABLET, DELAYED RELEASE ORAL at 17:53

## 2024-10-02 RX ADMIN — ATORVASTATIN CALCIUM 80 MG: 40 TABLET, FILM COATED ORAL at 20:53

## 2024-10-02 RX ADMIN — METOPROLOL TARTRATE 25 MG: 25 TABLET, FILM COATED ORAL at 20:54

## 2024-10-02 RX ADMIN — TESTOSTERONE CYPIONATE 200 MG: 200 INJECTION INTRAMUSCULAR at 20:54

## 2024-10-02 RX ADMIN — ASPIRIN 81 MG 324 MG: 81 TABLET ORAL at 13:20

## 2024-10-02 NOTE — CASE MANAGEMENT/SOCIAL WORK
Discharge Planning Assessment  [facility]     Patient Name: Behzad Chi  MRN: [id number]  Today's Date: 10/2/2024    Admit Date: 10/2/2024    Plan: Home   Discharge Needs Assessment       Row Name 10/02/24 1316       Living Environment    People in Home parent(s)    Current Living Arrangements home    Primary Care Provided by self    Provides Primary Care For no one    Family Caregiver if Needed parent(s)    Family Caregiver Names Hossein Campbell, father    Quality of Family Relationships helpful;involved;supportive    Able to Return to Prior Arrangements yes       Transition Planning    Patient/Family Anticipates Transition to home with family    Patient/Family Anticipated Services at Transition        Discharge Needs Assessment    Equipment Currently Used at Home none    Concerns to be Addressed denies needs/concerns at this time    Discharge Coordination/Progress Lives in a house in [place] with father, independent with ADLs. No DME, history of home health or advanced directives.                   Discharge Plan       Row Name 10/02/24 1317       Plan    Plan Home    Patient/Family in Agreement with Plan yes    Plan Comments I spoke with Mr Chi at bedside.  Mr Chi resides in a house in [place] with his father and is independent with ADLs. He does not use any DME, has not had home health in the past, and does not have an advanced directive.  His insurance is [employer], and there have not been any issues or lapses in coverage.  His insurance does help cover prescriptions.  His PCP is Cristiana Jackson, and he gets his prescriptions at [facility] off [address].  At this time he denies any discharge needs.    Final Discharge Disposition Code 01 - home or self-care                  Continued Care and Services - Admitted Since 10/2/2024    No active coordination exists for this encounter.          Demographic Summary    No documentation.                  Functional Status        Row Name 10/02/24 1315       Functional Status    Usual Activity Tolerance good    Current Activity Tolerance good       Functional Status, IADL    Medications independent    Meal Preparation independent    Housekeeping independent    Laundry independent    Shopping independent       Mental Status    General Appearance WDL WDL                   Psychosocial    No documentation.                  Abuse/Neglect    No documentation.                  Legal    No documentation.                  Substance Abuse    No documentation.                  Patient Forms    No documentation.                     Aleah Smallwood RN

## 2024-10-02 NOTE — Clinical Note
Hemostasis started on the right radial artery. R-Band was used in achieving hemostasis. Radial compression device applied to vessel. Hemostasis achieved successfully. Closure device additional comment: 11 ML

## 2024-10-02 NOTE — H&P
Referring Provider: Hospitalist  Reason for Consultation: Chest pain    Patient Care Team:  Cristiana Jackson DO as PCP - General (Family Medicine)    Chief complaint: chest  pain    Subjective .     History of present illness:  44-year-old male presents here today for chest pain.  Patient episode where he was bending over on side of bed.  Patient's chest started hurting him and he passed out.  Patient came to and states he felt like his heart was beating hard.  EMS was called.  Patient does not recall the event.  Patient has history of such issues.  Patient was diaphoretic and nauseous.  Patient denies any current chest pain pressure discomfort.  EKG personally reviewed as it was telemetry.  Telemetry shows sinus rhythm EKG is show nonspecific T wave changes.  Cardiologist consulted for further evaluation given troponin elevation.    Review of Systems   Pertinent items are noted in HPI    History  Past Medical History:   Diagnosis Date    ADHD     Depression     Erectile dysfunction 01/2022    PTSD (post-traumatic stress disorder)    ,   Past Surgical History:   Procedure Laterality Date    APPENDECTOMY      CIRCUMCISION      COLONOSCOPY      Need to schedule one   ,   Family History   Problem Relation Age of Onset    Diabetes Mother     Cancer Father     Other Father     Cancer Paternal Grandfather    ,   Social History     Socioeconomic History    Marital status: Legally    Tobacco Use    Smoking status: Every Day     Current packs/day: 0.50     Average packs/day: 0.5 packs/day for 21.0 years (10.5 ttl pk-yrs)     Types: Cigarettes, Electronic Cigarette     Passive exposure: Current    Smokeless tobacco: Never    Tobacco comments:     Quit for 7 years and started back 6 months ago   Vaping Use    Vaping status: Every Day    Substances: Nicotine, Flavoring    Devices: Disposable, Pre-filled pod    Passive vaping exposure: Yes   Substance and Sexual Activity    Alcohol use: Not Currently    Drug use: Not  Currently    Sexual activity: Yes     Partners: Female     Birth control/protection: None     E-cigarette/Vaping    E-cigarette/Vaping Use Current Every Day User     Passive Exposure Yes     Counseling Given No      E-cigarette/Vaping Substances    Nicotine Yes     THC No     CBD No     Flavoring Yes      E-cigarette/Vaping Devices    Disposable Yes     Pre-filled or Refillable Cartridge No     Refillable Tank No     Pre-filled Pod Yes          , (Not in a hospital admission) , Scheduled Meds:  aspirin, 324 mg, Oral, Once  heparin (porcine), 4,000 Units, Intravenous, Once   , Continuous Infusions:  heparin, 1,000 Units/hr  Pharmacy to Dose Heparin,    , PRN Meds:    Pharmacy to Dose Heparin    sodium chloride, and Allergies:  Patient has no known allergies.    Objective     Vital Signs  Temp:  [97.7 °F (36.5 °C)] 97.7 °F (36.5 °C)  Heart Rate:  [61-92] 80  Resp:  [18] 18  BP: (120-133)/(78-87) 120/78  Body mass index is 30.79 kg/m².  No intake or output data in the 24 hours ending 10/02/24 1226  No intake/output data recorded.    Physical Exam:      General Appearance:  Alert, cooperative, in no acute distress   Head:  Normocephalic, without obvious abnormality, atraumatic   Eyes:  Lids and lashes normal, conjunctivae and sclerae normal, no icterus, no pallor, corneas clear, PERRLA   Ears:  Ears appear intact with no abnormalities noted   Throat:  No oral lesions, no thrush, oral mucosa moist   Neck:  No adenopathy, supple, trachea midline, no thyromegaly, no carotid bruit, no JVD   Back:  No kyphosis present, no scoliosis present, no skin lesions, erythema or scars, no tenderness to percussion or palpation, range of motion normal   Lungs:  Clear to auscultation, respirations regular, even and unlabored    Heart:  Regular rhythm and normal rate, normal S1 and S2, no murmur, no gallop, no rub, no click   Chest Wall:  No abnormalities observed   Abdomen:  Normal bowel sounds, no masses, no organomegaly, soft  "non-tender, non-distended, no guarding, no rebound tenderness   Rectal:  Deferred   Extremities:  Moves all extremities well, no edema, no cyanosis, no redness   Pulses:  Pulses palpable and equal bilaterally   Skin:  No bleeding, bruising or rash   Lymph nodes:  No palpable adenopathy   Neurologic:  Cranial nerves 2 - 12 grossly intact, sensation intact, DTR present and equal bilaterally                                                                               Results Review:    I reviewed the patient's new clinical results.       WBC WBC   Date Value Ref Range Status   10/02/2024 7.94 3.40 - 10.80 10*3/mm3 Final      HGB Hemoglobin   Date Value Ref Range Status   10/02/2024 16.8 13.0 - 17.7 g/dL Final      HCT Hematocrit   Date Value Ref Range Status   10/02/2024 49.7 37.5 - 51.0 % Final      Platlets No results found for: \"LABPLAT\"     PT/INR:      Protime   Date Value Ref Range Status   10/02/2024 12.5 12.2 - 14.5 Seconds Final   /  INR   Date Value Ref Range Status   10/02/2024 0.92 0.89 - 1.12 Final       Sodium Sodium   Date Value Ref Range Status   10/02/2024 138 136 - 145 mmol/L Final      Potassium Potassium   Date Value Ref Range Status   10/02/2024 4.1 3.5 - 5.2 mmol/L Final     Comment:     Slight hemolysis detected by analyzer. Result may be falsely elevated.      Chloride Chloride   Date Value Ref Range Status   10/02/2024 101 98 - 107 mmol/L Final      Bicarbonate No results found for: \"PLASMABICARB\"   BUN BUN   Date Value Ref Range Status   10/02/2024 16 6 - 20 mg/dL Final      Creatinine Creatinine   Date Value Ref Range Status   10/02/2024 1.46 (H) 0.76 - 1.27 mg/dL Final      Calcium Calcium   Date Value Ref Range Status   10/02/2024 9.3 8.6 - 10.5 mg/dL Final      Magnesium Magnesium   Date Value Ref Range Status   10/02/2024 1.8 1.6 - 2.6 mg/dL Final        pH No results found for: \"PHART\"   pO2 No results found for: \"PO2ART\"   pCO2 No results found for: \"DRP1QGT\"   HCO3 No results found " "for: \"TFG9SIK\"     Assessment & Plan     Patient Active Problem List   Diagnosis Code    Encounter for annual physical exam Z00.00    Erectile dysfunction N52.9    Attention deficit hyperactivity disorder (ADHD), predominantly inattentive type F90.0    Vitamin D deficiency E55.9    Low testosterone in male R79.89       Non-ST elevation myocardial infarction  Nicotine abuse  Family history of premature coronary artery disease  Syncope    Risk and  benefits of left heart catheterization discussed with patient.  Will proceed with such today.  Will do via radial approach.  Patient also undergo echocardiogram for further evaluation.    I discussed the patients findings and my recommendations with patient.     Rakesh Alvarez MD  10/02/24  12:26 EDT        "

## 2024-10-02 NOTE — PAYOR COMM NOTE
"Behzad Chi (44 y.o. Male)     Sandy David, ULISES  Utilization Review  Enzos-[phone]  [phone]        Date of Birth   1980    Social Security Number       Address   [address] [place] [address]    Home Phone   [phone]    MRN   [id number]       Congregation   [employer]    Marital Status   Legally                             Admission Date   10/2/24    Admission Type   Emergency    Admitting Provider   Rakesh Alvarez MD    Attending Provider   Rakesh Alvarez MD    Department, Room/Bed   [facility] 6A, N601/1       Discharge Date       Discharge Disposition       Discharge Destination                                 Attending Provider: Rakesh Alvarez MD    Allergies: No Known Allergies    Isolation: None   Infection: None   Code Status: CPR    Ht: 165.1 cm (65\")   Wt: 83.9 kg (185 lb)    Admission Cmt: None   Principal Problem: NSTEMI (non-ST elevated myocardial infarction) [I21.4]                   Active Insurance as of 10/2/2024       Primary Coverage       Payor Plan Insurance Group Employer/Plan Group    [employer] [place] [employer] [place]ICA[place]        Payor Plan Address Payor Plan Phone Number Payor Plan Fax Number Effective Dates    [address] [address] [phone]  8/22/2023 - None Entered    [facility] [id number]         Subscriber Name Subscriber Birth Date Member ID       BEHZAD CHI 1980 [id number]                     Emergency Contacts        (Rel.) Home Phone Work Phone Mobile Phone    JOSH MCCARTY (Father) -- -- [phone]                 History & Physical        Rakesh Alvarez MD at 10/02/24 1226            Referring Provider: Hospitalist  Reason for Consultation: Chest pain    Patient Care Team:  Cristiana Jackson DO as PCP - General (Family Medicine)    Chief complaint: chest  pain    Subjective.     History of present illness:  44-year-old male presents here today for chest pain.  Patient episode " where he was bending over on side of bed.  Patient's chest started hurting him and he passed out.  Patient came to and states he felt like his heart was beating hard.  EMS was called.  Patient does not recall the event.  Patient has history of such issues.  Patient was diaphoretic and nauseous.  Patient denies any current chest pain pressure discomfort.  EKG personally reviewed as it was telemetry.  Telemetry shows sinus rhythm EKG is show nonspecific T wave changes.  Cardiologist consulted for further evaluation given troponin elevation.    Review of Systems   Pertinent items are noted in HPI    History  Past Medical History:   Diagnosis Date    ADHD     Depression     Erectile dysfunction 01/2022    PTSD (post-traumatic stress disorder)    ,   Past Surgical History:   Procedure Laterality Date    APPENDECTOMY      CIRCUMCISION      COLONOSCOPY      Need to schedule one   ,   Family History   Problem Relation Age of Onset    Diabetes Mother     Cancer Father     Other Father     Cancer Paternal Grandfather    ,   Social History     Socioeconomic History    Marital status: Legally    Tobacco Use    Smoking status: Every Day     Current packs/day: 0.50     Average packs/day: 0.5 packs/day for 21.0 years (10.5 ttl pk-yrs)     Types: Cigarettes, Electronic Cigarette     Passive exposure: Current    Smokeless tobacco: Never    Tobacco comments:     Quit for 7 years and started back 6 months ago   Vaping Use    Vaping status: Every Day    Substances: Nicotine, Flavoring    Devices: Disposable, Pre-filled pod    Passive vaping exposure: Yes   Substance and Sexual Activity    Alcohol use: Not Currently    Drug use: Not Currently    Sexual activity: Yes     Partners: Female     Birth control/protection: None     E-cigarette/Vaping    E-cigarette/Vaping Use Current Every Day User     Passive Exposure Yes     Counseling Given No      E-cigarette/Vaping Substances    Nicotine Yes     THC No     CBD No     Flavoring  Yes      E-cigarette/Vaping Devices    Disposable Yes     Pre-filled or Refillable Cartridge No     Refillable Tank No     Pre-filled Pod Yes          , (Not in a hospital admission) , Scheduled Meds:  aspirin, 324 mg, Oral, Once  heparin (porcine), 4,000 Units, Intravenous, Once   , Continuous Infusions:  heparin, 1,000 Units/hr  Pharmacy to Dose Heparin,    , PRN Meds:    Pharmacy to Dose Heparin    sodium chloride, and Allergies:  Patient has no known allergies.    Objective    Vital Signs  Temp:  [97.7 °F (36.5 °C)] 97.7 °F (36.5 °C)  Heart Rate:  [61-92] 80  Resp:  [18] 18  BP: (120-133)/(78-87) 120/78  Body mass index is 30.79 kg/m².  No intake or output data in the 24 hours ending 10/02/24 1226  No intake/output data recorded.    Physical Exam:      General Appearance:  Alert, cooperative, in no acute distress   Head:  Normocephalic, without obvious abnormality, atraumatic   Eyes:  Lids and lashes normal, conjunctivae and sclerae normal, no icterus, no pallor, corneas clear, PERRLA   Ears:  Ears appear intact with no abnormalities noted   Throat:  No oral lesions, no thrush, oral mucosa moist   Neck:  No adenopathy, supple, trachea midline, no thyromegaly, no carotid bruit, no JVD   Back:  No kyphosis present, no scoliosis present, no skin lesions, erythema or scars, no tenderness to percussion or palpation, range of motion normal   Lungs:  Clear to auscultation, respirations regular, even and unlabored    Heart:  Regular rhythm and normal rate, normal S1 and S2, no murmur, no gallop, no rub, no click   Chest Wall:  No abnormalities observed   Abdomen:  Normal bowel sounds, no masses, no organomegaly, soft non-tender, non-distended, no guarding, no rebound tenderness   Rectal:  Deferred   Extremities:  Moves all extremities well, no edema, no cyanosis, no redness   Pulses:  Pulses palpable and equal bilaterally   Skin:  No bleeding, bruising or rash   Lymph nodes:  No palpable adenopathy   Neurologic:   "Cranial nerves 2 - 12 grossly intact, sensation intact, DTR present and equal bilaterally                                                                               Results Review:    I reviewed the patient's new clinical results.       WBC WBC   Date Value Ref Range Status   10/02/2024 7.94 3.40 - 10.80 10*3/mm3 Final      HGB Hemoglobin   Date Value Ref Range Status   10/02/2024 16.8 13.0 - 17.7 g/dL Final      HCT Hematocrit   Date Value Ref Range Status   10/02/2024 49.7 37.5 - 51.0 % Final      Platlets No results found for: \"LABPLAT\"     PT/INR:      Protime   Date Value Ref Range Status   10/02/2024 12.5 12.2 - 14.5 Seconds Final   /  INR   Date Value Ref Range Status   10/02/2024 0.92 0.89 - 1.12 Final       Sodium Sodium   Date Value Ref Range Status   10/02/2024 138 136 - 145 mmol/L Final      Potassium Potassium   Date Value Ref Range Status   10/02/2024 4.1 3.5 - 5.2 mmol/L Final     Comment:     Slight hemolysis detected by analyzer. Result may be falsely elevated.      Chloride Chloride   Date Value Ref Range Status   10/02/2024 101 98 - 107 mmol/L Final      Bicarbonate No results found for: \"PLASMABICARB\"   BUN BUN   Date Value Ref Range Status   10/02/2024 16 6 - 20 mg/dL Final      Creatinine Creatinine   Date Value Ref Range Status   10/02/2024 1.46 (H) 0.76 - 1.27 mg/dL Final      Calcium Calcium   Date Value Ref Range Status   10/02/2024 9.3 8.6 - 10.5 mg/dL Final      Magnesium Magnesium   Date Value Ref Range Status   10/02/2024 1.8 1.6 - 2.6 mg/dL Final        pH No results found for: \"PHART\"   pO2 No results found for: \"PO2ART\"   pCO2 No results found for: \"RFI4OUB\"   HCO3 No results found for: \"AYC2NSJ\"     Assessment & Plan    Patient Active Problem List   Diagnosis Code    Encounter for annual physical exam Z00.00    Erectile dysfunction N52.9    Attention deficit hyperactivity disorder (ADHD), predominantly inattentive type F90.0    Vitamin D deficiency E55.9    Low testosterone in " male R79.89       Non-ST elevation myocardial infarction  Nicotine abuse  Family history of premature coronary artery disease  Syncope    Risk and  benefits of left heart catheterization discussed with patient.  Will proceed with such today.  Will do via radial approach.  Patient also undergo echocardiogram for further evaluation.    I discussed the patients findings and my recommendations with patient.     Rakesh Alvarez MD  10/02/24  12:26 EDT          Electronically signed by Rakesh Alvarez MD at 10/02/24 1228          Emergency Department Notes        Cristiana Donovan RN at 10/02/24 1243           Behzad Chi    Nursing Report ED to Floor:  Mental status: A&OX4  Ambulatory status: INDEPENDENT   Oxygen Therapy:  RA  Cardiac Rhythm: NSTEMI  Admitted from: ED  Safety Concerns:  N/A  Social Issues: N/A  ED Room #:  22    ED Nurse Phone Extension - [phone] or may call [phone].      HPI:   Chief Complaint   Patient presents with    Syncope       Past Medical History:  Past Medical History:   Diagnosis Date    ADHD     Depression     Erectile dysfunction 01/2022    PTSD (post-traumatic stress disorder)         Past Surgical History:  Past Surgical History:   Procedure Laterality Date    APPENDECTOMY      CIRCUMCISION      COLONOSCOPY      Need to schedule one        Admitting Doctor:   No admitting provider for patient encounter.    Consulting Provider(s):  Consults       No orders found from 9/3/2024 to 10/3/2024.             Admitting Diagnosis:   There were no encounter diagnoses.    Most Recent Vitals:   Vitals:    10/02/24 1118 10/02/24 1128 10/02/24 1200 10/02/24 1232   BP: 126/85 133/84 120/78 126/82   BP Location:       Patient Position:       Pulse: 92 84 80 86   Resp:       Temp:       TempSrc:       SpO2: 99% 97% 96% 97%   Weight:       Height:           Active LDAs/IV Access:   Lines, Drains & Airways       Active LDAs       Name Placement date Placement time Site Days    Peripheral IV 10/02/24  0901 Left Antecubital 10/02/24  0901  Antecubital  less than 1                    Labs (abnormal labs have a star):   Labs Reviewed   COMPREHENSIVE METABOLIC PANEL - Abnormal; Notable for the following components:       Result Value    Glucose 167 (*)     Creatinine 1.46 (*)     ALT (SGPT) 56 (*)     All other components within normal limits    Narrative:     GFR Normal >60  Chronic Kidney Disease <60  Kidney Failure <15     SINGLE HS TROPONIN T - Abnormal; Notable for the following components:    HS Troponin T 62 (*)     All other components within normal limits    Narrative:     High Sensitive Troponin T Reference Range:  <14.0 ng/L- Negative Female for AMI  <22.0 ng/L- Negative Male for AMI  >=14 - Abnormal Female indicating possible myocardial injury.  >=22 - Abnormal Male indicating possible myocardial injury.   Clinicians would have to utilize clinical acumen, EKG, Troponin, and serial changes to determine if it is an Acute Myocardial Infarction or myocardial injury due to an underlying chronic condition.        APTT - Abnormal; Notable for the following components:    PTT 26.6 (*)     All other components within normal limits    Narrative:     PTT = The equivalent PTT values for the therapeutic range of heparin levels at 0.3 to 0.5 U/ml are 60 to 70 seconds.   MAGNESIUM - Normal   SINGLE HS TROPONIN T - Normal    Narrative:     High Sensitive Troponin T Reference Range:  <14.0 ng/L- Negative Female for AMI  <22.0 ng/L- Negative Male for AMI  >=14 - Abnormal Female indicating possible myocardial injury.  >=22 - Abnormal Male indicating possible myocardial injury.   Clinicians would have to utilize clinical acumen, EKG, Troponin, and serial changes to determine if it is an Acute Myocardial Infarction or myocardial injury due to an underlying chronic condition.        CBC WITH AUTO DIFFERENTIAL - Normal   PROTIME-INR - Normal   RAINBOW DRAW    Narrative:     The following orders were created for panel order  [place] Draw.  Procedure                               Abnormality         Status                     ---------                               -----------         ------                     Green Top (Gel)[[id number]]                                  Final result               Lavender Top[[id number]]                                     Final result               Gold Top - SST[[id number]]                                   Final result               Gonzalez Top[[id number]]                                         Final result               Light Blue Top[[id number]]                                   Final result                 Please view results for these tests on the individual orders.   HIGH SENSITIVITIY TROPONIN T 2HR   HEPARIN ANTI XA   CBC AND DIFFERENTIAL    Narrative:     The following orders were created for panel order CBC & Differential.  Procedure                               Abnormality         Status                     ---------                               -----------         ------                     CBC Auto Differential[[id number]]        Normal              Final result                 Please view results for these tests on the individual orders.   GREEN TOP   LAVENDER TOP   GOLD TOP - SST   GRAY TOP   LIGHT BLUE TOP       Meds Given in ED:   Medications   sodium chloride 0.9 % flush 10 mL (has no administration in time range)   heparin (porcine) injection 4,000 Units (has no administration in time range)   heparin 13808 units/250 mL (100 units/mL) in 0.45 % NaCl infusion (has no administration in time range)   Pharmacy to Dose Heparin (has no administration in time range)   aspirin chewable tablet 324 mg (has no administration in time range)   sodium chloride 0.9 % bolus 1,000 mL (1,000 mL Intravenous New Bag 10/2/24 1018)     heparin, 1,000 Units/hr  Pharmacy to Dose Heparin,          Last NIH score:                                                          Dysphagia screening results:  Patient Factors  Component (Dysphagia:Stroke or Rule-out)  Best Eye Response: 4-->(E4) spontaneous (10/02/24 0910)  Best Motor Response: 6-->(M6) obeys commands (10/02/24 0910)  Best Verbal Response: 5-->(V5) oriented (10/02/24 0910)  [place] Coma Scale Score: 15 (10/02/24 0910)     [place] Coma Scale:  No data recorded     CIWA:        Restraint Type:            Isolation Status:  No active isolations          Electronically signed by Cristiana Donovan RN at 10/02/24 1243       Vital Signs (last day)       Date/Time Temp Temp src Pulse Resp BP Patient Position SpO2    10/02/24 14:37:08 -- -- 77 15 131/91 -- 98    10/02/24 14:13:03 -- -- 82 15 150/91 -- 99    10/02/24 1322 -- -- 80 -- -- -- 99    10/02/24 1300 -- -- 74 -- 127/79 -- 99    10/02/24 1255 -- -- 79 -- 136/91 -- --    10/02/24 1254 -- -- 74 -- 136/91 -- 98    10/02/24 1232 -- -- 86 -- 126/82 -- 97    10/02/24 1200 -- -- 80 -- 120/78 -- 96    10/02/24 1128 -- -- 84 -- 133/84 -- 97    10/02/24 1118 -- -- 92 -- 126/85 -- 99    10/02/24 1030 -- -- 79 -- 129/87 -- 98    10/02/24 1001 -- -- 81 -- 127/86 -- 96    10/02/24 0932 -- -- 75 -- 125/82 -- 95    10/02/24 0906 97.7 (36.5) Oral -- -- -- -- --    10/02/24 0902 -- -- 61 18 129/85 Sitting 95          Oxygen Therapy (last day)       Date/Time SpO2 Device (Oxygen Therapy) Flow (L/min) Oxygen Concentration (%) ETCO2 (mmHg)    10/02/24 14:37:08 98 -- -- -- 50    10/02/24 14:32:53 -- -- -- -- 51    10/02/24 14:13:03 99 -- -- -- 46    10/02/24 1322 99 -- -- -- --    10/02/24 1300 99 -- -- -- --    10/02/24 1254 98 -- -- -- --    10/02/24 1232 97 -- -- -- --    10/02/24 1200 96 -- -- -- --    10/02/24 1128 97 -- -- -- --    10/02/24 1118 99 -- -- -- --    10/02/24 1030 98 -- -- -- --    10/02/24 1001 96 -- -- -- --    10/02/24 0932 95 -- -- -- --    10/02/24 0902 95 room air -- -- --          Lines, Drains & Airways       Active LDAs       Name Placement date Placement time Site Days    Peripheral IV 10/02/24 0901 Left Antecubital  10/02/24  0901  Antecubital  less than 1                  Current Facility-Administered Medications   Medication Dose Route Frequency Provider Last Rate Last Admin    acetaminophen (TYLENOL) tablet 650 mg  650 mg Oral Q4H PRN Rakesh Alvarez MD        [START ON 10/3/2024] aspirin chewable tablet 81 mg  81 mg Oral Daily Rakesh Alvarez MD        atorvastatin (LIPITOR) tablet 80 mg  80 mg Oral Nightly Rakesh Alvarez MD        HYDROcodone-acetaminophen (NORCO) 5-325 MG per tablet 1 tablet  1 tablet Oral Q4H PRN Rakesh Alvarez MD        metoprolol tartrate (LOPRESSOR) tablet 25 mg  25 mg Oral Q12H Rakesh Alvarez MD        nitroglycerin (NITROSTAT) SL tablet 0.4 mg  0.4 mg Sublingual Q5 Min PRN Rakesh Alvarez MD        sodium chloride 0.9 % flush 10 mL  10 mL Intravenous PRN Rakesh Alvarez MD        sodium chloride 0.9 % infusion  1 mL/kg/hr Intravenous Continuous Rakesh Alvarez MD         Lab Results (last 24 hours)       Procedure Component Value Units Date/Time    Heparin Anti-Xa [[id number]]  (Abnormal) Collected: 10/02/24 1226    Specimen: Blood Updated: 10/02/24 1300     Heparin Anti-Xa (UFH) 0.10 IU/ml     Protime-INR [[id number]]  (Normal) Collected: 10/02/24 0911    Specimen: Blood Updated: 10/02/24 1218     Protime 12.5 Seconds      INR 0.92    aPTT [[id number]]  (Abnormal) Collected: 10/02/24 0911    Specimen: Blood Updated: 10/02/24 1218     PTT 26.6 seconds     Narrative:      PTT = The equivalent PTT values for the therapeutic range of heparin levels at 0.3 to 0.5 U/ml are 60 to 70 seconds.    Single High Sensitivity Troponin T [[id number]]  (Abnormal) Collected: 10/02/24 1127    Specimen: Blood Updated: 10/02/24 1201     HS Troponin T 62 ng/L     Narrative:      High Sensitive Troponin T Reference Range:  <14.0 ng/L- Negative Female for AMI  <22.0 ng/L- Negative Male for AMI  >=14 - Abnormal Female indicating possible  myocardial injury.  >=22 - Abnormal Male indicating possible myocardial injury.   Clinicians would have to utilize clinical acumen, EKG, Troponin, and serial changes to determine if it is an Acute Myocardial Infarction or myocardial injury due to an underlying chronic condition.         Comprehensive Metabolic Panel [[id number]]  (Abnormal) Collected: 10/02/24 0911    Specimen: Blood Updated: 10/02/24 0953     Glucose 167 mg/dL      BUN 16 mg/dL      Creatinine 1.46 mg/dL      Sodium 138 mmol/L      Potassium 4.1 mmol/L      Comment: Slight hemolysis detected by analyzer. Result may be falsely elevated.        Chloride 101 mmol/L      CO2 29.0 mmol/L      Calcium 9.3 mg/dL      Total Protein 6.5 g/dL      Albumin 4.1 g/dL      ALT (SGPT) 56 U/L      AST (SGOT) 30 U/L      Alkaline Phosphatase 99 U/L      Total Bilirubin 0.4 mg/dL      Globulin 2.4 gm/dL      Comment: Calculated Result        A/G Ratio 1.7 g/dL      BUN/Creatinine Ratio 11.0     Anion Gap 8.0 mmol/L      eGFR 60.4 mL/min/1.73     Narrative:      GFR Normal >60  Chronic Kidney Disease <60  Kidney Failure <15      Magnesium [[id number]]  (Normal) Collected: 10/02/24 0911    Specimen: Blood Updated: 10/02/24 0953     Magnesium 1.8 mg/dL     Single High Sensitivity Troponin T [[id number]]  (Normal) Collected: 10/02/24 0911    Specimen: Blood Updated: 10/02/24 0942     HS Troponin T 8 ng/L     Narrative:      High Sensitive Troponin T Reference Range:  <14.0 ng/L- Negative Female for AMI  <22.0 ng/L- Negative Male for AMI  >=14 - Abnormal Female indicating possible myocardial injury.  >=22 - Abnormal Male indicating possible myocardial injury.   Clinicians would have to utilize clinical acumen, EKG, Troponin, and serial changes to determine if it is an Acute Myocardial Infarction or myocardial injury due to an underlying chronic condition.         [place] Draw [[id number]] Collected: 10/02/24 0911    Specimen: Blood Updated: 10/02/24 0930    Narrative:       The following orders were created for panel order [place] Draw.  Procedure                               Abnormality         Status                     ---------                               -----------         ------                     Green Top (Gel)[[id number]]                                  Final result               Lavender Top[[id number]]                                     Final result               Gold Top - SST[[id number]]                                   Final result               Gonzalez Top[[id number]]                                         Final result               Light Blue Top[[id number]]                                   Final result                 Please view results for these tests on the individual orders.    Green Top (Gel) [[id number]] Collected: 10/02/24 0911    Specimen: Blood Updated: 10/02/24 0930     Extra Tube Hold for add-ons.     Comment: Auto resulted.       Lavender Top [[id number]] Collected: 10/02/24 0911    Specimen: Blood Updated: 10/02/24 0930     Extra Tube hold for add-on     Comment: Auto resulted       Gold Top - SST [[id number]] Collected: 10/02/24 0911    Specimen: Blood Updated: 10/02/24 0930     Extra Tube Hold for add-ons.     Comment: Auto resulted.       Gonzalez Top [[id number]] Collected: 10/02/24 0911    Specimen: Blood Updated: 10/02/24 0930     Extra Tube Hold for add-ons.     Comment: Auto resulted.       Light Blue Top [[id number]] Collected: 10/02/24 0911    Specimen: Blood Updated: 10/02/24 0930     Extra Tube Hold for add-ons.     Comment: Auto resulted       CBC & Differential [[id number]]  (Normal) Collected: 10/02/24 0911    Specimen: Blood Updated: 10/02/24 0921    Narrative:      The following orders were created for panel order CBC & Differential.  Procedure                               Abnormality         Status                     ---------                               -----------         ------                     CBC Auto Differential[[id number]]         Normal              Final result                 Please view results for these tests on the individual orders.    CBC Auto Differential [[id number]]  (Normal) Collected: 10/02/24 0911    Specimen: Blood Updated: 10/02/24 0921     WBC 7.94 10*3/mm3      RBC 5.59 10*6/mm3      Hemoglobin 16.8 g/dL      Hematocrit 49.7 %      MCV 88.9 fL      MCH 30.1 pg      MCHC 33.8 g/dL      RDW 12.7 %      RDW-SD 41.3 fl      MPV 10.1 fL      Platelets 209 10*3/mm3      Neutrophil % 55.8 %      Lymphocyte % 34.8 %      Monocyte % 7.3 %      Eosinophil % 1.1 %      Basophil % 0.5 %      Immature Grans % 0.5 %      Neutrophils, Absolute 4.43 10*3/mm3      Lymphocytes, Absolute 2.76 10*3/mm3      Monocytes, Absolute 0.58 10*3/mm3      Eosinophils, Absolute 0.09 10*3/mm3      Basophils, Absolute 0.04 10*3/mm3      Immature Grans, Absolute 0.04 10*3/mm3      nRBC 0.0 /100 WBC           Imaging Results (Last 24 Hours)       Procedure Component Value Units Date/Time    XR Chest 1 View [[id number]] Resulted: 10/02/24 1512     Updated: 10/02/24 1521    CT Head Without Contrast [[id number]] Collected: 10/02/24 1113     Updated: 10/02/24 1119    Narrative:      CT HEAD WO CONTRAST    Date of Exam: 10/2/2024 10:56 AM EDT    Indication: syncope, hit head.    Comparison: None available.    Technique: Axial CT images were obtained of the head without contrast administration.  Automated exposure control and iterative construction methods were used.      Findings:  No acute intracranial hemorrhage. No acute large territory infarct. No extra-axial collections. No midline shift or herniation. Normal size and configuration of the ventricles. Unremarkable appearance of the orbits. Partially imaged paranasal sinuses are   clear. The mastoid air cells are clear. No acute or suspicious bony findings.      Impression:      Impression:  No acute intracranial findings.        Electronically Signed: Danny Zamora MD    10/2/2024 11:16 AM EDT    Workstation  ID: [id number]          ECG/EMG Results (last 24 hours)       Procedure Component Value Units Date/Time    ECG 12 Lead ED Triage Standing Order; Syncope [[id number]] Collected: 10/02/24 0902     Updated: 10/02/24 1025     QT Interval 354 ms      QTC Interval 365 ms     Narrative:      Test Reason : ED Triage Standing Order~  Blood Pressure :   */*   mmHG  Vent. Rate :  64 BPM     Atrial Rate :  64 BPM     P-R Int : 132 ms          QRS Dur :  90 ms      QT Int : 354 ms       P-R-T Axes :  49  29  28 degrees     QTc Int : 365 ms    ** Poor data quality, interpretation may be adversely affected  Normal sinus rhythm  Nonspecific T wave abnormality  Abnormal ECG  No previous ECGs available    Referred By: ED           Confirmed By:     ECG 12 Lead Other; repeats [[id number]] Collected: 10/02/24 1132     Updated: 10/02/24 1228     QT Interval 322 ms      QTC Interval 367 ms     Narrative:      Test Reason : Other~  Blood Pressure :   */*   mmHG  Vent. Rate :  78 BPM     Atrial Rate :  78 BPM     P-R Int : 140 ms          QRS Dur :  84 ms      QT Int : 322 ms       P-R-T Axes :  50  29  34 degrees     QTc Int : 367 ms    Normal sinus rhythm  Nonspecific T wave abnormality  Abnormal ECG  When compared with ECG of 02-OCT-2024 09:02, (Unconfirmed)  No significant change was found    Referred By: EDMD           Confirmed By:

## 2024-10-02 NOTE — ED PROVIDER NOTES
"Subjective   History of Present Illness  44-year-old male presents for evaluation of syncope.  The patient is accompanied by his significant other who corroborates his history.  This morning, he was at home with her and their puppy had defecated on the floor.  Shortly thereafter, he was going to clean it up when he began complaining of chest pain and numbness and tingling to his hands and then \"passed out.\"  He did strike his head.  He did lose consciousness.  Questionable seizure activity noted per significant other.  He did not bite his tongue.  No urinary incontinence.  He denies any similar episodes before in the past and denies any prior history of seizures.  He does not recall passing out.  He reportedly got quite diaphoretic with the episode.  He complained of chest pain after the episode as well.  As a result, he was brought here to the ED to be evaluated.  He currently rates his chest pain at 2 out of 10 in severity.  He has risk factors for coronary artery disease of positive family history and smoking.      Review of Systems   Cardiovascular:  Positive for chest pain.   Neurological:  Positive for seizures and syncope.   All other systems reviewed and are negative.      Past Medical History:   Diagnosis Date    ADHD     Depression     Erectile dysfunction 01/2022    PTSD (post-traumatic stress disorder)        No Known Allergies    Past Surgical History:   Procedure Laterality Date    APPENDECTOMY      CIRCUMCISION      COLONOSCOPY      Need to schedule one       Family History   Problem Relation Age of Onset    Diabetes Mother     Cancer Father     Other Father     Cancer Paternal Grandfather        Social History     Socioeconomic History    Marital status: Legally    Tobacco Use    Smoking status: Every Day     Current packs/day: 0.50     Average packs/day: 0.5 packs/day for 21.0 years (10.5 ttl pk-yrs)     Types: Cigarettes, Electronic Cigarette     Passive exposure: Current    Smokeless " tobacco: Never    Tobacco comments:     Quit for 7 years and started back 6 months ago   Vaping Use    Vaping status: Every Day    Substances: Nicotine, Flavoring    Devices: Disposable, Pre-filled pod    Passive vaping exposure: Yes   Substance and Sexual Activity    Alcohol use: Not Currently    Drug use: Not Currently    Sexual activity: Yes     Partners: Female     Birth control/protection: None           Objective   Physical Exam  Vitals and nursing note reviewed.   Constitutional:       General: He is not in acute distress.     Appearance: Normal appearance. He is well-developed. He is not diaphoretic.      Comments: Nontoxic-appearing male   HENT:      Head: Normocephalic and atraumatic.      Comments: No tongue laceration noted  Eyes:      Pupils: Pupils are equal, round, and reactive to light.   Neck:      Vascular: No JVD.      Comments: No meningeal signs or nuchal rigidity, no midline cervical spine tenderness noted, no step-off or deformity present  Cardiovascular:      Rate and Rhythm: Normal rate and regular rhythm.      Heart sounds: Normal heart sounds. No murmur heard.     No friction rub. No gallop.   Pulmonary:      Effort: Pulmonary effort is normal. No respiratory distress.      Breath sounds: Normal breath sounds. No wheezing or rales.   Abdominal:      General: Bowel sounds are normal. There is no distension.      Palpations: Abdomen is soft. There is no mass.      Tenderness: There is no abdominal tenderness. There is no guarding.   Musculoskeletal:         General: Normal range of motion.      Cervical back: Normal range of motion.   Skin:     General: Skin is warm and dry.      Coloration: Skin is not pale.      Findings: No erythema or rash.   Neurological:      Mental Status: He is alert and oriented to person, place, and time.      Comments: Awake, alert, and oriented x3, clear and fluent speech, no ataxia or dysmetria, normal gait, neurovascularly intact distally in all fours with  "bounding distal pulses and normal sensation, 5 out of 5 strength in all fours, no cranial nerve deficits noted with cranial nerves II through XII grossly intact   Psychiatric:         Mood and Affect: Mood normal.         Thought Content: Thought content normal.         Judgment: Judgment normal.         Critical Care    Performed by: Feng Butcher MD  Authorized by: Feng Butcher MD    Critical care provider statement:     Critical care time (minutes):  37    Critical care was necessary to treat or prevent imminent or life-threatening deterioration of the following conditions:  Cardiac failure    Critical care was time spent personally by me on the following activities:  Development of treatment plan with patient or surrogate, discussions with consultants, evaluation of patient's response to treatment, examination of patient, obtaining history from patient or surrogate, ordering and performing treatments and interventions, ordering and review of laboratory studies, pulse oximetry, ordering and review of radiographic studies and re-evaluation of patient's condition             ED Course  ED Course as of 10/02/24 1619   Wed Oct 02, 2024   1040 44-year-old male presents for evaluation of syncope.  He is accompanied by his significant other who corroborates his history.  This morning, he was at home with her and their puppy had defecated on the floor.  He was going to clean it up when he complained of chest pain and numbness and tingling to his hands and then \"passed out.\"  He struck his head.  He did lose consciousness.  Questionable seizure activity noted per significant other.  He did not bite his tongue.  No urinary incontinence.  No similar episodes before in the past.  As a result of the episode, the patient was brought here to the ED to be evaluated.  On arrival, the patient is nontoxic-appearing.  Benign exam.  Vital signs are reassuring. [DD]   1042 Initial EKG interpreted independently by me " revealed normal sinus rhythm with a heart rate of 64 with nonspecific T wave flattening noted to inferior leads and normal IL/QT intervals.  No EKG evidence of Brugada syndrome or hypertrophic cardiomyopathy noted.  The patient denies any personal history of recurrent syncopal episodes or any family history of sudden cardiac death. [DD]   1042 He has risk factors for coronary artery disease of smoking and positive family history. [DD]   1042 Broad differential diagnosis.  We will obtain labs and imaging and will reassess following initial interventions. [DD]   1042 Labs are unrevealing.  Initial high-sensitivity troponin is negative. [DD]   1042 HEART score of 2. [DD]   1147 I personally and independently reviewed the patient's CT images and findings, and I am in agreement with the radiologist regarding CT interpretation--particularly there is no emergent intracranial process present. [DD]   1205 The patient's repeat troponin was significantly elevated with significant delta at 62. [DD]   1205 After reviewing the patient's labs and imaging, I discussed the patient's case with our cardiology team.  Aspirin given.  Heparin initiated.  They will come and evaluate the patient in consult. [DD]   1205 Low risk Well's and PERC negative. [DD]   1349 The patient will be admitted under the care of our cardiology team and will be taken to the Cath Lab for his NSTEMI. [DD]      ED Course User Index  [DD] Feng Butcher MD                                   Recent Results (from the past 24 hour(s))   ECG 12 Lead ED Triage Standing Order; Syncope    Collection Time: 10/02/24  9:02 AM   Result Value Ref Range    QT Interval 354 ms    QTC Interval 365 ms   Comprehensive Metabolic Panel    Collection Time: 10/02/24  9:11 AM    Specimen: Blood   Result Value Ref Range    Glucose 167 (H) 65 - 99 mg/dL    BUN 16 6 - 20 mg/dL    Creatinine 1.46 (H) 0.76 - 1.27 mg/dL    Sodium 138 136 - 145 mmol/L    Potassium 4.1 3.5 - 5.2 mmol/L     Chloride 101 98 - 107 mmol/L    CO2 29.0 22.0 - 29.0 mmol/L    Calcium 9.3 8.6 - 10.5 mg/dL    Total Protein 6.5 6.0 - 8.5 g/dL    Albumin 4.1 3.5 - 5.2 g/dL    ALT (SGPT) 56 (H) 1 - 41 U/L    AST (SGOT) 30 1 - 40 U/L    Alkaline Phosphatase 99 39 - 117 U/L    Total Bilirubin 0.4 0.0 - 1.2 mg/dL    Globulin 2.4 gm/dL    A/G Ratio 1.7 g/dL    BUN/Creatinine Ratio 11.0 7.0 - 25.0    Anion Gap 8.0 5.0 - 15.0 mmol/L    eGFR 60.4 >60.0 mL/min/1.73   Magnesium    Collection Time: 10/02/24  9:11 AM    Specimen: Blood   Result Value Ref Range    Magnesium 1.8 1.6 - 2.6 mg/dL   Single High Sensitivity Troponin T    Collection Time: 10/02/24  9:11 AM    Specimen: Blood   Result Value Ref Range    HS Troponin T 8 <22 ng/L   Green Top (Gel)    Collection Time: 10/02/24  9:11 AM   Result Value Ref Range    Extra Tube Hold for add-ons.    Lavender Top    Collection Time: 10/02/24  9:11 AM   Result Value Ref Range    Extra Tube hold for add-on    Gold Top - SST    Collection Time: 10/02/24  9:11 AM   Result Value Ref Range    Extra Tube Hold for add-ons.    Gray Top    Collection Time: 10/02/24  9:11 AM   Result Value Ref Range    Extra Tube Hold for add-ons.    Light Blue Top    Collection Time: 10/02/24  9:11 AM   Result Value Ref Range    Extra Tube Hold for add-ons.    CBC Auto Differential    Collection Time: 10/02/24  9:11 AM    Specimen: Blood   Result Value Ref Range    WBC 7.94 3.40 - 10.80 10*3/mm3    RBC 5.59 4.14 - 5.80 10*6/mm3    Hemoglobin 16.8 13.0 - 17.7 g/dL    Hematocrit 49.7 37.5 - 51.0 %    MCV 88.9 79.0 - 97.0 fL    MCH 30.1 26.6 - 33.0 pg    MCHC 33.8 31.5 - 35.7 g/dL    RDW 12.7 12.3 - 15.4 %    RDW-SD 41.3 37.0 - 54.0 fl    MPV 10.1 6.0 - 12.0 fL    Platelets 209 140 - 450 10*3/mm3    Neutrophil % 55.8 42.7 - 76.0 %    Lymphocyte % 34.8 19.6 - 45.3 %    Monocyte % 7.3 5.0 - 12.0 %    Eosinophil % 1.1 0.3 - 6.2 %    Basophil % 0.5 0.0 - 1.5 %    Immature Grans % 0.5 0.0 - 0.5 %    Neutrophils, Absolute  4.43 1.70 - 7.00 10*3/mm3    Lymphocytes, Absolute 2.76 0.70 - 3.10 10*3/mm3    Monocytes, Absolute 0.58 0.10 - 0.90 10*3/mm3    Eosinophils, Absolute 0.09 0.00 - 0.40 10*3/mm3    Basophils, Absolute 0.04 0.00 - 0.20 10*3/mm3    Immature Grans, Absolute 0.04 0.00 - 0.05 10*3/mm3    nRBC 0.0 0.0 - 0.2 /100 WBC   Protime-INR    Collection Time: 10/02/24  9:11 AM    Specimen: Blood   Result Value Ref Range    Protime 12.5 12.2 - 14.5 Seconds    INR 0.92 0.89 - 1.12   aPTT    Collection Time: 10/02/24  9:11 AM    Specimen: Blood   Result Value Ref Range    PTT 26.6 (L) 60.0 - 90.0 seconds   Single High Sensitivity Troponin T    Collection Time: 10/02/24 11:27 AM    Specimen: Blood   Result Value Ref Range    HS Troponin T 62 (C) <22 ng/L   ECG 12 Lead Other; repeats    Collection Time: 10/02/24 11:32 AM   Result Value Ref Range    QT Interval 322 ms    QTC Interval 367 ms   Heparin Anti-Xa    Collection Time: 10/02/24 12:26 PM    Specimen: Blood   Result Value Ref Range    Heparin Anti-Xa (UFH) 0.10 (L) 0.30 - 0.70 IU/ml   High Sensitivity Troponin T 2Hr    Collection Time: 10/02/24  3:23 PM    Specimen: Blood   Result Value Ref Range    HS Troponin T 36 (H) <22 ng/L    Troponin T Delta -26 (L) >=-4 - <+4 ng/L     Note: In addition to lab results from this visit, the labs listed above may include labs taken at another facility or during a different encounter within the last 24 hours. Please correlate lab times with ED admission and discharge times for further clarification of the services performed during this visit.    XR Chest 1 View   Final Result   Impression:   1.No acute radiographic abnormality is identified.         Electronically Signed: Juan Jenkins MD     10/2/2024 3:28 PM EDT     Workstation ID: [id number]      CT Head Without Contrast   Final Result   Impression:   No acute intracranial findings.            Electronically Signed: Danny Zamora MD     10/2/2024 11:16 AM EDT     Workstation ID: [id number]       CT Angiogram Chest    (Results Pending)     Vitals:    10/02/24 1515 10/02/24 1530 10/02/24 1545 10/02/24 1600   BP: 112/85 125/81 121/82 122/88   BP Location:       Patient Position:       Pulse: 81 83 81 75   Resp:       Temp:       TempSrc:       SpO2: 96% 96% 97% 96%   Weight:       Height:         Medications   Testosterone Cypionate (DEPOTESTOTERONE CYPIONATE) injection 200 mg (has no administration in time range)   sodium chloride 0.9 % flush 10 mL (has no administration in time range)   sodium chloride 0.9 % flush 10 mL (has no administration in time range)   sodium chloride 0.9 % infusion 40 mL (has no administration in time range)   Potassium Replacement - Follow Nurse / BPA Driven Protocol (has no administration in time range)   Phosphorus Replacement - Follow Nurse / BPA Driven Protocol (has no administration in time range)   Calcium Replacement - Follow Nurse / BPA Driven Protocol (has no administration in time range)   Magnesium Cardiology Dose Replacement - Follow Nurse / BPA Driven Protocol (has no administration in time range)   sodium chloride 0.9 % with KCl 20 mEq/L infusion (100 mL/hr Intravenous New Bag 10/2/24 1619)   aspirin chewable tablet 81 mg ( Oral MAR Unhold 10/2/24 1522)   atorvastatin (LIPITOR) tablet 80 mg ( Oral MAR Unhold 10/2/24 1522)   nitroglycerin (NITROSTAT) SL tablet 0.4 mg (has no administration in time range)   acetaminophen (TYLENOL) tablet 650 mg (has no administration in time range)   sodium chloride 0.9 % infusion ( Intravenous Canceled Entry 10/2/24 1603)   HYDROcodone-acetaminophen (NORCO) 5-325 MG per tablet 1 tablet (has no administration in time range)   metoprolol tartrate (LOPRESSOR) tablet 25 mg (has no administration in time range)   magnesium sulfate 4g/100mL (PREMIX) infusion (has no administration in time range)   sodium chloride 0.9 % bolus 1,000 mL (1,000 mL Intravenous New Bag 10/2/24 1018)   aspirin chewable tablet 324 mg (324 mg Oral Given 10/2/24  1320)     ECG/EMG Results (last 24 hours)       Procedure Component Value Units Date/Time    ECG 12 Lead ED Triage Standing Order; Syncope [[id number]] Collected: 10/02/24 0902     Updated: 10/02/24 1531     QT Interval 354 ms      QTC Interval 365 ms     Narrative:      Test Reason : ED Triage Standing Order~  Blood Pressure :   */*   mmHG  Vent. Rate :  64 BPM     Atrial Rate :  64 BPM     P-R Int : 132 ms          QRS Dur :  90 ms      QT Int : 354 ms       P-R-T Axes :  49  29  28 degrees     QTc Int : 365 ms    ** Poor data quality, interpretation may be adversely affected  Normal sinus rhythm  Nonspecific T wave abnormality  Abnormal ECG  No previous ECGs available  Confirmed by MD Butcher Michael ([id number]) on 10/2/2024 3:31:15 PM    Referred By: ED           Confirmed By: Osmar Butcher MD    ECG 12 Lead Other; repeats [[id number]] Collected: 10/02/24 1132     Updated: 10/02/24 1533     QT Interval 322 ms      QTC Interval 367 ms     Narrative:      Test Reason : Other~  Blood Pressure :   */*   mmHG  Vent. Rate :  78 BPM     Atrial Rate :  78 BPM     P-R Int : 140 ms          QRS Dur :  84 ms      QT Int : 322 ms       P-R-T Axes :  50  29  34 degrees     QTc Int : 367 ms    Normal sinus rhythm  Nonspecific T wave abnormality  Abnormal ECG  When compared with ECG of 02-OCT-2024 09:02, (Unconfirmed)  No significant change was found  Confirmed by MD Butcher Michael ([id number]) on 10/2/2024 3:32:05 PM    Referred By: EDMD           Confirmed By: Osmar Butcher MD          ECG 12 Lead Other; repeats   Final Result   Test Reason : Other~   Blood Pressure :   */*   mmHG   Vent. Rate :  78 BPM     Atrial Rate :  78 BPM      P-R Int : 140 ms          QRS Dur :  84 ms       QT Int : 322 ms       P-R-T Axes :  50  29  34 degrees      QTc Int : 367 ms      Normal sinus rhythm   Nonspecific T wave abnormality   Abnormal ECG   When compared with ECG of 02-OCT-2024 09:02, (Unconfirmed)   No significant change was found   Confirmed by  MD Butcher Michael ([id number]) on 10/2/2024 3:32:05 PM      Referred By: EDMD           Confirmed By: Osmar Butcher MD      ECG 12 Lead ED Triage Standing Order; Syncope   Final Result   Test Reason : ED Triage Standing Order~   Blood Pressure :   */*   mmHG   Vent. Rate :  64 BPM     Atrial Rate :  64 BPM      P-R Int : 132 ms          QRS Dur :  90 ms       QT Int : 354 ms       P-R-T Axes :  49  29  28 degrees      QTc Int : 365 ms      ** Poor data quality, interpretation may be adversely affected   Normal sinus rhythm   Nonspecific T wave abnormality   Abnormal ECG   No previous ECGs available   Confirmed by MD Butcher Michael ([id number]) on 10/2/2024 3:31:15 PM      Referred By: ED           Confirmed By: Osmar Butcher MD                    Medical Decision Making  Amount and/or Complexity of Data Reviewed  Labs: ordered.  Radiology: ordered.  ECG/medicine tests: ordered.    Risk  OTC drugs.  Decision regarding hospitalization.        Final diagnoses:   NSTEMI (non-ST elevated myocardial infarction)   Syncope, unspecified syncope type   Seizure-like activity       ED Disposition  ED Disposition       ED Disposition   Decision to Admit    Condition   --    Comment   Level of Care: Telemetry [5]   Diagnosis: NSTEMI (non-ST elevated myocardial infarction) [930765]   Isolate for COVID?: No [0]   Certification: I Certify That Inpatient Hospital Services Are Medically Necessary For Greater Than 2 Midnights                 No follow-up provider specified.       Medication List      No changes were made to your prescriptions during this visit.            Feng Butcher MD  10/02/24 5113

## 2024-10-02 NOTE — ED NOTES
Behzad Chi    Nursing Report ED to Floor:  Mental status: A&OX4  Ambulatory status: INDEPENDENT   Oxygen Therapy:  RA  Cardiac Rhythm: NSTEMI  Admitted from: ED  Safety Concerns:  N/A  Social Issues: N/A  ED Room #:  22    ED Nurse Phone Extension - [phone] or may call [phone].      HPI:   Chief Complaint   Patient presents with    Syncope       Past Medical History:  Past Medical History:   Diagnosis Date    ADHD     Depression     Erectile dysfunction 01/2022    PTSD (post-traumatic stress disorder)         Past Surgical History:  Past Surgical History:   Procedure Laterality Date    APPENDECTOMY      CIRCUMCISION      COLONOSCOPY      Need to schedule one        Admitting Doctor:   No admitting provider for patient encounter.    Consulting Provider(s):  Consults       No orders found from 9/3/2024 to 10/3/2024.             Admitting Diagnosis:   There were no encounter diagnoses.    Most Recent Vitals:   Vitals:    10/02/24 1118 10/02/24 1128 10/02/24 1200 10/02/24 1232   BP: 126/85 133/84 120/78 126/82   BP Location:       Patient Position:       Pulse: 92 84 80 86   Resp:       Temp:       TempSrc:       SpO2: 99% 97% 96% 97%   Weight:       Height:           Active LDAs/IV Access:   Lines, Drains & Airways       Active LDAs       Name Placement date Placement time Site Days    Peripheral IV 10/02/24 0901 Left Antecubital 10/02/24  0901  Antecubital  less than 1                    Labs (abnormal labs have a star):   Labs Reviewed   COMPREHENSIVE METABOLIC PANEL - Abnormal; Notable for the following components:       Result Value    Glucose 167 (*)     Creatinine 1.46 (*)     ALT (SGPT) 56 (*)     All other components within normal limits    Narrative:     GFR Normal >60  Chronic Kidney Disease <60  Kidney Failure <15     SINGLE HS TROPONIN T - Abnormal; Notable for the following components:    HS Troponin T 62 (*)     All other components within normal limits    Narrative:     High Sensitive Troponin T Reference  Range:  <14.0 ng/L- Negative Female for AMI  <22.0 ng/L- Negative Male for AMI  >=14 - Abnormal Female indicating possible myocardial injury.  >=22 - Abnormal Male indicating possible myocardial injury.   Clinicians would have to utilize clinical acumen, EKG, Troponin, and serial changes to determine if it is an Acute Myocardial Infarction or myocardial injury due to an underlying chronic condition.        APTT - Abnormal; Notable for the following components:    PTT 26.6 (*)     All other components within normal limits    Narrative:     PTT = The equivalent PTT values for the therapeutic range of heparin levels at 0.3 to 0.5 U/ml are 60 to 70 seconds.   MAGNESIUM - Normal   SINGLE HS TROPONIN T - Normal    Narrative:     High Sensitive Troponin T Reference Range:  <14.0 ng/L- Negative Female for AMI  <22.0 ng/L- Negative Male for AMI  >=14 - Abnormal Female indicating possible myocardial injury.  >=22 - Abnormal Male indicating possible myocardial injury.   Clinicians would have to utilize clinical acumen, EKG, Troponin, and serial changes to determine if it is an Acute Myocardial Infarction or myocardial injury due to an underlying chronic condition.        CBC WITH AUTO DIFFERENTIAL - Normal   PROTIME-INR - Normal   RAINBOW DRAW    Narrative:     The following orders were created for panel order [place] Draw.  Procedure                               Abnormality         Status                     ---------                               -----------         ------                     Green Top (Gel)[[id number]]                                  Final result               Lavender Top[[id number]]                                     Final result               Gold Top - SST[[id number]]                                   Final result               Gonzalez Top[[id number]]                                         Final result               Light Blue Top[[id number]]                                   Final result                  Please view results for these tests on the individual orders.   HIGH SENSITIVITIY TROPONIN T 2HR   HEPARIN ANTI XA   CBC AND DIFFERENTIAL    Narrative:     The following orders were created for panel order CBC & Differential.  Procedure                               Abnormality         Status                     ---------                               -----------         ------                     CBC Auto Differential[[id number]]        Normal              Final result                 Please view results for these tests on the individual orders.   GREEN TOP   LAVENDER TOP   GOLD TOP - SST   GRAY TOP   LIGHT BLUE TOP       Meds Given in ED:   Medications   sodium chloride 0.9 % flush 10 mL (has no administration in time range)   heparin (porcine) injection 4,000 Units (has no administration in time range)   heparin 13287 units/250 mL (100 units/mL) in 0.45 % NaCl infusion (has no administration in time range)   Pharmacy to Dose Heparin (has no administration in time range)   aspirin chewable tablet 324 mg (has no administration in time range)   sodium chloride 0.9 % bolus 1,000 mL (1,000 mL Intravenous New Bag 10/2/24 1018)     heparin, 1,000 Units/hr  Pharmacy to Dose Heparin,          Last NIH score:                                                          Dysphagia screening results:  Patient Factors Component (Dysphagia:Stroke or Rule-out)  Best Eye Response: 4-->(E4) spontaneous (10/02/24 0910)  Best Motor Response: 6-->(M6) obeys commands (10/02/24 0910)  Best Verbal Response: 5-->(V5) oriented (10/02/24 0910)  [place] Coma Scale Score: 15 (10/02/24 0910)     [place] Coma Scale:  No data recorded     CIWA:        Restraint Type:            Isolation Status:  No active isolations

## 2024-10-03 ENCOUNTER — APPOINTMENT (OUTPATIENT)
Dept: CT IMAGING | Facility: HOSPITAL | Age: 44
DRG: 391 | End: 2024-10-03
Payer: COMMERCIAL

## 2024-10-03 ENCOUNTER — READMISSION MANAGEMENT (OUTPATIENT)
Dept: CALL CENTER | Facility: HOSPITAL | Age: 44
End: 2024-10-03
Payer: COMMERCIAL

## 2024-10-03 VITALS
TEMPERATURE: 98.9 F | OXYGEN SATURATION: 95 % | HEART RATE: 89 BPM | SYSTOLIC BLOOD PRESSURE: 128 MMHG | RESPIRATION RATE: 18 BRPM | BODY MASS INDEX: 30.82 KG/M2 | HEIGHT: 65 IN | WEIGHT: 185 LBS | DIASTOLIC BLOOD PRESSURE: 87 MMHG

## 2024-10-03 DIAGNOSIS — R55 SYNCOPE AND COLLAPSE: Primary | ICD-10-CM

## 2024-10-03 LAB
ANION GAP SERPL CALCULATED.3IONS-SCNC: 10 MMOL/L (ref 5–15)
ASCENDING AORTA: 2.6 CM
BH CV ECHO MEAS - AO MAX PG: 7.1 MMHG
BH CV ECHO MEAS - AO MEAN PG: 4 MMHG
BH CV ECHO MEAS - AO ROOT DIAM: 2.6 CM
BH CV ECHO MEAS - AO V2 MAX: 133 CM/SEC
BH CV ECHO MEAS - AO V2 VTI: 22.9 CM
BH CV ECHO MEAS - AVA(I,D): 193 CM2
BH CV ECHO MEAS - EF(MOD-BP): 59 %
BH CV ECHO MEAS - IVS/LVPW: 0.75 CM
BH CV ECHO MEAS - IVSD: 0.9 CM
BH CV ECHO MEAS - LA DIMENSION: 3.2 CM
BH CV ECHO MEAS - LAT PEAK E' VEL: 13.3 CM/SEC
BH CV ECHO MEAS - LV MAX PG: 2.17 MMHG
BH CV ECHO MEAS - LV MEAN PG: 0.9 MMHG
BH CV ECHO MEAS - LV V1 MAX: 73.6 CM/SEC
BH CV ECHO MEAS - LV V1 VTI: 14.1 CM
BH CV ECHO MEAS - LVIDD: 4.6 CM
BH CV ECHO MEAS - LVIDS: 3 CM
BH CV ECHO MEAS - LVOT DIAM: 2 CM
BH CV ECHO MEAS - LVPWD: 1.2 CM
BH CV ECHO MEAS - MED PEAK E' VEL: 10.1 CM/SEC
BH CV ECHO MEAS - MV A MAX VEL: 50.4 CM/SEC
BH CV ECHO MEAS - MV DEC SLOPE: 424 CM/SEC2
BH CV ECHO MEAS - MV E MAX VEL: 75.4 CM/SEC
BH CV ECHO MEAS - MV E/A: 1.5
BH CV ECHO MEAS - MV P1/2T: 67 MSEC
BH CV ECHO MEAS - MVA(P1/2T): 3.3 CM2
BH CV ECHO MEAS - PA ACC TIME: 0.04 SEC
BH CV ECHO MEAS - PA V2 MAX: 108.7 CM/SEC
BH CV ECHO MEAS - PAPD(PI EDV): 5 MMHG
BH CV ECHO MEAS - PI END-D VEL: 108.1 CM/SEC
BH CV ECHO MEAS - RAP SYSTOLE: 8 MMHG
BH CV ECHO MEAS - RV MAX PG: 1.08 MMHG
BH CV ECHO MEAS - RV V1 MAX: 51.7 CM/SEC
BH CV ECHO MEAS - RV V1 VTI: 13 CM
BH CV ECHO MEAS - TAPSE (>1.6): 2.6 CM
BH CV ECHO MEASUREMENTS AVERAGE E/E' RATIO: 6.44
BH CV XLRA - RV BASE: 4.8 CM
BH CV XLRA - RV LENGTH: 8.3 CM
BH CV XLRA - RV MID: 3.8 CM
BH CV XLRA - TDI S': 12.6 CM/SEC
BUN SERPL-MCNC: 11 MG/DL (ref 6–20)
BUN/CREAT SERPL: 11 (ref 7–25)
CALCIUM SPEC-SCNC: 8.6 MG/DL (ref 8.6–10.5)
CHLORIDE SERPL-SCNC: 102 MMOL/L (ref 98–107)
CHOLEST SERPL-MCNC: 222 MG/DL (ref 0–200)
CO2 SERPL-SCNC: 24 MMOL/L (ref 22–29)
CREAT SERPL-MCNC: 1 MG/DL (ref 0.76–1.27)
DEPRECATED RDW RBC AUTO: 43.2 FL (ref 37–54)
EGFRCR SERPLBLD CKD-EPI 2021: 95.2 ML/MIN/1.73
ERYTHROCYTE [DISTWIDTH] IN BLOOD BY AUTOMATED COUNT: 12.9 % (ref 12.3–15.4)
GLUCOSE SERPL-MCNC: 99 MG/DL (ref 65–99)
HBA1C MFR BLD: 5.4 % (ref 4.8–5.6)
HCT VFR BLD AUTO: 50.3 % (ref 37.5–51)
HDLC SERPL-MCNC: 36 MG/DL (ref 40–60)
HGB BLD-MCNC: 16.4 G/DL (ref 13–17.7)
IVRT: 79 MS
LDLC SERPL CALC-MCNC: 135 MG/DL (ref 0–100)
LDLC/HDLC SERPL: 3.6 {RATIO}
LEFT ATRIUM VOLUME INDEX: 26 ML/M2
LV EF 2D ECHO EST: 59 %
MAGNESIUM SERPL-MCNC: 2.2 MG/DL (ref 1.6–2.6)
MCH RBC QN AUTO: 30.1 PG (ref 26.6–33)
MCHC RBC AUTO-ENTMCNC: 32.6 G/DL (ref 31.5–35.7)
MCV RBC AUTO: 92.5 FL (ref 79–97)
PLATELET # BLD AUTO: 213 10*3/MM3 (ref 140–450)
PMV BLD AUTO: 10.2 FL (ref 6–12)
POTASSIUM SERPL-SCNC: 4.2 MMOL/L (ref 3.5–5.2)
RBC # BLD AUTO: 5.44 10*6/MM3 (ref 4.14–5.8)
SODIUM SERPL-SCNC: 136 MMOL/L (ref 136–145)
TRIGL SERPL-MCNC: 282 MG/DL (ref 0–150)
TSH SERPL DL<=0.05 MIU/L-ACNC: 2.15 UIU/ML (ref 0.27–4.2)
VLDLC SERPL-MCNC: 51 MG/DL (ref 5–40)
WBC NRBC COR # BLD AUTO: 11.63 10*3/MM3 (ref 3.4–10.8)

## 2024-10-03 PROCEDURE — 83735 ASSAY OF MAGNESIUM: CPT | Performed by: INTERNAL MEDICINE

## 2024-10-03 PROCEDURE — 25510000001 IOPAMIDOL PER 1 ML: Performed by: INTERNAL MEDICINE

## 2024-10-03 PROCEDURE — 25010000002 SODIUM CHLORIDE 0.9 % WITH KCL 20 MEQ 20-0.9 MEQ/L-% SOLUTION: Performed by: INTERNAL MEDICINE

## 2024-10-03 PROCEDURE — 83036 HEMOGLOBIN GLYCOSYLATED A1C: CPT | Performed by: INTERNAL MEDICINE

## 2024-10-03 PROCEDURE — 71275 CT ANGIOGRAPHY CHEST: CPT

## 2024-10-03 PROCEDURE — 80061 LIPID PANEL: CPT | Performed by: INTERNAL MEDICINE

## 2024-10-03 PROCEDURE — 85027 COMPLETE CBC AUTOMATED: CPT | Performed by: INTERNAL MEDICINE

## 2024-10-03 PROCEDURE — 80048 BASIC METABOLIC PNL TOTAL CA: CPT | Performed by: INTERNAL MEDICINE

## 2024-10-03 PROCEDURE — 99238 HOSP IP/OBS DSCHRG MGMT 30/<: CPT | Performed by: INTERNAL MEDICINE

## 2024-10-03 PROCEDURE — 84443 ASSAY THYROID STIM HORMONE: CPT | Performed by: INTERNAL MEDICINE

## 2024-10-03 RX ORDER — PANTOPRAZOLE SODIUM 40 MG/1
40 TABLET, DELAYED RELEASE ORAL NIGHTLY
Qty: 30 TABLET | Refills: 11 | Status: SHIPPED | OUTPATIENT
Start: 2024-10-03

## 2024-10-03 RX ORDER — METOPROLOL TARTRATE 25 MG/1
25 TABLET, FILM COATED ORAL EVERY 12 HOURS SCHEDULED
Qty: 60 TABLET | Refills: 11 | Status: SHIPPED | OUTPATIENT
Start: 2024-10-03

## 2024-10-03 RX ORDER — IOPAMIDOL 755 MG/ML
100 INJECTION, SOLUTION INTRAVASCULAR
Status: COMPLETED | OUTPATIENT
Start: 2024-10-03 | End: 2024-10-03

## 2024-10-03 RX ORDER — ASPIRIN 81 MG/1
81 TABLET, CHEWABLE ORAL DAILY
Qty: 30 TABLET | Refills: 11 | Status: SHIPPED | OUTPATIENT
Start: 2024-10-04

## 2024-10-03 RX ORDER — PANTOPRAZOLE SODIUM 40 MG/1
40 TABLET, DELAYED RELEASE ORAL NIGHTLY
Status: DISCONTINUED | OUTPATIENT
Start: 2024-10-03 | End: 2024-10-03 | Stop reason: HOSPADM

## 2024-10-03 RX ORDER — ATORVASTATIN CALCIUM 80 MG/1
80 TABLET, FILM COATED ORAL NIGHTLY
Qty: 30 TABLET | Refills: 11 | Status: SHIPPED | OUTPATIENT
Start: 2024-10-03 | End: 2024-10-08 | Stop reason: DRUGHIGH

## 2024-10-03 RX ADMIN — METOPROLOL TARTRATE 25 MG: 25 TABLET, FILM COATED ORAL at 09:04

## 2024-10-03 RX ADMIN — POTASSIUM CHLORIDE AND SODIUM CHLORIDE 100 ML/HR: 900; 150 INJECTION, SOLUTION INTRAVENOUS at 02:40

## 2024-10-03 RX ADMIN — HYDROCODONE BITARTRATE AND ACETAMINOPHEN 1 TABLET: 5; 325 TABLET ORAL at 00:21

## 2024-10-03 RX ADMIN — ASPIRIN 81 MG 81 MG: 81 TABLET ORAL at 09:04

## 2024-10-03 RX ADMIN — Medication 10 ML: at 09:05

## 2024-10-03 RX ADMIN — IOPAMIDOL 100 ML: 755 INJECTION, SOLUTION INTRAVENOUS at 08:50

## 2024-10-03 NOTE — PAYOR COMM NOTE
"Behzad Chi (44 y.o. Male)     Sandy David, ULISES  Utilization Review  [phone]  [phone]        Date of Birth   1980    Social Security Number       Address   [address] [place] [address]    Home Phone   [phone]    MRN   [id number]       Holiness   Ep[employer]opalian    Marital Status   Legally                             Admission Date   10/2/24    Admission Type   Emergency    Admitting Provider   Rakesh Alvarez MD    Attending Provider   Rakesh Alvarez MD    Department, Room/Bed   [facility] 6A, N601/1       Discharge Date       Discharge Disposition   Home or Self Care    Discharge Destination                                 Attending Provider: Rakesh Alvarez MD    Allergies: No Known Allergies    Isolation: None   Infection: None   Code Status: CPR    Ht: 165.1 cm (65\")   Wt: 83.9 kg (185 lb)    Admission Cmt: None   Principal Problem: NSTEMI (non-ST elevated myocardial infarction) [I21.4]                   Active Insurance as of 10/2/2024       Primary Coverage       Payor Plan Insurance Group Employer/Plan Group    [employer] [place] [employer] [place]ICA[place]        Payor Plan Address Payor Plan Phone Number Payor Plan Fax Number Effective Dates    [address] [address] [phone]  8/22/2023 - None Entered    [facility] [id number]         Subscriber Name Subscriber Birth Date Member ID       BEHZAD CHI 1980 [id number]                     Emergency Contacts        (Rel.) Home Phone Work Phone Mobile Phone    JOSH MCCARTY (Father) -- -- [phone]                 Discharge Summary        Rakesh Alvarez MD at 10/03/24 1012       Summary:Discharge Summary                   Date of Discharge:  10/3/2024    Discharge Diagnosis: Syncope    Presenting Problem/History of Present Illness  Active Hospital Problems    Diagnosis  POA    **NSTEMI (non-ST elevated myocardial infarction) [I21.4]  Yes      Resolved " Hospital Problems   No resolved problems to display.          Hospital Course  Patient is a 44 y.o. male presented with syncope and chest pain.  Patient was found to have elevated cardiac biomarkers as brought to catheterization that was unremarkable.  Echocardiogram was also relatively unremarkable.  Patient ultimately underwent CTA of his chest that was also unremarkable.  Patient was discharged home with a 14-day patch monitor..      Procedures Performed    Procedure(s):  Left Heart Cath  -------------------       Consults:   Consults       No orders found for last 30 day(s).            Pertinent Test Results: Luminal coronary disease dilated right ventricle noted on echo    Condition on Discharge: Stable    Vital Signs  Temp:  [98 °F (36.7 °C)-98.9 °F (37.2 °C)] 98.9 °F (37.2 °C)  Heart Rate:  [66-92] 89  Resp:  [15-18] 18  BP: (112-150)/(78-91) 128/87    Physical Exam:     General Appearance:  Alert, cooperative, in no acute distress   Head:  Normocephalic, without obvious abnormality, atraumatic   Eyes:  Lids and lashes normal, conjunctivae and sclerae normal, no icterus, no pallor, corneas clear, PERRLA   Ears:  Ears appear intact with no abnormalities noted   Throat:  No oral lesions, no thrush, oral mucosa moist   Neck:  No adenopathy, supple, trachea midline, no thyromegaly, no carotid bruit, no JVD   Back:  No kyphosis present, no scoliosis present, no skin lesions, erythema or scars, no tenderness to percussion or palpation, range of motion normal   Lungs:  Clear to auscultation, respirations regular, even and unlabored    Heart:  Regular rhythm and normal rate, normal S1 and S2, no murmur, no gallop, no rub, no click   Chest Wall:  No abnormalities observed   Abdomen:  Normal bowel sounds, no masses, no organomegaly, soft non-tender, non-distended, no guarding, no rebound tenderness   Rectal:  Deferred   Extremities:  Moves all extremities well, no edema, no cyanosis, no redness   Pulses:  Pulses  palpable and equal bilaterally   Skin:  No bleeding, bruising or rash   Lymph nodes:  No palpable adenopathy   Neurologic:  Cranial nerves 2 - 12 grossly intact, sensation intact, DTR present and equal bilaterally                                                                               Discharge Disposition  Home or Self Care    Discharge Medications     Discharge Medications        New Medications        Instructions Start Date   aspirin 81 MG chewable tablet   81 mg, Oral, Daily   Start Date: October 4, 2024     atorvastatin 80 MG tablet  Commonly known as: LIPITOR   80 mg, Oral, Nightly      metoprolol tartrate 25 MG tablet  Commonly known as: LOPRESSOR   25 mg, Oral, Every 12 Hours Scheduled      pantoprazole 40 MG EC tablet  Commonly known as: PROTONIX   40 mg, Oral, Nightly             Continue These Medications        Instructions Start Date   amphetamine-dextroamphetamine XR 30 MG 24 hr capsule  Commonly known as: Adderall XR   30 mg, Oral, Every Morning      amphetamine-dextroamphetamine 10 MG tablet  Commonly known as: Adderall   Take 1 pill orally every afternoon      naloxone 4 MG/0.1ML nasal spray  Commonly known as: NARCAN       sildenafil 100 MG tablet  Commonly known as: Viagra   100 mg, Oral, Daily PRN      Sublocade 300 MG/1.5ML solution prefilled syringe  Generic drug: Buprenorphine ER   INJECT 300MG SUBCUTANEOUSLY ONCE MONTHLY      Testosterone Cypionate 200 MG/ML injection  Commonly known as: DEPOTESTOTERONE CYPIONATE   200 mg, Intramuscular, Every 14 Days      vitamin D 1.25 MG (17224 UT) capsule capsule  Commonly known as: ERGOCALCIFEROL   50,000 Units, Oral, Weekly               Discharge Diet: Regular    Activity at Discharge: No lifting more than 5 pounds for 5 days no soaking wound for 3 days no driving 24 hours    Follow-up Appointments  Future Appointments   Date Time Provider Department Center   10/8/2024  8:15 AM Cristiana Jackson DO [facility]   11/5/2024  9:15 AM Antonio  Rakesh Toney MD [facility]         Test Results Pending at Discharge: Patient will have a monitor placed       Rakesh Alvarez MD  10/03/24  10:12 EDT                Electronically signed by Rakesh Alvarez MD at 10/03/24 101

## 2024-10-03 NOTE — OUTREACH NOTE
Prep Survey      Flowsheet Row Responses   [facility] facility patient discharged from? [place]   Is LACE score < 7 ? Yes   Eligibility [facility]   Date of Admission 10/02/24   Date of Discharge 10/03/24   Discharge Disposition Home or Self Care   Discharge diagnosis NSTEMI (non-ST elevated myocardial infarction)   Does the patient have one of the following disease processes/diagnoses(primary or secondary)? Acute MI (STEMI,NSTEMI)   Does the patient have Home health ordered? No   Is there a DME ordered? No   Prep survey completed? Yes            CHUCHO ZELAYA - Registered Nurse

## 2024-10-03 NOTE — PROGRESS NOTES
LOS: 1 day   Patient Care Team:  Cristiana Jackson DO as PCP - General (Family Medicine)    Chief Complaint: Chest pain    Subjective     Interval History:     No events overnight.  No chest pain or shortness of breath.  No issues at access site.  No issues on monitor.  Heartburn did improve with PPI.    Review of Systems:   12 point review of systems reviewed pertinent for those mentioned HPI    Objective     Vital Signs  Temp:  [97.7 °F (36.5 °C)-98.3 °F (36.8 °C)] 98.1 °F (36.7 °C)  Heart Rate:  [61-92] 66  Resp:  [15-18] 18  BP: (112-150)/(78-91) 119/78  Body mass index is 30.79 kg/m².  No intake or output data in the 24 hours ending 10/03/24 0713  No intake/output data recorded.    Physical Exam:     General Appearance:  Alert, cooperative, in no acute distress   Head:  Normocephalic, without obvious abnormality, atraumatic   Eyes:  Lids and lashes normal, conjunctivae and sclerae normal, no icterus, no pallor, corneas clear, PERRLA   Ears:  Ears appear intact with no abnormalities noted   Throat:  No oral lesions, no thrush, oral mucosa moist   Neck:  No adenopathy, supple, trachea midline, no thyromegaly, no carotid bruit, no JVD   Back:  No kyphosis present, no scoliosis present, no skin lesions, erythema or scars, no tenderness to percussion or palpation, range of motion normal   Lungs:  Clear to auscultation, respirations regular, even and unlabored    Heart:  Regular rhythm and normal rate, normal S1 and S2, no murmur, no gallop, no rub, no click   Chest Wall:  No abnormalities observed   Abdomen:  Normal bowel sounds, no masses, no organomegaly, soft non-tender, non-distended, no guarding, no rebound tenderness   Rectal:  Deferred   Extremities:  Moves all extremities well, no edema, no cyanosis, no redness   Pulses:  Pulses palpable and equal bilaterally   Skin:  No bleeding, bruising or rash   Lymph nodes:  No palpable adenopathy   Neurologic:  Cranial nerves 2 - 12 grossly intact, sensation intact,  "DTR present and equal bilaterally                                                                                Results Review:     I reviewed the patient's new clinical results.      WBC WBC   Date Value Ref Range Status   10/03/2024 11.63 (H) 3.40 - 10.80 10*3/mm3 Final   10/02/2024 7.94 3.40 - 10.80 10*3/mm3 Final      HGB Hemoglobin   Date Value Ref Range Status   10/03/2024 16.4 13.0 - 17.7 g/dL Final   10/02/2024 16.8 13.0 - 17.7 g/dL Final      HCT Hematocrit   Date Value Ref Range Status   10/03/2024 50.3 37.5 - 51.0 % Final   10/02/2024 49.7 37.5 - 51.0 % Final      Platlets No results found for: \"LABPLAT\"     PT/INR:    Protime   Date Value Ref Range Status   10/02/2024 12.5 12.2 - 14.5 Seconds Final   /  INR   Date Value Ref Range Status   10/02/2024 0.92 0.89 - 1.12 Final       Sodium Sodium   Date Value Ref Range Status   10/03/2024 136 136 - 145 mmol/L Final   10/02/2024 138 136 - 145 mmol/L Final      Potassium Potassium   Date Value Ref Range Status   10/03/2024 4.2 3.5 - 5.2 mmol/L Final   10/02/2024 4.1 3.5 - 5.2 mmol/L Final     Comment:     Slight hemolysis detected by analyzer. Result may be falsely elevated.      Chloride Chloride   Date Value Ref Range Status   10/03/2024 102 98 - 107 mmol/L Final   10/02/2024 101 98 - 107 mmol/L Final      Bicarbonate No results found for: \"PLASMABICARB\"   BUN BUN   Date Value Ref Range Status   10/03/2024 11 6 - 20 mg/dL Final   10/02/2024 16 6 - 20 mg/dL Final      Creatinine Creatinine   Date Value Ref Range Status   10/03/2024 1.00 0.76 - 1.27 mg/dL Final   10/02/2024 1.46 (H) 0.76 - 1.27 mg/dL Final      Calcium Calcium   Date Value Ref Range Status   10/03/2024 8.6 8.6 - 10.5 mg/dL Final   10/02/2024 9.3 8.6 - 10.5 mg/dL Final      Magnesium Magnesium   Date Value Ref Range Status   10/03/2024 2.2 1.6 - 2.6 mg/dL Final   10/02/2024 1.8 1.6 - 2.6 mg/dL Final            pH No results found for: \"PHART\"   pO2 No results found for: \"PO2ART\"   pCO2 No " "results found for: \"SCD5WKA\"   HCO3 No results found for: \"LEX0KFT\"     aspirin, 81 mg, Oral, Daily  atorvastatin, 80 mg, Oral, Nightly  metoprolol tartrate, 25 mg, Oral, Q12H  pantoprazole, 40 mg, Oral, Nightly  pharmacy consult - MTM, , Does not apply, Daily  sodium chloride, 10 mL, Intravenous, Q12H  Testosterone Cypionate, 200 mg, Intramuscular, Q14 Days      sodium chloride 0.9 % with KCl 20 mEq, 100 mL/hr, Last Rate: 100 mL/hr (10/03/24 0240)        Medication Review: Reviewed     Assessment & Plan     Patient Active Problem List   Diagnosis Code    Encounter for annual physical exam Z00.00    Erectile dysfunction N52.9    Attention deficit hyperactivity disorder (ADHD), predominantly inattentive type F90.0    Vitamin D deficiency E55.9    Low testosterone in male R79.89    NSTEMI (non-ST elevated myocardial infarction) I21.4       Type II non-ST elevation myocardial infarction  GERD  Hyperlipidemia  Acute kidney injury present on admission, resolved    Plan: Patient be placed on Protonix 40 mg daily.  Patient undergo CT of his chest throughout pulmonary blood given syncope/chest pain.  If unremarkable will discharge home later today with a 14-day patch monitor for further evaluation of arrhythmia.  Will see patient back in 4 weeks.        Rakesh Alvarez MD  10/03/24  07:13 EDT          "

## 2024-10-03 NOTE — PROGRESS NOTES
"Enter Query Response Below      Query Response:     Uncertain, presumed GERD and or arrhthymia          If applicable, please update the problem list.     Patient: Behzad Chi        : 1980  Account: [id number]           Admit Date: 10/2/2024        How to Respond to this query:       a. Click New Note     b. Answer query within the yellow box.                c. Update the Problem List, if applicable.      If you have any questions about this query contact me at: [email or web address]     Dr. Alvarez:    44-year-old presented 10/2 with chest pain. H&P- \"Patient episode where he was bending over on side of bed.  Patient's chest started hurting him and he passed out.\"  History of smoking and family history of premature CAD.  HS troponin T (10/2)- 8, 62, 36.  EKG with nonspecific T wave abnormality.  H&P Notes Non-ST elevation MI.  Heart cath findings - no significant epicardial CAD.  10/3 progress note \"Type II non-ST elevation myocardial infarction\", \"GERD.\" \"Patient be placed on Protonix 40 mg daily.\"     Please clarify the etiology of the patient’s chest pain:    GERD  Other- specify_________    By submitting this query, we are merely seeking further clarification of documentation to accurately reflect all conditions that you are monitoring, evaluating, treating or that extend the hospitalization or utilize additional resources of care. Please utilize your independent clinical judgment when addressing the question(s) above.     This query and your response, once completed, will be entered into the legal medical record.    Sincerely,  Trinity Barroso RN, CCDS  Clinical Documentation Integrity Program     "

## 2024-10-03 NOTE — DISCHARGE SUMMARY
Date of Discharge:  10/3/2024    Discharge Diagnosis: Syncope    Presenting Problem/History of Present Illness  Active Hospital Problems    Diagnosis  POA    **NSTEMI (non-ST elevated myocardial infarction) [I21.4]  Yes      Resolved Hospital Problems   No resolved problems to display.          Hospital Course  Patient is a 44 y.o. male presented with syncope and chest pain.  Patient was found to have elevated cardiac biomarkers as brought to catheterization that was unremarkable.  Echocardiogram was also relatively unremarkable.  Patient ultimately underwent CTA of his chest that was also unremarkable.  Patient was discharged home with a 14-day patch monitor..      Procedures Performed    Procedure(s):  Left Heart Cath  -------------------       Consults:   Consults       No orders found for last 30 day(s).            Pertinent Test Results: Luminal coronary disease dilated right ventricle noted on echo    Condition on Discharge: Stable    Vital Signs  Temp:  [98 °F (36.7 °C)-98.9 °F (37.2 °C)] 98.9 °F (37.2 °C)  Heart Rate:  [66-92] 89  Resp:  [15-18] 18  BP: (112-150)/(78-91) 128/87    Physical Exam:     General Appearance:  Alert, cooperative, in no acute distress   Head:  Normocephalic, without obvious abnormality, atraumatic   Eyes:  Lids and lashes normal, conjunctivae and sclerae normal, no icterus, no pallor, corneas clear, PERRLA   Ears:  Ears appear intact with no abnormalities noted   Throat:  No oral lesions, no thrush, oral mucosa moist   Neck:  No adenopathy, supple, trachea midline, no thyromegaly, no carotid bruit, no JVD   Back:  No kyphosis present, no scoliosis present, no skin lesions, erythema or scars, no tenderness to percussion or palpation, range of motion normal   Lungs:  Clear to auscultation, respirations regular, even and unlabored    Heart:  Regular rhythm and normal rate, normal S1 and S2, no murmur, no gallop, no rub, no click   Chest Wall:  No abnormalities observed   Abdomen:   Normal bowel sounds, no masses, no organomegaly, soft non-tender, non-distended, no guarding, no rebound tenderness   Rectal:  Deferred   Extremities:  Moves all extremities well, no edema, no cyanosis, no redness   Pulses:  Pulses palpable and equal bilaterally   Skin:  No bleeding, bruising or rash   Lymph nodes:  No palpable adenopathy   Neurologic:  Cranial nerves 2 - 12 grossly intact, sensation intact, DTR present and equal bilaterally                                                                               Discharge Disposition  Home or Self Care    Discharge Medications     Discharge Medications        New Medications        Instructions Start Date   aspirin 81 MG chewable tablet   81 mg, Oral, Daily   Start Date: October 4, 2024     atorvastatin 80 MG tablet  Commonly known as: LIPITOR   80 mg, Oral, Nightly      metoprolol tartrate 25 MG tablet  Commonly known as: LOPRESSOR   25 mg, Oral, Every 12 Hours Scheduled      pantoprazole 40 MG EC tablet  Commonly known as: PROTONIX   40 mg, Oral, Nightly             Continue These Medications        Instructions Start Date   amphetamine-dextroamphetamine XR 30 MG 24 hr capsule  Commonly known as: Adderall XR   30 mg, Oral, Every Morning      amphetamine-dextroamphetamine 10 MG tablet  Commonly known as: Adderall   Take 1 pill orally every afternoon      naloxone 4 MG/0.1ML nasal spray  Commonly known as: NARCAN       sildenafil 100 MG tablet  Commonly known as: Viagra   100 mg, Oral, Daily PRN      Sublocade 300 MG/1.5ML solution prefilled syringe  Generic drug: Buprenorphine ER   INJECT 300MG SUBCUTANEOUSLY ONCE MONTHLY      Testosterone Cypionate 200 MG/ML injection  Commonly known as: DEPOTESTOTERONE CYPIONATE   200 mg, Intramuscular, Every 14 Days      vitamin D 1.25 MG (62054 UT) capsule capsule  Commonly known as: ERGOCALCIFEROL   50,000 Units, Oral, Weekly               Discharge Diet: Regular    Activity at Discharge: No lifting more than 5 pounds  for 5 days no soaking wound for 3 days no driving 24 hours    Follow-up Appointments  Future Appointments   Date Time Provider Department Center   10/8/2024  8:15 AM Cristiana Jackson DO [facility]   11/5/2024  9:15 AM Rakesh Alvarez MD [facility]         Test Results Pending at Discharge: Patient will have a monitor placed       Rakesh Alvarez MD  10/03/24  10:12 EDT

## 2024-10-04 ENCOUNTER — TRANSITIONAL CARE MANAGEMENT TELEPHONE ENCOUNTER (OUTPATIENT)
Dept: CALL CENTER | Facility: HOSPITAL | Age: 44
End: 2024-10-04
Payer: COMMERCIAL

## 2024-10-04 NOTE — OUTREACH NOTE
Call Center TCM Note      Flowsheet Row Responses   [facility] patient discharged from? [place]   Does the patient have one of the following disease processes/diagnoses(primary or secondary)? Acute MI (STEMI,NSTEMI)   TCM attempt successful? No   Unsuccessful attempts Attempt 1   Call Status Left message            Lenin Weir RN    10/4/2024, 16:39 EDT

## 2024-10-04 NOTE — OUTREACH NOTE
Call Center TCM Note      Flowsheet Row Responses   [facility] patient discharged from? [place]   Does the patient have one of the following disease processes/diagnoses(primary or secondary)? Acute MI (STEMI,NSTEMI)   TCM attempt successful? No   Unsuccessful attempts Attempt 2            Lenin Weir RN    10/4/2024, 16:54 EDT

## 2024-10-05 ENCOUNTER — TRANSITIONAL CARE MANAGEMENT TELEPHONE ENCOUNTER (OUTPATIENT)
Dept: CALL CENTER | Facility: HOSPITAL | Age: 44
End: 2024-10-05
Payer: COMMERCIAL

## 2024-10-05 NOTE — OUTREACH NOTE
Call Center TCM Note      Flowsheet Row Responses   [facility] patient discharged from? [place]   Does the patient have one of the following disease processes/diagnoses(primary or secondary)? Acute MI (STEMI,NSTEMI)   TCM attempt successful? No   Unsuccessful attempts Attempt 3            Elayne Alcantar RN    10/5/2024, 08:36 EDT

## 2024-10-08 ENCOUNTER — LAB (OUTPATIENT)
Dept: LAB | Facility: HOSPITAL | Age: 44
End: 2024-10-08
Payer: COMMERCIAL

## 2024-10-08 ENCOUNTER — OFFICE VISIT (OUTPATIENT)
Dept: FAMILY MEDICINE CLINIC | Facility: CLINIC | Age: 44
End: 2024-10-08
Payer: COMMERCIAL

## 2024-10-08 VITALS
OXYGEN SATURATION: 97 % | HEART RATE: 85 BPM | HEIGHT: 65 IN | BODY MASS INDEX: 32.69 KG/M2 | DIASTOLIC BLOOD PRESSURE: 78 MMHG | WEIGHT: 196.2 LBS | SYSTOLIC BLOOD PRESSURE: 124 MMHG | TEMPERATURE: 97.7 F

## 2024-10-08 DIAGNOSIS — F90.0 ATTENTION DEFICIT HYPERACTIVITY DISORDER (ADHD), PREDOMINANTLY INATTENTIVE TYPE: ICD-10-CM

## 2024-10-08 DIAGNOSIS — R40.0 DAYTIME SOMNOLENCE: ICD-10-CM

## 2024-10-08 DIAGNOSIS — I21.4 NSTEMI (NON-ST ELEVATED MYOCARDIAL INFARCTION): Primary | ICD-10-CM

## 2024-10-08 DIAGNOSIS — I51.7 LEFT VENTRICULAR HYPERTROPHY: ICD-10-CM

## 2024-10-08 DIAGNOSIS — R79.89 LOW TESTOSTERONE IN MALE: ICD-10-CM

## 2024-10-08 DIAGNOSIS — E55.9 VITAMIN D DEFICIENCY: ICD-10-CM

## 2024-10-08 LAB
25(OH)D3 SERPL-MCNC: 35.6 NG/ML (ref 30–100)
ALBUMIN SERPL-MCNC: 3.9 G/DL (ref 3.5–5.2)
ALBUMIN/GLOB SERPL: 1.4 G/DL
ALP SERPL-CCNC: 108 U/L (ref 39–117)
ALT SERPL W P-5'-P-CCNC: 35 U/L (ref 1–41)
ANION GAP SERPL CALCULATED.3IONS-SCNC: 7.4 MMOL/L (ref 5–15)
AST SERPL-CCNC: 23 U/L (ref 1–40)
BASOPHILS # BLD AUTO: 0.06 10*3/MM3 (ref 0–0.2)
BASOPHILS NFR BLD AUTO: 0.8 % (ref 0–1.5)
BILIRUB SERPL-MCNC: 0.3 MG/DL (ref 0–1.2)
BUN SERPL-MCNC: 12 MG/DL (ref 6–20)
BUN/CREAT SERPL: 9.5 (ref 7–25)
CALCIUM SPEC-SCNC: 9.1 MG/DL (ref 8.6–10.5)
CHLORIDE SERPL-SCNC: 103 MMOL/L (ref 98–107)
CO2 SERPL-SCNC: 27.6 MMOL/L (ref 22–29)
CREAT SERPL-MCNC: 1.26 MG/DL (ref 0.76–1.27)
DEPRECATED RDW RBC AUTO: 41.5 FL (ref 37–54)
EGFRCR SERPLBLD CKD-EPI 2021: 72.1 ML/MIN/1.73
EOSINOPHIL # BLD AUTO: 0.08 10*3/MM3 (ref 0–0.4)
EOSINOPHIL NFR BLD AUTO: 1.1 % (ref 0.3–6.2)
ERYTHROCYTE [DISTWIDTH] IN BLOOD BY AUTOMATED COUNT: 12.7 % (ref 12.3–15.4)
GLOBULIN UR ELPH-MCNC: 2.8 GM/DL
GLUCOSE SERPL-MCNC: 157 MG/DL (ref 65–99)
HCT VFR BLD AUTO: 50.1 % (ref 37.5–51)
HGB BLD-MCNC: 16.4 G/DL (ref 13–17.7)
IMM GRANULOCYTES # BLD AUTO: 0.04 10*3/MM3 (ref 0–0.05)
IMM GRANULOCYTES NFR BLD AUTO: 0.6 % (ref 0–0.5)
LYMPHOCYTES # BLD AUTO: 1.88 10*3/MM3 (ref 0.7–3.1)
LYMPHOCYTES NFR BLD AUTO: 26.3 % (ref 19.6–45.3)
MCH RBC QN AUTO: 29.4 PG (ref 26.6–33)
MCHC RBC AUTO-ENTMCNC: 32.7 G/DL (ref 31.5–35.7)
MCV RBC AUTO: 89.9 FL (ref 79–97)
MONOCYTES # BLD AUTO: 0.61 10*3/MM3 (ref 0.1–0.9)
MONOCYTES NFR BLD AUTO: 8.5 % (ref 5–12)
NEUTROPHILS NFR BLD AUTO: 4.48 10*3/MM3 (ref 1.7–7)
NEUTROPHILS NFR BLD AUTO: 62.7 % (ref 42.7–76)
NRBC BLD AUTO-RTO: 0 /100 WBC (ref 0–0.2)
PLATELET # BLD AUTO: 256 10*3/MM3 (ref 140–450)
PMV BLD AUTO: 10.1 FL (ref 6–12)
POTASSIUM SERPL-SCNC: 4.6 MMOL/L (ref 3.5–5.2)
PROT SERPL-MCNC: 6.7 G/DL (ref 6–8.5)
RBC # BLD AUTO: 5.57 10*6/MM3 (ref 4.14–5.8)
SODIUM SERPL-SCNC: 138 MMOL/L (ref 136–145)
TSH SERPL DL<=0.05 MIU/L-ACNC: 2.05 UIU/ML (ref 0.27–4.2)
VIT B12 BLD-MCNC: 483 PG/ML (ref 211–946)
WBC NRBC COR # BLD AUTO: 7.15 10*3/MM3 (ref 3.4–10.8)

## 2024-10-08 PROCEDURE — 99214 OFFICE O/P EST MOD 30 MIN: CPT | Performed by: FAMILY MEDICINE

## 2024-10-08 PROCEDURE — 36415 COLL VENOUS BLD VENIPUNCTURE: CPT

## 2024-10-08 PROCEDURE — 1160F RVW MEDS BY RX/DR IN RCRD: CPT | Performed by: FAMILY MEDICINE

## 2024-10-08 PROCEDURE — 80050 GENERAL HEALTH PANEL: CPT

## 2024-10-08 PROCEDURE — 1126F AMNT PAIN NOTED NONE PRSNT: CPT | Performed by: FAMILY MEDICINE

## 2024-10-08 PROCEDURE — 82607 VITAMIN B-12: CPT

## 2024-10-08 PROCEDURE — 82306 VITAMIN D 25 HYDROXY: CPT

## 2024-10-08 PROCEDURE — 1159F MED LIST DOCD IN RCRD: CPT | Performed by: FAMILY MEDICINE

## 2024-10-08 PROCEDURE — 84402 ASSAY OF FREE TESTOSTERONE: CPT

## 2024-10-08 PROCEDURE — 84403 ASSAY OF TOTAL TESTOSTERONE: CPT

## 2024-10-08 RX ORDER — ATORVASTATIN CALCIUM 40 MG/1
40 TABLET, FILM COATED ORAL NIGHTLY
COMMUNITY
Start: 2024-10-03

## 2024-10-08 RX ORDER — DEXTROAMPHETAMINE SACCHARATE, AMPHETAMINE ASPARTATE MONOHYDRATE, DEXTROAMPHETAMINE SULFATE AND AMPHETAMINE SULFATE 5; 5; 5; 5 MG/1; MG/1; MG/1; MG/1
20 CAPSULE, EXTENDED RELEASE ORAL EVERY MORNING
Qty: 30 CAPSULE | Refills: 0 | Status: SHIPPED | OUTPATIENT
Start: 2024-10-08

## 2024-10-08 RX ORDER — IBUPROFEN 800 MG/1
800 TABLET, FILM COATED ORAL EVERY 6 HOURS PRN
COMMUNITY
Start: 2024-08-30

## 2024-10-08 NOTE — PROGRESS NOTES
Subjective     Chief Complaint  ADHD (Follow up)    Subjective          Behzad Chi is a 44 y.o. male who presents today to [facility] for follow up.    HPI:   History of Present Illness    Behzad is a very pleasant 44-year-old male presents today to follow-up on ADHD and low testosterone.     His current prescription is Adderall 30 mg extended release and Adderall 10 mg daily in the afternoon. He feels stable with this combination of medications.      He has also been stable on his testosterone dose as well and is able to self administer at home. On labs, his testosterone was elevated on last check, but he had just administered an injection two days prior     Of note, He had a hospital stay for a episode of syncope and chest pain.  He ultimately underwent heart cath that was unremarkable.  CT of the chest was also unremarkable.  He has followed with cardiology and plan is for Holter monitor. During the episode, he felt as if his heart was beating very hard and there was question if he had a full syncopal episode. He has had one episode of sharp pain over the last few days. On echo, it was noted that he had signs of mild left ventricular hypertrophy     The following portions of the patient's history were reviewed and updated as appropriate: allergies, current medications, past family history, past medical history, past social history, past surgical history and problem list.    Objective     Objective     Allergy:   No Known Allergies     Current Medications:   Current Outpatient Medications   Medication Sig Dispense Refill    aspirin 81 MG chewable tablet Chew 1 tablet Daily. 30 tablet 11    atorvastatin (LIPITOR) 40 MG tablet Take 1 tablet by mouth Every Night.      ibuprofen (ADVIL,MOTRIN) 800 MG tablet Take 1 tablet by mouth Every 6 (Six) Hours As Needed.      metoprolol tartrate (LOPRESSOR) 25 MG tablet Take 1 tablet by mouth Every 12 (Twelve) Hours. 60 tablet 11     naloxone (NARCAN) 4 MG/0.1ML nasal spray       pantoprazole (PROTONIX) 40 MG EC tablet Take 1 tablet by mouth Every Night. 30 tablet 11    sildenafil (Viagra) 100 MG tablet Take 1 tablet by mouth Daily As Needed for Erectile Dysfunction. 30 tablet 2    Sublocade 300 MG/1.5ML solution prefilled syringe INJECT 300MG SUBCUTANEOUSLY ONCE MONTHLY      Testosterone Cypionate (DEPOTESTOTERONE CYPIONATE) 200 MG/ML injection Inject 1 mL into the appropriate muscle as directed by prescriber Every 14 (Fourteen) Days. 2 mL 5    vitamin D (ERGOCALCIFEROL) 1.25 MG (47539 UT) capsule capsule Take 1 capsule by mouth 1 (One) Time Per Week. 12 capsule 1    amphetamine-dextroamphetamine XR (Adderall XR) 20 MG 24 hr capsule Take 1 capsule by mouth Every Morning 30 capsule 0     No current facility-administered medications for this visit.       Past Medical History:  Past Medical History:   Diagnosis Date    ADHD     Depression     Erectile dysfunction 01/2022    PTSD (post-traumatic stress disorder)        Past Surgical History:  Past Surgical History:   Procedure Laterality Date    APPENDECTOMY      CARDIAC CATHETERIZATION N/A 10/2/2024    Procedure: Left Heart Cath;  Surgeon: Rakesh Alvarez MD;  Location: [facility] CATH INVASIVE LOCATION;  Service: Cardiology;  Laterality: N/A;    CIRCUMCISION      COLONOSCOPY      Need to schedule one       Social History:  Social History     Socioeconomic History    Marital status: Legally    Tobacco Use    Smoking status: Every Day     Current packs/day: 0.50     Average packs/day: 0.5 packs/day for 21.0 years (10.5 ttl pk-yrs)     Types: Cigarettes, Electronic Cigarette     Passive exposure: Current    Smokeless tobacco: Never    Tobacco comments:     Quit for 7 years and started back 6 months ago   Vaping Use    Vaping status: Every Day    Substances: Nicotine, Flavoring    Devices: Disposable, Pre-filled pod    Passive vaping exposure: Yes   Substance and Sexual Activity     "Alcohol use: Not Currently    Drug use: Not Currently    Sexual activity: Yes     Partners: Female     Birth control/protection: None       Family History:  Family History   Problem Relation Age of Onset    Diabetes Mother     Cancer Father     Other Father     Cancer Paternal Grandfather        Vital Signs:   /78   Pulse 85   Temp 97.7 °F (36.5 °C) (Infrared)   Ht 165.1 cm (65\")   Wt 89 kg (196 lb 3.2 oz)   SpO2 97%   BMI 32.65 kg/m²      Physical Exam:  Physical Exam  Vitals reviewed.   Constitutional:       Appearance: He is not ill-appearing.   Eyes:      Pupils: Pupils are equal, round, and reactive to light.   Cardiovascular:      Rate and Rhythm: Normal rate.      Pulses: Normal pulses.   Pulmonary:      Effort: Pulmonary effort is normal.      Breath sounds: Normal breath sounds.   Neurological:      General: No focal deficit present.      Mental Status: He is alert. Mental status is at baseline.   Psychiatric:         Mood and Affect: Mood normal.         Behavior: Behavior normal.         Thought Content: Thought content normal.         Judgment: Judgment normal.               PHQ-9 Score  PHQ-9 Total Score:       Lab Review  Admission on 10/02/2024, Discharged on 10/03/2024   Component Date Value Ref Range Status    QT Interval 10/02/2024 354  ms Final    QTC Interval 10/02/2024 365  ms Final    Glucose 10/02/2024 167 (H)  65 - 99 mg/dL Final    BUN 10/02/2024 16  6 - 20 mg/dL Final    Creatinine 10/02/2024 1.46 (H)  0.76 - 1.27 mg/dL Final    Sodium 10/02/2024 138  136 - 145 mmol/L Final    Potassium 10/02/2024 4.1  3.5 - 5.2 mmol/L Final    Slight hemolysis detected by analyzer. Result may be falsely elevated.    Chloride 10/02/2024 101  98 - 107 mmol/L Final    CO2 10/02/2024 29.0  22.0 - 29.0 mmol/L Final    Calcium 10/02/2024 9.3  8.6 - 10.5 mg/dL Final    Total Protein 10/02/2024 6.5  6.0 - 8.5 g/dL Final    Albumin 10/02/2024 4.1  3.5 - 5.2 g/dL Final    ALT (SGPT) 10/02/2024 56 (H)  1 " - 41 U/L Final    AST (SGOT) 10/02/2024 30  1 - 40 U/L Final    Alkaline Phosphatase 10/02/2024 99  39 - 117 U/L Final    Total Bilirubin 10/02/2024 0.4  0.0 - 1.2 mg/dL Final    Globulin 10/02/2024 2.4  gm/dL Final    Calculated Result    A/G Ratio 10/02/2024 1.7  g/dL Final    BUN/Creatinine Ratio 10/02/2024 11.0  7.0 - 25.0 Final    Anion Gap 10/02/2024 8.0  5.0 - 15.0 mmol/L Final    eGFR 10/02/2024 60.4  >60.0 mL/min/1.73 Final    Magnesium 10/02/2024 1.8  1.6 - 2.6 mg/dL Final    HS Troponin T 10/02/2024 8  <22 ng/L Final    Extra Tube 10/02/2024 Hold for add-ons.   Final    Auto resulted.    Extra Tube 10/02/2024 hold for add-on   Final    Auto resulted    Extra Tube 10/02/2024 Hold for add-ons.   Final    Auto resulted.    Extra Tube 10/02/2024 Hold for add-ons.   Final    Auto resulted.    Extra Tube 10/02/2024 Hold for add-ons.   Final    Auto resulted    WBC 10/02/2024 7.94  3.40 - 10.80 10*3/mm3 Final    RBC 10/02/2024 5.59  4.14 - 5.80 10*6/mm3 Final    Hemoglobin 10/02/2024 16.8  13.0 - 17.7 g/dL Final    Hematocrit 10/02/2024 49.7  37.5 - 51.0 % Final    MCV 10/02/2024 88.9  79.0 - 97.0 fL Final    MCH 10/02/2024 30.1  26.6 - 33.0 pg Final    MCHC 10/02/2024 33.8  31.5 - 35.7 g/dL Final    RDW 10/02/2024 12.7  12.3 - 15.4 % Final    RDW-SD 10/02/2024 41.3  37.0 - 54.0 fl Final    MPV 10/02/2024 10.1  6.0 - 12.0 fL Final    Platelets 10/02/2024 209  140 - 450 10*3/mm3 Final    Neutrophil % 10/02/2024 55.8  42.7 - 76.0 % Final    Lymphocyte % 10/02/2024 34.8  19.6 - 45.3 % Final    Monocyte % 10/02/2024 7.3  5.0 - 12.0 % Final    Eosinophil % 10/02/2024 1.1  0.3 - 6.2 % Final    Basophil % 10/02/2024 0.5  0.0 - 1.5 % Final    Immature Grans % 10/02/2024 0.5  0.0 - 0.5 % Final    Neutrophils, Absolute 10/02/2024 4.43  1.70 - 7.00 10*3/mm3 Final    Lymphocytes, Absolute 10/02/2024 2.76  0.70 - 3.10 10*3/mm3 Final    Monocytes, Absolute 10/02/2024 0.58  0.10 - 0.90 10*3/mm3 Final    Eosinophils, Absolute  10/02/2024 0.09  0.00 - 0.40 10*3/mm3 Final    Basophils, Absolute 10/02/2024 0.04  0.00 - 0.20 10*3/mm3 Final    Immature Grans, Absolute 10/02/2024 0.04  0.00 - 0.05 10*3/mm3 Final    nRBC 10/02/2024 0.0  0.0 - 0.2 /100 WBC Final    HS Troponin T 10/02/2024 62 (C)  <22 ng/L Final    QT Interval 10/02/2024 322  ms Final    QTC Interval 10/02/2024 367  ms Final    HS Troponin T 10/02/2024 36 (H)  <22 ng/L Final    Troponin T Delta 10/02/2024 -26 (L)  >=-4 - <+4 ng/L Final    Heparin Anti-Xa (UFH) 10/02/2024 0.10 (L)  0.30 - 0.70 IU/ml Final    Protime 10/02/2024 12.5  12.2 - 14.5 Seconds Final    INR 10/02/2024 0.92  0.89 - 1.12 Final    PTT 10/02/2024 26.6 (L)  60.0 - 90.0 seconds Final    LVIDd 10/02/2024 4.6  cm Final    LVIDs 10/02/2024 3.0  cm Final    IVSd 10/02/2024 0.90  cm Final    LVPWd 10/02/2024 1.20  cm Final    IVS/LVPW 10/02/2024 0.75  cm Final    LVOT diam 10/02/2024 2.00  cm Final    MV E max kirk 10/02/2024 75.4  cm/sec Final    MV A max kirk 10/02/2024 50.4  cm/sec Final    MV E/A 10/02/2024 1.50   Final    Med Peak E' Kirk 10/02/2024 10.1  cm/sec Final    Lat Peak E' Kirk 10/02/2024 13.3  cm/sec Final    Avg E/e' ratio 10/02/2024 6.44   Final    RV Base 10/02/2024 4.8  cm Final    RV Mid 10/02/2024 3.8  cm Final    RV Length 10/02/2024 8.3  cm Final    TAPSE (>1.6) 10/02/2024 2.6  cm Final    RV S' 10/02/2024 12.6  cm/sec Final    LA dimension (2D)  10/02/2024 3.2  cm Final    LV V1 max 10/02/2024 73.6  cm/sec Final    LV V1 max PG 10/02/2024 2.17  mmHg Final    LV V1 mean PG 10/02/2024 0.90  mmHg Final    LV V1 VTI 10/02/2024 14.1  cm Final    Ao pk kirk 10/02/2024 133.0  cm/sec Final    Ao max PG 10/02/2024 7.1  mmHg Final    MV P1/2t 10/02/2024 67.0  msec Final    RV V1 max PG 10/02/2024 1.08  mmHg Final    RV V1 max 10/02/2024 51.7  cm/sec Final    RV V1 VTI 10/02/2024 13.0  cm Final    PA V2 max 10/02/2024 108.7  cm/sec Final    PA acc time 10/02/2024 0.04  sec Final    PI end-d kirk 10/02/2024  108.1  cm/sec Final    Ao root diam 10/02/2024 2.6  cm Final    Ascending aorta 10/02/2024 2.6  cm Final    EF(MOD-bp) 10/02/2024 59.0  % Final    Echo EF Estimated 10/02/2024 59.0  % Final    IVRT 10/02/2024 79.0  ms Final    LA ESV Index (BP) 10/02/2024 26.0  ml/m2 Final    Ao mean PG 10/02/2024 4  mmHg Final    Ao V2 VTI 10/02/2024 22.9  cm Final    OMEGA(I,D) 10/02/2024 193.0  cm2 Final    MVA(P1/2t) 10/02/2024 3.30  cm2 Final    MV dec slope 10/02/2024 424.00  cm/sec2 Final    RAP systole 10/02/2024 8  mmHg Final    PAPd(PI edV) 10/02/2024 5  mmHg Final    Total Cholesterol 10/03/2024 222 (H)  0 - 200 mg/dL Final    Triglycerides 10/03/2024 282 (H)  0 - 150 mg/dL Final    HDL Cholesterol 10/03/2024 36 (L)  40 - 60 mg/dL Final    LDL Cholesterol  10/03/2024 135 (H)  0 - 100 mg/dL Final    VLDL Cholesterol 10/03/2024 51 (H)  5 - 40 mg/dL Final    LDL/HDL Ratio 10/03/2024 3.60   Final    Magnesium 10/03/2024 2.2  1.6 - 2.6 mg/dL Final    TSH 10/03/2024 2.150  0.270 - 4.200 uIU/mL Final    Hemoglobin A1C 10/03/2024 5.40  4.80 - 5.60 % Final    WBC 10/03/2024 11.63 (H)  3.40 - 10.80 10*3/mm3 Final    RBC 10/03/2024 5.44  4.14 - 5.80 10*6/mm3 Final    Hemoglobin 10/03/2024 16.4  13.0 - 17.7 g/dL Final    Hematocrit 10/03/2024 50.3  37.5 - 51.0 % Final    MCV 10/03/2024 92.5  79.0 - 97.0 fL Final    MCH 10/03/2024 30.1  26.6 - 33.0 pg Final    MCHC 10/03/2024 32.6  31.5 - 35.7 g/dL Final    RDW 10/03/2024 12.9  12.3 - 15.4 % Final    RDW-SD 10/03/2024 43.2  37.0 - 54.0 fl Final    MPV 10/03/2024 10.2  6.0 - 12.0 fL Final    Platelets 10/03/2024 213  140 - 450 10*3/mm3 Final    Glucose 10/03/2024 99  65 - 99 mg/dL Final    BUN 10/03/2024 11  6 - 20 mg/dL Final    Creatinine 10/03/2024 1.00  0.76 - 1.27 mg/dL Final    Sodium 10/03/2024 136  136 - 145 mmol/L Final    Potassium 10/03/2024 4.2  3.5 - 5.2 mmol/L Final    Chloride 10/03/2024 102  98 - 107 mmol/L Final    CO2 10/03/2024 24.0  22.0 - 29.0 mmol/L Final    Calcium  10/03/2024 8.6  8.6 - 10.5 mg/dL Final    BUN/Creatinine Ratio 10/03/2024 11.0  7.0 - 25.0 Final    Anion Gap 10/03/2024 10.0  5.0 - 15.0 mmol/L Final    eGFR 10/03/2024 95.2  >60.0 mL/min/1.73 Final        Radiology Results  CT Angiogram Chest    Result Date: 10/3/2024  Impression: Impression: No evidence of pulmonary embolism No acute pulmonary process Electronically Signed: Jose Cagle MD  10/3/2024 8:58 AM EDT  Workstation ID: [id number]    XR Chest 1 View    Result Date: 10/2/2024  Impression: Impression: 1.No acute radiographic abnormality is identified. Electronically Signed: Juan Jenkins MD  10/2/2024 3:28 PM EDT  Workstation ID: [id number]    CT Head Without Contrast    Result Date: 10/2/2024  Impression: Impression: No acute intracranial findings. Electronically Signed: Danny Zamora MD  10/2/2024 11:16 AM EDT  Workstation ID: [id number]      Assessment / Plan         Assessment and Plan   Diagnoses and all orders for this visit:    1. NSTEMI (non-ST elevated myocardial infarction) (Primary)  Assessment & Plan:  - NSTEMI - no blockage on LHC.   - Questioned if related to arrhythmia       2. Low testosterone in male  Assessment & Plan:  - Levels ordered with labs   - Tolerating injections well without issue.       3. Attention deficit hyperactivity disorder (ADHD), predominantly inattentive type  Assessment & Plan:  Psychological condition is stable.  Continue current treatment regimen.  Psychological condition  will be reassessed in 4 weeks.    Starting taper from stimulant due to recent cardiac events. Stopping IR, taper extended release to 20 mg daily.     The patient has read and signed the [facility] Controlled Substance Contract.  I will continue to see patient for regular follow up appointments.  They are well controlled on their medication.  TOM is updated every 3 months. The patient is aware of the potential for addiction and dependence.     Orders:  -     amphetamine-dextroamphetamine XR  (Adderall XR) 20 MG 24 hr capsule; Take 1 capsule by mouth Every Morning  Dispense: 30 capsule; Refill: 0    4. Left ventricular hypertrophy  Assessment & Plan:  - Noted on echo during hospital stay. Referring for sleep study for further evaluation     Orders:  -     Ambulatory Referral to Sleep Medicine    5. Daytime somnolence  -     Ambulatory Referral to Sleep Medicine  -     TSH Rfx On Abnormal To Free T4; Future  -     Vitamin B12; Future  -     Vitamin D,25-Hydroxy; Future  -     Testosterone; Future        Discussed possible differential diagnoses, testing, treatment, recommended non-pharmacological interventions, risks, warning signs to monitor for that would indicate need for follow-up in clinic or ER. If no improvement with these regimens or you have new or worsening symptoms follow-up. Patient verbalizes understanding and agreement with plan of care. Denies further needs or concerns.     Patient was given instructions and counseling regarding her condition and for health maintenance advised.         Health Maintenance  Health Maintenance:   Health Maintenance Due   Topic Date Due    ANNUAL PHYSICAL  08/22/2024        Meds ordered during this visit  New Medications Ordered This Visit   Medications    amphetamine-dextroamphetamine XR (Adderall XR) 20 MG 24 hr capsule     Sig: Take 1 capsule by mouth Every Morning     Dispense:  30 capsule     Refill:  0       Meds stopped during this visit:  Medications Discontinued During This Encounter   Medication Reason    atorvastatin (LIPITOR) 80 MG tablet Dose adjustment    amphetamine-dextroamphetamine (Adderall) 10 MG tablet     amphetamine-dextroamphetamine XR (Adderall XR) 30 MG 24 hr capsule         Visit Diagnoses    ICD-10-CM ICD-9-CM   1. NSTEMI (non-ST elevated myocardial infarction)  I21.4 410.70   2. Low testosterone in male  R79.89 790.99   3. Attention deficit hyperactivity disorder (ADHD), predominantly inattentive type  F90.0 314.00   4. Left  ventricular hypertrophy  I51.7 429.3   5. Daytime somnolence  R40.0 780.54       Patient was given instructions and counseling regarding his condition or for health maintenance advice. Please see specific information pulled into the AVS if appropriate.     Follow Up   Return in about 4 weeks (around 11/5/2024) for followup ADHD .          This document has been electronically signed by Cristiana Jackson DO   October 8, 2024 08:54 EDT    Dictated Utilizing Dragon Dictation: Part of this note may be an electronic transcription/translation of spoken language to printed text using the Dragon Dictation System.    Cristiana Jackson D.O.  [facility]

## 2024-10-13 LAB
TESTOST FREE SERPL-MCNC: 39.6 PG/ML (ref 6.8–21.5)
TESTOST SERPL-MCNC: 1369 NG/DL (ref 264–916)

## 2024-11-05 ENCOUNTER — OFFICE VISIT (OUTPATIENT)
Dept: CARDIOLOGY | Facility: CLINIC | Age: 44
End: 2024-11-05
Payer: COMMERCIAL

## 2024-11-05 ENCOUNTER — PATIENT ROUNDING (BHMG ONLY) (OUTPATIENT)
Dept: CARDIOLOGY | Facility: CLINIC | Age: 44
End: 2024-11-05
Payer: COMMERCIAL

## 2024-11-05 VITALS
HEART RATE: 72 BPM | WEIGHT: 182.4 LBS | OXYGEN SATURATION: 97 % | HEIGHT: 65 IN | SYSTOLIC BLOOD PRESSURE: 110 MMHG | BODY MASS INDEX: 30.39 KG/M2 | DIASTOLIC BLOOD PRESSURE: 60 MMHG

## 2024-11-05 DIAGNOSIS — R55 SYNCOPE AND COLLAPSE: ICD-10-CM

## 2024-11-05 DIAGNOSIS — I21.4 NSTEMI (NON-ST ELEVATED MYOCARDIAL INFARCTION): Primary | ICD-10-CM

## 2024-11-05 DIAGNOSIS — I51.7 LEFT VENTRICULAR HYPERTROPHY: ICD-10-CM

## 2024-11-05 PROCEDURE — 99214 OFFICE O/P EST MOD 30 MIN: CPT | Performed by: INTERNAL MEDICINE

## 2024-11-05 PROCEDURE — 1160F RVW MEDS BY RX/DR IN RCRD: CPT | Performed by: INTERNAL MEDICINE

## 2024-11-05 PROCEDURE — 1159F MED LIST DOCD IN RCRD: CPT | Performed by: INTERNAL MEDICINE

## 2024-11-05 NOTE — PROGRESS NOTES
[facility]  Office Note  Behzad Chi  1980  [address] [address]     VISIT DATE:  11/05/24    PCP: Cristiana Jackson DO  [address] [address]    CC: Syncope  Chief Complaint   Patient presents with    hfu        PROBLEM LIST:    Syncope  Non-ST elevation myocardial infarction type II with unremarkable catheterization October 2024  Relatively unremarkable echocardiogram October 2024    Allergies  No Known Allergies    Current Medications    Current Outpatient Medications:     amphetamine-dextroamphetamine XR (Adderall XR) 20 MG 24 hr capsule, Take 1 capsule by mouth Every Morning, Disp: 30 capsule, Rfl: 0    aspirin 81 MG chewable tablet, Chew 1 tablet Daily., Disp: 30 tablet, Rfl: 11    atorvastatin (LIPITOR) 40 MG tablet, Take 1 tablet by mouth Every Night., Disp: , Rfl:     ibuprofen (ADVIL,MOTRIN) 800 MG tablet, Take 1 tablet by mouth Every 6 (Six) Hours As Needed., Disp: , Rfl:     metoprolol tartrate (LOPRESSOR) 25 MG tablet, Take 1 tablet by mouth Every 12 (Twelve) Hours., Disp: 60 tablet, Rfl: 11    naloxone (NARCAN) 4 MG/0.1ML nasal spray, , Disp: , Rfl:     pantoprazole (PROTONIX) 40 MG EC tablet, Take 1 tablet by mouth Every Night., Disp: 30 tablet, Rfl: 11    sildenafil (Viagra) 100 MG tablet, Take 1 tablet by mouth Daily As Needed for Erectile Dysfunction., Disp: 30 tablet, Rfl: 2    Sublocade 300 MG/1.5ML solution prefilled syringe, INJECT 300MG SUBCUTANEOUSLY ONCE MONTHLY, Disp: , Rfl:     Testosterone Cypionate (DEPOTESTOTERONE CYPIONATE) 200 MG/ML injection, Inject 1 mL into the appropriate muscle as directed by prescriber Every 14 (Fourteen) Days., Disp: 2 mL, Rfl: 5    vitamin D (ERGOCALCIFEROL) 1.25 MG (18103 UT) capsule capsule, Take 1 capsule by mouth 1 (One) Time Per Week., Disp: 12 capsule, Rfl: 1     History of Present Illness   Behzad Chi is a 44 y.o. year old male who presents for consult from No ref. provider found  "for evaluation of syncope.  Patient's had no further syncopal events.  No real chest pain pressure discomfort.  Has not been checking his blood pressure at home.  He is down 15 pounds however.  No syncope as stated.  No even real lightheadedness.  Did have 1 episode of his heart racing.  States this lasted for about 15 minutes and went away by itself ultimately.  Patient has been active back at work without issues.  No issues at access site from procedures.  Patient otherwise did send in his monitor.  Patient states he had no events with the monitor on.    Pt denies any chest pain, dyspnea at rest, dyspnea on exertion, orthopnea, PND, palpitations, lower extremity edema, or claudication. Pt denies history of CHF, DVT, PE, MI, CVA, TIA, or rheumatic fever.     SOCIAL HX  Social History     Socioeconomic History    Marital status: Legally    Tobacco Use    Smoking status: Every Day     Current packs/day: 0.50     Average packs/day: 0.5 packs/day for 21.0 years (10.5 ttl pk-yrs)     Types: Cigarettes, Electronic Cigarette     Passive exposure: Current    Smokeless tobacco: Never    Tobacco comments:     Quit for 7 years and started back 6 months ago   Vaping Use    Vaping status: Every Day    Substances: Nicotine, Flavoring    Devices: Disposable, Pre-filled pod    Passive vaping exposure: Yes   Substance and Sexual Activity    Alcohol use: Not Currently    Drug use: Not Currently    Sexual activity: Yes     Partners: Female     Birth control/protection: None       FAMILY HX  Family History   Problem Relation Age of Onset    Diabetes Mother     Cancer Father     Other Father     Cancer Paternal Grandfather        Vitals:    11/05/24 0956   BP: 110/60   BP Location: Right arm   Patient Position: Sitting   Cuff Size: Adult   Pulse: 72   SpO2: 97%   Weight: 82.7 kg (182 lb 6.4 oz)   Height: 165.1 cm (65\")     Body mass index is 30.35 kg/m².     PHYSICAL EXAMINATION:  Vitals and nursing note reviewed. "   Constitutional:       Appearance: Healthy appearance. Not in distress.   Eyes:      Conjunctiva/sclera: Conjunctivae normal.      Pupils: Pupils are equal, round, and reactive to light.   HENT:      Nose: Nose normal.    Mouth/Throat:      Pharynx: Oropharynx is clear.   Neck:      Vascular: JVD normal.   Pulmonary:      Effort: Pulmonary effort is normal.      Breath sounds: Normal breath sounds. No wheezing. No rhonchi. No rales.   Cardiovascular:      PMI at left midclavicular line. Normal rate. Regular rhythm. Normal S1. Normal S2.       Murmurs: There is no murmur.      No gallop.  No click. No rub.   Pulses:     Intact distal pulses.   Abdominal:      General: Bowel sounds are normal.      Palpations: Abdomen is soft.      Tenderness: There is no abdominal tenderness.   Musculoskeletal: Normal range of motion.         General: No tenderness.      Cervical back: Normal range of motion. Skin:     General: Skin is warm and dry.   Neurological:      General: No focal deficit present.      Mental Status: Alert and oriented to person, place and time.      Cranial Nerves: Cranial nerves 2-12 are intact.         Diagnostic Data:  Lab Results   Component Value Date    TRIG 282 (H) 10/03/2024    HDL 36 (L) 10/03/2024     Lab Results   Component Value Date    GLUCOSE 157 (H) 10/08/2024    BUN 12 10/08/2024    CREATININE 1.26 10/08/2024     10/08/2024    K 4.6 10/08/2024     10/08/2024    CO2 27.6 10/08/2024     Lab Results   Component Value Date    HGBA1C 5.40 10/03/2024     Lab Results   Component Value Date    WBC 7.15 10/08/2024    HGB 16.4 10/08/2024    HCT 50.1 10/08/2024     10/08/2024       Procedures    Advance Care Planning   ACP discussion was declined by the patient. Patient does not have an advance directive, declines further assistance.         ASSESSMENT:  Diagnoses and all orders for this visit:    1. NSTEMI (non-ST elevated myocardial infarction) (Primary)    2. Left ventricular  hypertrophy    3. Syncope and collapse        PLAN:    Continue current medical therapy at this time  Await monitor to be downloaded so can review  Discussed increasing activity level/further weight loss  Cardiac reassurance given to patient at this time    Return in about 4 months (around 3/5/2025).     Rakesh Alvarez MD

## 2024-11-05 NOTE — PROGRESS NOTES
November 5, 2024    Hello, may I speak with Behzad Chi?    My name is SHARMILA      I am  with [facility] CARDIOLOGY  [address]  [address]  [place] [place] [address] [phone].    Before we get started may I verify your date of birth? 1980    I am calling to officially welcome you to our practice and ask about your recent visit. Is this a good time to talk? yes    Tell me about your visit with us. What things went well?  EVERYTHING       We're always looking for ways to make our patients' experiences even better. Do you have recommendations on ways we may improve?  no    Overall were you satisfied with your first visit to our practice? yes       I appreciate you taking the time to speak with me today. Is there anything else I can do for you? no      Thank you, and have a great day.

## 2024-11-06 ENCOUNTER — OFFICE VISIT (OUTPATIENT)
Dept: FAMILY MEDICINE CLINIC | Facility: CLINIC | Age: 44
End: 2024-11-06
Payer: COMMERCIAL

## 2024-11-06 VITALS
RESPIRATION RATE: 20 BRPM | BODY MASS INDEX: 30.25 KG/M2 | HEART RATE: 80 BPM | DIASTOLIC BLOOD PRESSURE: 68 MMHG | TEMPERATURE: 98 F | OXYGEN SATURATION: 98 % | WEIGHT: 181.8 LBS | SYSTOLIC BLOOD PRESSURE: 108 MMHG

## 2024-11-06 DIAGNOSIS — R79.89 LOW TESTOSTERONE IN MALE: ICD-10-CM

## 2024-11-06 DIAGNOSIS — R63.0 DECREASED APPETITE: ICD-10-CM

## 2024-11-06 DIAGNOSIS — F90.0 ATTENTION DEFICIT HYPERACTIVITY DISORDER (ADHD), PREDOMINANTLY INATTENTIVE TYPE: Primary | ICD-10-CM

## 2024-11-06 DIAGNOSIS — I51.7 LEFT VENTRICULAR HYPERTROPHY: ICD-10-CM

## 2024-11-06 DIAGNOSIS — I21.4 NSTEMI (NON-ST ELEVATED MYOCARDIAL INFARCTION): ICD-10-CM

## 2024-11-06 PROCEDURE — 99214 OFFICE O/P EST MOD 30 MIN: CPT | Performed by: PHYSICIAN ASSISTANT

## 2024-11-06 PROCEDURE — 1160F RVW MEDS BY RX/DR IN RCRD: CPT | Performed by: PHYSICIAN ASSISTANT

## 2024-11-06 PROCEDURE — 1126F AMNT PAIN NOTED NONE PRSNT: CPT | Performed by: PHYSICIAN ASSISTANT

## 2024-11-06 PROCEDURE — 1159F MED LIST DOCD IN RCRD: CPT | Performed by: PHYSICIAN ASSISTANT

## 2024-11-06 NOTE — ASSESSMENT & PLAN NOTE
Continue close follow-up with cardiology  Cardiology continues to await Holter monitor results  No blockage on LHC  Left ventricular hypertrophy noted on echo

## 2024-11-06 NOTE — ASSESSMENT & PLAN NOTE
Patient states he has not taken testosterone injection in over 1 month  Feels he is doing well without supplementation at this time  Will continue to monitor

## 2024-11-06 NOTE — ASSESSMENT & PLAN NOTE
Patient currently has approximately 14 days left of current dose of Adderall 20 mg extended release  Will discuss goals for ongoing taper with Dr. Jackson as Holter results have not yet returned  Patient will be contacted with ongoing plan.

## 2024-11-06 NOTE — PROGRESS NOTES
Follow Up Office Visit      Patient Name: Behzad Chi  : 1980   MRN: [id number]     Chief Complaint:    Chief Complaint   Patient presents with    ADHD     F/u    myocardial infarction     F/u       History of Present Illness  The patient presents for a follow-up of ADHD.    He is currently on a regimen of Adderall 20 mg extended release, which he tolerates well.  Previous regimen was Adderall 30 mg extended release and Adderall 10 mg daily in the afternoon.    Approximately 1 month ago, patient presented to the ER secondary to chest pain and syncope.  At that time he was found to have elevated cardiac biomarkers. He underwent a heart cath, CTA chest, and echo all of which were unremarkable excluding some left ventricular hypertrophy on echo..  A 14-day Holter was also placed.  He had a follow-up with cardiology yesterday who continues to await Holter monitor results.    States about 1 week ago, while at work, he experienced an episode of cold sweating and rapid heartbeat that lasted for about 10 minutes.     Patient also reports a decrease in his appetite and early satiety with weight loss from 197 to 181 pounds over the last month.  Per our records, his weight seems to fluctuate between the 180s to 190s over the last several months. He also reports a persistent sweet smell. He reports no additional gastrointestinal symptoms such as constipation, diarrhea, or changes in stool.  States symptoms of decreased appetite and changes in smell have been ongoing x 2 years.    He is currently tapering off Sublocade, stating his last dose was 2 months ago.  He is supposed to take testosterone injections every two weeks, but he has not been consistent with this regimen.  States he has been doing quite well without the Sublocade or testosterone.  He feels the changes in his appetite and smell may be due to testosterone supplementation, or rather lack thereof.        Subjective      I have reviewed and the following  portions of the patient's history were updated as appropriate: past family history, past medical history, past social history, past surgical history and problem list.    Medications:     Current Outpatient Medications:     amphetamine-dextroamphetamine XR (Adderall XR) 20 MG 24 hr capsule, Take 1 capsule by mouth Every Morning, Disp: 30 capsule, Rfl: 0    aspirin 81 MG chewable tablet, Chew 1 tablet Daily., Disp: 30 tablet, Rfl: 11    atorvastatin (LIPITOR) 40 MG tablet, Take 1 tablet by mouth Every Night., Disp: , Rfl:     ibuprofen (ADVIL,MOTRIN) 800 MG tablet, Take 1 tablet by mouth Every 6 (Six) Hours As Needed., Disp: , Rfl:     metoprolol tartrate (LOPRESSOR) 25 MG tablet, Take 1 tablet by mouth Every 12 (Twelve) Hours., Disp: 60 tablet, Rfl: 11    naloxone (NARCAN) 4 MG/0.1ML nasal spray, , Disp: , Rfl:     pantoprazole (PROTONIX) 40 MG EC tablet, Take 1 tablet by mouth Every Night., Disp: 30 tablet, Rfl: 11    sildenafil (Viagra) 100 MG tablet, Take 1 tablet by mouth Daily As Needed for Erectile Dysfunction., Disp: 30 tablet, Rfl: 2    Sublocade 300 MG/1.5ML solution prefilled syringe, INJECT 300MG SUBCUTANEOUSLY ONCE MONTHLY, Disp: , Rfl:     Testosterone Cypionate (DEPOTESTOTERONE CYPIONATE) 200 MG/ML injection, Inject 1 mL into the appropriate muscle as directed by prescriber Every 14 (Fourteen) Days., Disp: 2 mL, Rfl: 5    vitamin D (ERGOCALCIFEROL) 1.25 MG (24681 UT) capsule capsule, Take 1 capsule by mouth 1 (One) Time Per Week., Disp: 12 capsule, Rfl: 1    Allergies:   No Known Allergies    Objective     Physical Exam:   Physical Exam  Constitutional:       General: He is not in acute distress.     Appearance: He is not ill-appearing.   HENT:      Head: Normocephalic.   Cardiovascular:      Rate and Rhythm: Normal rate and regular rhythm.      Heart sounds: No murmur heard.  Pulmonary:      Effort: Pulmonary effort is normal. No respiratory distress.      Breath sounds: Normal breath sounds.    Musculoskeletal:         General: Normal range of motion.   Skin:     General: Skin is warm.   Neurological:      General: No focal deficit present.      Mental Status: He is alert.   Psychiatric:         Mood and Affect: Mood normal.         Vital Signs:   Vitals:    11/06/24 0819   BP: 108/68   BP Location: Left arm   Patient Position: Sitting   Cuff Size: Adult   Pulse: 80   Resp: 20   Temp: 98 °F (36.7 °C)   TempSrc: Infrared   SpO2: 98%   Weight: 82.5 kg (181 lb 12.8 oz)     Body mass index is 30.25 kg/m².         Procedures    Assessment / Plan         Assessment and Plan     Diagnoses and all orders for this visit:    1. Attention deficit hyperactivity disorder (ADHD), predominantly inattentive type (Primary)  Assessment & Plan:  Patient currently has approximately 14 days left of current dose of Adderall 20 mg extended release  Will discuss goals for ongoing taper with Dr. Jackson as Holter results have not yet returned  Patient will be contacted with ongoing plan.      2. NSTEMI (non-ST elevated myocardial infarction)  Assessment & Plan:  Continue close follow-up with cardiology  Cardiology continues to await Holter monitor results  No blockage on LHC  Left ventricular hypertrophy noted on echo       3. Left ventricular hypertrophy  Assessment & Plan:  Previously referred for sleep study for further evaluation  Noted on echo during hospital stay      4. Low testosterone in male  Assessment & Plan:  Patient states he has not taken testosterone injection in over 1 month  Feels he is doing well without supplementation at this time  Will continue to monitor      5. Decreased appetite  Assessment & Plan:  Ongoing issue x 2 years  Patient feels this is associated with testosterone injections  Could also be associated with stimulant therapy  Discussed if early satiety persist patient may benefit from referral to GI for endoscopies  Will continue to monitor closely  Patient down from 196 x 1 month ago, that said  his weight has remained in the 180s since March 2024           Follow Up:   Return in about 4 weeks (around 12/4/2024) for ADHD.    Patient or patient representative verbalized consent for the use of Ambient Listening during the visit with  Mable Cox PA-C for chart documentation. 11/6/2024  08:30 EST    Mable Cox PA-C    [facility] [place]

## 2024-11-06 NOTE — ASSESSMENT & PLAN NOTE
Ongoing issue x 2 years  Patient feels this is associated with testosterone injections  Could also be associated with stimulant therapy  Discussed if early satiety persist patient may benefit from referral to GI for endoscopies  Will continue to monitor closely  Patient down from 196 x 1 month ago, that said his weight has remained in the 180s since March 2024

## 2024-11-07 DIAGNOSIS — F90.0 ATTENTION DEFICIT HYPERACTIVITY DISORDER (ADHD), PREDOMINANTLY INATTENTIVE TYPE: Primary | ICD-10-CM

## 2024-11-07 RX ORDER — DEXTROAMPHETAMINE SACCHARATE, AMPHETAMINE ASPARTATE MONOHYDRATE, DEXTROAMPHETAMINE SULFATE AND AMPHETAMINE SULFATE 2.5; 2.5; 2.5; 2.5 MG/1; MG/1; MG/1; MG/1
10 CAPSULE, EXTENDED RELEASE ORAL EVERY MORNING
Qty: 28 CAPSULE | Refills: 0 | Status: SHIPPED | OUTPATIENT
Start: 2024-11-07

## 2024-11-21 DIAGNOSIS — F90.0 ATTENTION DEFICIT HYPERACTIVITY DISORDER (ADHD), PREDOMINANTLY INATTENTIVE TYPE: ICD-10-CM

## 2024-11-21 DIAGNOSIS — N52.9 ERECTILE DYSFUNCTION, UNSPECIFIED ERECTILE DYSFUNCTION TYPE: ICD-10-CM

## 2024-11-21 DIAGNOSIS — R79.89 LOW TESTOSTERONE IN MALE: ICD-10-CM

## 2024-11-21 DIAGNOSIS — E55.9 VITAMIN D DEFICIENCY: ICD-10-CM

## 2024-11-21 RX ORDER — TESTOSTERONE CYPIONATE 200 MG/ML
200 INJECTION, SOLUTION INTRAMUSCULAR
Qty: 2 ML | Refills: 5 | Status: SHIPPED | OUTPATIENT
Start: 2024-11-21

## 2024-11-21 RX ORDER — ERGOCALCIFEROL 1.25 MG/1
50000 CAPSULE, LIQUID FILLED ORAL WEEKLY
Qty: 12 CAPSULE | Refills: 1 | Status: CANCELLED | OUTPATIENT
Start: 2024-11-21

## 2024-11-21 RX ORDER — SILDENAFIL 100 MG/1
100 TABLET, FILM COATED ORAL DAILY PRN
Qty: 30 TABLET | Refills: 2 | Status: SHIPPED | OUTPATIENT
Start: 2024-11-21

## 2024-11-21 RX ORDER — DEXTROAMPHETAMINE SACCHARATE, AMPHETAMINE ASPARTATE MONOHYDRATE, DEXTROAMPHETAMINE SULFATE AND AMPHETAMINE SULFATE 2.5; 2.5; 2.5; 2.5 MG/1; MG/1; MG/1; MG/1
10 CAPSULE, EXTENDED RELEASE ORAL EVERY MORNING
Qty: 28 CAPSULE | Refills: 0 | OUTPATIENT
Start: 2024-11-21

## 2024-11-21 NOTE — TELEPHONE ENCOUNTER
Caller: Behzad Chi    Relationship: Self    Best call back number: [phone]     Requested Prescriptions:   Requested Prescriptions     Pending Prescriptions Disp Refills    sildenafil (Viagra) 100 MG tablet 30 tablet 2     Sig: Take 1 tablet by mouth Daily As Needed for Erectile Dysfunction.        Pharmacy where request should be sent: [facility] [id number] [address]  AT [facility] [address] - [phone]  - [phone] FX     Last office visit with prescribing clinician: 10/8/2024   Last telemedicine visit with prescribing clinician: Visit date not found   Next office visit with prescribing clinician: 12/6/2024         Does the patient have less than a 3 day supply:  [x] Yes  [] No    Would you like a call back once the refill request has been completed: [] Yes [x] No    If the office needs to give you a call back, can they leave a voicemail: [] Yes [x] No    Suze Urbina Rep   11/21/24 13:23 EST

## 2024-11-21 NOTE — TELEPHONE ENCOUNTER
Caller: Behzad Chi    Relationship: Self    Best call back number: [phone]     Requested Prescriptions:   Requested Prescriptions     Pending Prescriptions Disp Refills    amphetamine-dextroamphetamine XR (Adderall XR) 10 MG 24 hr capsule 28 capsule 0     Sig: Take 1 capsule by mouth Every Morning For two weeks then every other day for two weeks then stop    vitamin D (ERGOCALCIFEROL) 1.25 MG (80912 UT) capsule capsule 12 capsule 1     Sig: Take 1 capsule by mouth 1 (One) Time Per Week.    Testosterone Cypionate (DEPOTESTOTERONE CYPIONATE) 200 MG/ML injection 2 mL 5     Sig: Inject 1 mL into the appropriate muscle as directed by prescriber Every 14 (Fourteen) Days.        Pharmacy where request should be sent: [address] - [phone]  - [phone] FX     Last office visit with prescribing clinician: 10/8/2024   Last telemedicine visit with prescribing clinician: Visit date not found   Next office visit with prescribing clinician: 12/6/2024         Does the patient have less than a 3 day supply:  [x] Yes  [] No    Would you like a call back once the refill request has been completed: [] Yes [x] No    If the office needs to give you a call back, can they leave a voicemail: [] Yes [x] No    Suze Urbina Rep   11/21/24 13:21 EST

## 2024-11-26 ENCOUNTER — TELEPHONE (OUTPATIENT)
Dept: FAMILY MEDICINE CLINIC | Facility: CLINIC | Age: 44
End: 2024-11-26
Payer: COMMERCIAL

## 2024-11-26 NOTE — TELEPHONE ENCOUNTER
Caller: Behzad Chi    Relationship: Self    Best call back number: [phone]    Requested Prescriptions:   amphetamine-dextroamphetamine (Adderall) 10 MG tablet  FAST ACTING    amphetamine-dextroamphetamine XR (Adderall XR) 30 MG 24 hr capsule            Pharmacy where request should be sent: [facility] [place], [place] - [address] - [phone] PH - [phone] FX     Last office visit with prescribing clinician: 10/8/2024   Last telemedicine visit with prescribing clinician: Visit date not found   Next office visit with prescribing clinician: 12/6/2024     Additional details provided by patient: PATIENT IS REQUESTING TO GO BACK ON HIS ADDERALL 30 MG XR AND 10 MG FAST ACTING    Would you like a call back once the refill request has been completed: [] Yes [x] No    If the office needs to give you a call back, can they leave a voicemail: [] Yes [x] No    Jennifer Coles MA   11/26/24 14:40 EST

## 2024-11-27 NOTE — TELEPHONE ENCOUNTER
Patient will need to wait until Dr. Jackson returns to assess request for medication dose change.

## 2024-12-05 NOTE — PROGRESS NOTES
Subjective     Chief Complaint  ADHD (Would like to discuss changing adderall 15 mg)    Subjective          Behzad Chi is a 44 y.o. male who presents today to [facility] for follow up.    HPI:   History of Present Illness    Behazd is a very pleasant 43-year-old male presents today to follow-up on ADHD and low testosterone. He reports concern for weight loss. He hasn't really been trying. He has lost about 20 lbs since October. He notes that he has had a change in smell. He notes that he feels like most things smell like maple syrup. He notes that this started around the same time that he stopped sublocade. He has not been having GERD symptoms. Over the last few days, he hasn't been having as many symptoms the last few days.    For ADHD he is currently taking Adderall 10 mg extended release daily.  We had decreased this due to recent hospital stay for chest pain.  Troponin was elevated and he ultimately underwent a left heart cath that showed no signs of blockage.  Echo did show signs of mild left ventricular hypertrophy.  Due to this, I placed a referral for him to be evaluated by sleep medicine.    He also has a history of low testosterone.  The last level was elevated, his last injection was 2 days prior to his labs being obtained.  This was in October.      The following portions of the patient's history were reviewed and updated as appropriate: allergies, current medications, past family history, past medical history, past social history, past surgical history and problem list.    Objective     Objective     Allergy:   No Known Allergies     Current Medications:   Current Outpatient Medications   Medication Sig Dispense Refill    amphetamine-dextroamphetamine XR (Adderall XR) 10 MG 24 hr capsule Take 1 capsule by mouth Every Morning For two weeks then every other day for two weeks then stop 30 capsule 0    sildenafil (Viagra) 100 MG tablet Take 1 tablet by mouth Daily As  Needed for Erectile Dysfunction. 30 tablet 2    amphetamine-dextroamphetamine (Adderall) 10 MG tablet Take 1 tablet by mouth Daily. 30 tablet 0    aspirin 81 MG chewable tablet Chew 1 tablet Daily. 30 tablet 11    atorvastatin (LIPITOR) 40 MG tablet Take 1 tablet by mouth Every Night.      ibuprofen (ADVIL,MOTRIN) 800 MG tablet Take 1 tablet by mouth Every 6 (Six) Hours As Needed.      metoprolol tartrate (LOPRESSOR) 25 MG tablet Take 1 tablet by mouth Every 12 (Twelve) Hours. 60 tablet 11    pantoprazole (PROTONIX) 40 MG EC tablet Take 1 tablet by mouth Every Night. 30 tablet 11    vitamin D (ERGOCALCIFEROL) 1.25 MG (11217 UT) capsule capsule Take 1 capsule by mouth 1 (One) Time Per Week. 12 capsule 1     No current facility-administered medications for this visit.       Past Medical History:  Past Medical History:   Diagnosis Date    ADHD     Depression     Erectile dysfunction 01/2022    PTSD (post-traumatic stress disorder)        Past Surgical History:  Past Surgical History:   Procedure Laterality Date    APPENDECTOMY      CARDIAC CATHETERIZATION N/A 10/2/2024    Procedure: Left Heart Cath;  Surgeon: Rakesh Alvarez MD;  Location: [facility] CATH INVASIVE LOCATION;  Service: Cardiology;  Laterality: N/A;    CIRCUMCISION      COLONOSCOPY      Need to schedule one       Social History:  Social History     Socioeconomic History    Marital status: Legally    Tobacco Use    Smoking status: Every Day     Current packs/day: 0.50     Average packs/day: 0.5 packs/day for 21.0 years (10.5 ttl pk-yrs)     Types: Cigarettes, Electronic Cigarette     Passive exposure: Current    Smokeless tobacco: Never    Tobacco comments:     Quit for 7 years and started back 6 months ago   Vaping Use    Vaping status: Every Day    Substances: Nicotine, Flavoring    Devices: Disposable, Pre-filled pod    Passive vaping exposure: Yes   Substance and Sexual Activity    Alcohol use: Not Currently    Drug use: Not Currently     "Sexual activity: Yes     Partners: Female     Birth control/protection: None       Family History:  Family History   Problem Relation Age of Onset    Diabetes Mother     Cancer Father     Other Father     Cancer Paternal Grandfather            Vital Signs:   /80   Pulse 88   Temp 98.7 °F (37.1 °C) (Infrared)   Ht 165.1 cm (65\")   Wt 80.6 kg (177 lb 9.6 oz)   BMI 29.55 kg/m²      Physical Exam:  Physical Exam  Vitals reviewed.   Constitutional:       Appearance: He is not ill-appearing.   Eyes:      Pupils: Pupils are equal, round, and reactive to light.   Cardiovascular:      Rate and Rhythm: Normal rate.      Pulses: Normal pulses.   Pulmonary:      Effort: Pulmonary effort is normal.      Breath sounds: Normal breath sounds.   Neurological:      General: No focal deficit present.      Mental Status: He is alert. Mental status is at baseline.   Psychiatric:         Mood and Affect: Mood normal.         Behavior: Behavior normal.         Thought Content: Thought content normal.         Judgment: Judgment normal.               PHQ-9 Score  PHQ-9 Total Score:       Lab Review  Lab on 10/08/2024   Component Date Value Ref Range Status    Glucose 10/08/2024 157 (H)  65 - 99 mg/dL Final    BUN 10/08/2024 12  6 - 20 mg/dL Final    Creatinine 10/08/2024 1.26  0.76 - 1.27 mg/dL Final    Sodium 10/08/2024 138  136 - 145 mmol/L Final    Potassium 10/08/2024 4.6  3.5 - 5.2 mmol/L Final    Chloride 10/08/2024 103  98 - 107 mmol/L Final    CO2 10/08/2024 27.6  22.0 - 29.0 mmol/L Final    Calcium 10/08/2024 9.1  8.6 - 10.5 mg/dL Final    Total Protein 10/08/2024 6.7  6.0 - 8.5 g/dL Final    Albumin 10/08/2024 3.9  3.5 - 5.2 g/dL Final    ALT (SGPT) 10/08/2024 35  1 - 41 U/L Final    AST (SGOT) 10/08/2024 23  1 - 40 U/L Final    Alkaline Phosphatase 10/08/2024 108  39 - 117 U/L Final    Total Bilirubin 10/08/2024 0.3  0.0 - 1.2 mg/dL Final    Globulin 10/08/2024 2.8  gm/dL Final    A/G Ratio 10/08/2024 1.4  g/dL Final "    BUN/Creatinine Ratio 10/08/2024 9.5  7.0 - 25.0 Final    Anion Gap 10/08/2024 7.4  5.0 - 15.0 mmol/L Final    eGFR 10/08/2024 72.1  >60.0 mL/min/1.73 Final    TSH 10/08/2024 2.050  0.270 - 4.200 uIU/mL Final    Testosterone, Total 10/08/2024 1369 (H)  264 - 916 ng/dL Final    Adult male reference interval is based on a population of  healthy nonobese males (BMI <30) between 19 and 39 years old.  rangel Rob.al. JCEM 2017,102;0049-3165. PMID: [id number].    Testosterone, Free 10/08/2024 39.6 (H)  6.8 - 21.5 pg/mL Final    25 Hydroxy, Vitamin D 10/08/2024 35.6  30.0 - 100.0 ng/ml Final    Vitamin B-12 10/08/2024 483  211 - 946 pg/mL Final    WBC 10/08/2024 7.15  3.40 - 10.80 10*3/mm3 Final    RBC 10/08/2024 5.57  4.14 - 5.80 10*6/mm3 Final    Hemoglobin 10/08/2024 16.4  13.0 - 17.7 g/dL Final    Hematocrit 10/08/2024 50.1  37.5 - 51.0 % Final    MCV 10/08/2024 89.9  79.0 - 97.0 fL Final    MCH 10/08/2024 29.4  26.6 - 33.0 pg Final    MCHC 10/08/2024 32.7  31.5 - 35.7 g/dL Final    RDW 10/08/2024 12.7  12.3 - 15.4 % Final    RDW-SD 10/08/2024 41.5  37.0 - 54.0 fl Final    MPV 10/08/2024 10.1  6.0 - 12.0 fL Final    Platelets 10/08/2024 256  140 - 450 10*3/mm3 Final    Neutrophil % 10/08/2024 62.7  42.7 - 76.0 % Final    Lymphocyte % 10/08/2024 26.3  19.6 - 45.3 % Final    Monocyte % 10/08/2024 8.5  5.0 - 12.0 % Final    Eosinophil % 10/08/2024 1.1  0.3 - 6.2 % Final    Basophil % 10/08/2024 0.8  0.0 - 1.5 % Final    Immature Grans % 10/08/2024 0.6 (H)  0.0 - 0.5 % Final    Neutrophils, Absolute 10/08/2024 4.48  1.70 - 7.00 10*3/mm3 Final    Lymphocytes, Absolute 10/08/2024 1.88  0.70 - 3.10 10*3/mm3 Final    Monocytes, Absolute 10/08/2024 0.61  0.10 - 0.90 10*3/mm3 Final    Eosinophils, Absolute 10/08/2024 0.08  0.00 - 0.40 10*3/mm3 Final    Basophils, Absolute 10/08/2024 0.06  0.00 - 0.20 10*3/mm3 Final    Immature Grans, Absolute 10/08/2024 0.04  0.00 - 0.05 10*3/mm3 Final    nRBC 10/08/2024 0.0  0.0 - 0.2 /100 WBC  Final   Admission on 10/02/2024, Discharged on 10/03/2024   Component Date Value Ref Range Status    QT Interval 10/02/2024 354  ms Final    QTC Interval 10/02/2024 365  ms Final    Glucose 10/02/2024 167 (H)  65 - 99 mg/dL Final    BUN 10/02/2024 16  6 - 20 mg/dL Final    Creatinine 10/02/2024 1.46 (H)  0.76 - 1.27 mg/dL Final    Sodium 10/02/2024 138  136 - 145 mmol/L Final    Potassium 10/02/2024 4.1  3.5 - 5.2 mmol/L Final    Slight hemolysis detected by analyzer. Result may be falsely elevated.    Chloride 10/02/2024 101  98 - 107 mmol/L Final    CO2 10/02/2024 29.0  22.0 - 29.0 mmol/L Final    Calcium 10/02/2024 9.3  8.6 - 10.5 mg/dL Final    Total Protein 10/02/2024 6.5  6.0 - 8.5 g/dL Final    Albumin 10/02/2024 4.1  3.5 - 5.2 g/dL Final    ALT (SGPT) 10/02/2024 56 (H)  1 - 41 U/L Final    AST (SGOT) 10/02/2024 30  1 - 40 U/L Final    Alkaline Phosphatase 10/02/2024 99  39 - 117 U/L Final    Total Bilirubin 10/02/2024 0.4  0.0 - 1.2 mg/dL Final    Globulin 10/02/2024 2.4  gm/dL Final    Calculated Result    A/G Ratio 10/02/2024 1.7  g/dL Final    BUN/Creatinine Ratio 10/02/2024 11.0  7.0 - 25.0 Final    Anion Gap 10/02/2024 8.0  5.0 - 15.0 mmol/L Final    eGFR 10/02/2024 60.4  >60.0 mL/min/1.73 Final    Magnesium 10/02/2024 1.8  1.6 - 2.6 mg/dL Final    HS Troponin T 10/02/2024 8  <22 ng/L Final    Extra Tube 10/02/2024 Hold for add-ons.   Final    Auto resulted.    Extra Tube 10/02/2024 hold for add-on   Final    Auto resulted    Extra Tube 10/02/2024 Hold for add-ons.   Final    Auto resulted.    Extra Tube 10/02/2024 Hold for add-ons.   Final    Auto resulted.    Extra Tube 10/02/2024 Hold for add-ons.   Final    Auto resulted    WBC 10/02/2024 7.94  3.40 - 10.80 10*3/mm3 Final    RBC 10/02/2024 5.59  4.14 - 5.80 10*6/mm3 Final    Hemoglobin 10/02/2024 16.8  13.0 - 17.7 g/dL Final    Hematocrit 10/02/2024 49.7  37.5 - 51.0 % Final    MCV 10/02/2024 88.9  79.0 - 97.0 fL Final    MCH 10/02/2024 30.1  26.6 -  33.0 pg Final    MCHC 10/02/2024 33.8  31.5 - 35.7 g/dL Final    RDW 10/02/2024 12.7  12.3 - 15.4 % Final    RDW-SD 10/02/2024 41.3  37.0 - 54.0 fl Final    MPV 10/02/2024 10.1  6.0 - 12.0 fL Final    Platelets 10/02/2024 209  140 - 450 10*3/mm3 Final    Neutrophil % 10/02/2024 55.8  42.7 - 76.0 % Final    Lymphocyte % 10/02/2024 34.8  19.6 - 45.3 % Final    Monocyte % 10/02/2024 7.3  5.0 - 12.0 % Final    Eosinophil % 10/02/2024 1.1  0.3 - 6.2 % Final    Basophil % 10/02/2024 0.5  0.0 - 1.5 % Final    Immature Grans % 10/02/2024 0.5  0.0 - 0.5 % Final    Neutrophils, Absolute 10/02/2024 4.43  1.70 - 7.00 10*3/mm3 Final    Lymphocytes, Absolute 10/02/2024 2.76  0.70 - 3.10 10*3/mm3 Final    Monocytes, Absolute 10/02/2024 0.58  0.10 - 0.90 10*3/mm3 Final    Eosinophils, Absolute 10/02/2024 0.09  0.00 - 0.40 10*3/mm3 Final    Basophils, Absolute 10/02/2024 0.04  0.00 - 0.20 10*3/mm3 Final    Immature Grans, Absolute 10/02/2024 0.04  0.00 - 0.05 10*3/mm3 Final    nRBC 10/02/2024 0.0  0.0 - 0.2 /100 WBC Final    HS Troponin T 10/02/2024 62 (C)  <22 ng/L Final    QT Interval 10/02/2024 322  ms Final    QTC Interval 10/02/2024 367  ms Final    HS Troponin T 10/02/2024 36 (H)  <22 ng/L Final    Troponin T Delta 10/02/2024 -26 (L)  >=-4 - <+4 ng/L Final    Heparin Anti-Xa (UFH) 10/02/2024 0.10 (L)  0.30 - 0.70 IU/ml Final    Protime 10/02/2024 12.5  12.2 - 14.5 Seconds Final    INR 10/02/2024 0.92  0.89 - 1.12 Final    PTT 10/02/2024 26.6 (L)  60.0 - 90.0 seconds Final    LVIDd 10/02/2024 4.6  cm Final    LVIDs 10/02/2024 3.0  cm Final    IVSd 10/02/2024 0.90  cm Final    LVPWd 10/02/2024 1.20  cm Final    IVS/LVPW 10/02/2024 0.75  cm Final    LVOT diam 10/02/2024 2.00  cm Final    MV E max kirk 10/02/2024 75.4  cm/sec Final    MV A max kirk 10/02/2024 50.4  cm/sec Final    MV E/A 10/02/2024 1.50   Final    Med Peak E' Kirk 10/02/2024 10.1  cm/sec Final    Lat Peak E' Kirk 10/02/2024 13.3  cm/sec Final    Avg E/e' ratio  10/02/2024 6.44   Final    RV Base 10/02/2024 4.8  cm Final    RV Mid 10/02/2024 3.8  cm Final    RV Length 10/02/2024 8.3  cm Final    TAPSE (>1.6) 10/02/2024 2.6  cm Final    RV S' 10/02/2024 12.6  cm/sec Final    LA dimension (2D)  10/02/2024 3.2  cm Final    LV V1 max 10/02/2024 73.6  cm/sec Final    LV V1 max PG 10/02/2024 2.17  mmHg Final    LV V1 mean PG 10/02/2024 0.90  mmHg Final    LV V1 VTI 10/02/2024 14.1  cm Final    Ao pk orion 10/02/2024 133.0  cm/sec Final    Ao max PG 10/02/2024 7.1  mmHg Final    MV P1/2t 10/02/2024 67.0  msec Final    RV V1 max PG 10/02/2024 1.08  mmHg Final    RV V1 max 10/02/2024 51.7  cm/sec Final    RV V1 VTI 10/02/2024 13.0  cm Final    PA V2 max 10/02/2024 108.7  cm/sec Final    PA acc time 10/02/2024 0.04  sec Final    PI end-d orion 10/02/2024 108.1  cm/sec Final    Ao root diam 10/02/2024 2.6  cm Final    Ascending aorta 10/02/2024 2.6  cm Final    EF(MOD-bp) 10/02/2024 59.0  % Final    Echo EF Estimated 10/02/2024 59.0  % Final    IVRT 10/02/2024 79.0  ms Final    LA ESV Index (BP) 10/02/2024 26.0  ml/m2 Final    Ao mean PG 10/02/2024 4  mmHg Final    Ao V2 VTI 10/02/2024 22.9  cm Final    OMEGA(I,D) 10/02/2024 193.0  cm2 Final    MVA(P1/2t) 10/02/2024 3.30  cm2 Final    MV dec slope 10/02/2024 424.00  cm/sec2 Final    RAP systole 10/02/2024 8  mmHg Final    PAPd(PI edV) 10/02/2024 5  mmHg Final    Total Cholesterol 10/03/2024 222 (H)  0 - 200 mg/dL Final    Triglycerides 10/03/2024 282 (H)  0 - 150 mg/dL Final    HDL Cholesterol 10/03/2024 36 (L)  40 - 60 mg/dL Final    LDL Cholesterol  10/03/2024 135 (H)  0 - 100 mg/dL Final    VLDL Cholesterol 10/03/2024 51 (H)  5 - 40 mg/dL Final    LDL/HDL Ratio 10/03/2024 3.60   Final    Magnesium 10/03/2024 2.2  1.6 - 2.6 mg/dL Final    TSH 10/03/2024 2.150  0.270 - 4.200 uIU/mL Final    Hemoglobin A1C 10/03/2024 5.40  4.80 - 5.60 % Final    WBC 10/03/2024 11.63 (H)  3.40 - 10.80 10*3/mm3 Final    RBC 10/03/2024 5.44  4.14 - 5.80  10*6/mm3 Final    Hemoglobin 10/03/2024 16.4  13.0 - 17.7 g/dL Final    Hematocrit 10/03/2024 50.3  37.5 - 51.0 % Final    MCV 10/03/2024 92.5  79.0 - 97.0 fL Final    MCH 10/03/2024 30.1  26.6 - 33.0 pg Final    MCHC 10/03/2024 32.6  31.5 - 35.7 g/dL Final    RDW 10/03/2024 12.9  12.3 - 15.4 % Final    RDW-SD 10/03/2024 43.2  37.0 - 54.0 fl Final    MPV 10/03/2024 10.2  6.0 - 12.0 fL Final    Platelets 10/03/2024 213  140 - 450 10*3/mm3 Final    Glucose 10/03/2024 99  65 - 99 mg/dL Final    BUN 10/03/2024 11  6 - 20 mg/dL Final    Creatinine 10/03/2024 1.00  0.76 - 1.27 mg/dL Final    Sodium 10/03/2024 136  136 - 145 mmol/L Final    Potassium 10/03/2024 4.2  3.5 - 5.2 mmol/L Final    Chloride 10/03/2024 102  98 - 107 mmol/L Final    CO2 10/03/2024 24.0  22.0 - 29.0 mmol/L Final    Calcium 10/03/2024 8.6  8.6 - 10.5 mg/dL Final    BUN/Creatinine Ratio 10/03/2024 11.0  7.0 - 25.0 Final    Anion Gap 10/03/2024 10.0  5.0 - 15.0 mmol/L Final    eGFR 10/03/2024 95.2  >60.0 mL/min/1.73 Final        Radiology Results        Assessment / Plan         Assessment and Plan   Diagnoses and all orders for this visit:    1. Attention deficit hyperactivity disorder (ADHD), predominantly inattentive type (Primary)  -     amphetamine-dextroamphetamine XR (Adderall XR) 10 MG 24 hr capsule; Take 1 capsule by mouth Every Morning For two weeks then every other day for two weeks then stop  Dispense: 30 capsule; Refill: 0  -     amphetamine-dextroamphetamine (Adderall) 10 MG tablet; Take 1 tablet by mouth Daily.  Dispense: 30 tablet; Refill: 0  -     CBC & Differential; Future  -     Comprehensive Metabolic Panel; Future    2. Low testosterone in male  Assessment & Plan:  Stopping replacement therapy for now.  He is to obtain labs prior to his next follow-up and need for continued replacement therapy    Orders:  -     CBC & Differential; Future  -     Comprehensive Metabolic Panel; Future  -     Testosterone, Free, Total; Future  -      Testosterone; Future    3. Vitamin D deficiency  Assessment & Plan:  - Continue replacement therapy, Level ordered with labs     Orders:  -     Vitamin D,25-Hydroxy; Future  -     Vitamin B12; Future    4. Left ventricular hypertrophy  Assessment & Plan:  pending referral to sleep medicine      5. Decreased appetite  Assessment & Plan:  Slowly improving.  Is most likely related to coming off of Sublocade.  Will continue monitoring his weight and continued symptoms.      6. Hyperlipidemia, unspecified hyperlipidemia type  -     Hemoglobin A1c; Future  -     TSH Rfx On Abnormal To Free T4; Future  -     Lipid Panel; Future          Discussed possible differential diagnoses, testing, treatment, recommended non-pharmacological interventions, risks, warning signs to monitor for that would indicate need for follow-up in clinic or ER. If no improvement with these regimens or you have new or worsening symptoms follow-up. Patient verbalizes understanding and agreement with plan of care. Denies further needs or concerns.     Patient was given instructions and counseling regarding her condition and for health maintenance advised.          Health Maintenance  Health Maintenance:   Health Maintenance Due   Topic Date Due    ANNUAL PHYSICAL  08/22/2024        Meds ordered during this visit  New Medications Ordered This Visit   Medications    amphetamine-dextroamphetamine XR (Adderall XR) 10 MG 24 hr capsule     Sig: Take 1 capsule by mouth Every Morning For two weeks then every other day for two weeks then stop     Dispense:  30 capsule     Refill:  0    amphetamine-dextroamphetamine (Adderall) 10 MG tablet     Sig: Take 1 tablet by mouth Daily.     Dispense:  30 tablet     Refill:  0       Meds stopped during this visit:  Medications Discontinued During This Encounter   Medication Reason    Sublocade 300 MG/1.5ML solution prefilled syringe *Therapy completed    naloxone (NARCAN) 4 MG/0.1ML nasal spray *Therapy completed     amphetamine-dextroamphetamine XR (Adderall XR) 10 MG 24 hr capsule     Testosterone Cypionate (DEPOTESTOTERONE CYPIONATE) 200 MG/ML injection           Visit Diagnoses    ICD-10-CM ICD-9-CM   1. Attention deficit hyperactivity disorder (ADHD), predominantly inattentive type  F90.0 314.00   2. Low testosterone in male  R79.89 790.99   3. Vitamin D deficiency  E55.9 268.9   4. Left ventricular hypertrophy  I51.7 429.3   5. Decreased appetite  R63.0 783.0   6. Hyperlipidemia, unspecified hyperlipidemia type  E78.5 272.4         Patient was given instructions and counseling regarding his condition or for health maintenance advice. Please see specific information pulled into the AVS if appropriate.     Follow Up   Return in about 3 months (around 3/6/2025) for followup ADHD, Low testosterone .        This document has been electronically signed by Cristiana Jackson DO   December 6, 2024 09:45 EST    Dictated Utilizing Dragon Dictation: Part of this note may be an electronic transcription/translation of spoken language to printed text using the Dragon Dictation System.    Cristiana Jackson D.O.  [facility] [place]

## 2024-12-06 ENCOUNTER — OFFICE VISIT (OUTPATIENT)
Dept: FAMILY MEDICINE CLINIC | Facility: CLINIC | Age: 44
End: 2024-12-06
Payer: COMMERCIAL

## 2024-12-06 VITALS
HEART RATE: 88 BPM | BODY MASS INDEX: 29.59 KG/M2 | SYSTOLIC BLOOD PRESSURE: 116 MMHG | HEIGHT: 65 IN | WEIGHT: 177.6 LBS | DIASTOLIC BLOOD PRESSURE: 80 MMHG | TEMPERATURE: 98.7 F

## 2024-12-06 DIAGNOSIS — I51.7 LEFT VENTRICULAR HYPERTROPHY: ICD-10-CM

## 2024-12-06 DIAGNOSIS — E78.5 HYPERLIPIDEMIA, UNSPECIFIED HYPERLIPIDEMIA TYPE: ICD-10-CM

## 2024-12-06 DIAGNOSIS — F90.0 ATTENTION DEFICIT HYPERACTIVITY DISORDER (ADHD), PREDOMINANTLY INATTENTIVE TYPE: Primary | ICD-10-CM

## 2024-12-06 DIAGNOSIS — E55.9 VITAMIN D DEFICIENCY: ICD-10-CM

## 2024-12-06 DIAGNOSIS — R79.89 LOW TESTOSTERONE IN MALE: ICD-10-CM

## 2024-12-06 DIAGNOSIS — R63.0 DECREASED APPETITE: ICD-10-CM

## 2024-12-06 PROCEDURE — 1160F RVW MEDS BY RX/DR IN RCRD: CPT | Performed by: FAMILY MEDICINE

## 2024-12-06 PROCEDURE — 99214 OFFICE O/P EST MOD 30 MIN: CPT | Performed by: FAMILY MEDICINE

## 2024-12-06 PROCEDURE — 1159F MED LIST DOCD IN RCRD: CPT | Performed by: FAMILY MEDICINE

## 2024-12-06 PROCEDURE — 1126F AMNT PAIN NOTED NONE PRSNT: CPT | Performed by: FAMILY MEDICINE

## 2024-12-06 RX ORDER — DEXTROAMPHETAMINE SACCHARATE, AMPHETAMINE ASPARTATE, DEXTROAMPHETAMINE SULFATE AND AMPHETAMINE SULFATE 2.5; 2.5; 2.5; 2.5 MG/1; MG/1; MG/1; MG/1
10 TABLET ORAL DAILY
Qty: 30 TABLET | Refills: 0 | Status: SHIPPED | OUTPATIENT
Start: 2024-12-06

## 2024-12-06 RX ORDER — DEXTROAMPHETAMINE SACCHARATE, AMPHETAMINE ASPARTATE MONOHYDRATE, DEXTROAMPHETAMINE SULFATE AND AMPHETAMINE SULFATE 2.5; 2.5; 2.5; 2.5 MG/1; MG/1; MG/1; MG/1
10 CAPSULE, EXTENDED RELEASE ORAL EVERY MORNING
Qty: 30 CAPSULE | Refills: 0 | Status: SHIPPED | OUTPATIENT
Start: 2024-12-06

## 2024-12-06 NOTE — ASSESSMENT & PLAN NOTE
Slowly improving.  Is most likely related to coming off of Sublocade.  Will continue monitoring his weight and continued symptoms.

## 2024-12-06 NOTE — ASSESSMENT & PLAN NOTE
Stopping replacement therapy for now.  He is to obtain labs prior to his next follow-up and need for continued replacement therapy

## 2024-12-27 ENCOUNTER — TELEPHONE (OUTPATIENT)
Dept: FAMILY MEDICINE CLINIC | Facility: CLINIC | Age: 44
End: 2024-12-27

## 2024-12-27 NOTE — TELEPHONE ENCOUNTER
Caller: Behzad Chi    Relationship: Self    Best call back number: [phone]    What medication are you requesting:     SOMETHING FOR SYMPTOMS    What are your current symptoms:     COUGH, FATIGUE    How long have you been experiencing symptoms:     TWO DAYS     If a prescription is needed, what is your preferred pharmacy and phone number:      [facility] [id number] [place] [id number] [facility]  AT [facility] [address] - [phone]  - [phone] FX     Additional notes:    PATIENT HAS BEEN AROUND SOMEONE WITH WALKING PNEUMONIA    PLEASE CALL PATIENT IF SOMETHING IS CALLED IN

## 2025-01-02 DIAGNOSIS — F90.0 ATTENTION DEFICIT HYPERACTIVITY DISORDER (ADHD), PREDOMINANTLY INATTENTIVE TYPE: ICD-10-CM

## 2025-01-02 DIAGNOSIS — N52.9 ERECTILE DYSFUNCTION, UNSPECIFIED ERECTILE DYSFUNCTION TYPE: ICD-10-CM

## 2025-01-02 RX ORDER — DEXTROAMPHETAMINE SACCHARATE, AMPHETAMINE ASPARTATE MONOHYDRATE, DEXTROAMPHETAMINE SULFATE AND AMPHETAMINE SULFATE 2.5; 2.5; 2.5; 2.5 MG/1; MG/1; MG/1; MG/1
10 CAPSULE, EXTENDED RELEASE ORAL EVERY MORNING
Qty: 30 CAPSULE | Refills: 0 | Status: SHIPPED | OUTPATIENT
Start: 2025-01-02

## 2025-01-02 RX ORDER — DEXTROAMPHETAMINE SACCHARATE, AMPHETAMINE ASPARTATE, DEXTROAMPHETAMINE SULFATE AND AMPHETAMINE SULFATE 2.5; 2.5; 2.5; 2.5 MG/1; MG/1; MG/1; MG/1
10 TABLET ORAL DAILY
Qty: 30 TABLET | Refills: 0 | Status: SHIPPED | OUTPATIENT
Start: 2025-01-02

## 2025-01-02 RX ORDER — SILDENAFIL 100 MG/1
100 TABLET, FILM COATED ORAL DAILY PRN
Qty: 30 TABLET | Refills: 2 | Status: SHIPPED | OUTPATIENT
Start: 2025-01-02

## 2025-01-02 NOTE — TELEPHONE ENCOUNTER
Caller: Behzad Chi    Relationship: Self    Best call back number: [phone]     Requested Prescriptions:   Requested Prescriptions     Pending Prescriptions Disp Refills    sildenafil (Viagra) 100 MG tablet 30 tablet 2     Sig: Take 1 tablet by mouth Daily As Needed for Erectile Dysfunction.        Pharmacy where request should be sent: [facility] [id number]  AT [facility] & [address] [place] [address] - [phone]  - [phone] FX     Last office visit with prescribing clinician: 12/6/2024   Last telemedicine visit with prescribing clinician: Visit date not found   Next office visit with prescribing clinician: 3/3/2025     Does the patient have less than a 3 day supply:  [x] Yes  [] No    Would you like a call back once the refill request has been completed: [] Yes [] No    If the office needs to give you a call back, can they leave a voicemail: [] Yes [] No    Suze Isbell Rep   01/02/25 14:40 EST

## 2025-01-02 NOTE — TELEPHONE ENCOUNTER
Caller: Behzad Chi    Relationship: Self    Best call back number: [phone]     Requested Prescriptions:   Requested Prescriptions     Pending Prescriptions Disp Refills    amphetamine-dextroamphetamine (Adderall) 10 MG tablet 30 tablet 0     Sig: Take 1 tablet by mouth Daily.    amphetamine-dextroamphetamine XR (Adderall XR) 10 MG 24 hr capsule 30 capsule 0     Sig: Take 1 capsule by mouth Every Morning For two weeks then every other day for two weeks then stop        Pharmacy where request should be sent: [place], [place] - [address] [place] [address] - [phone]  - [phone] FX     Last office visit with prescribing clinician: 12/6/2024   Last telemedicine visit with prescribing clinician: Visit date not found   Next office visit with prescribing clinician: 3/3/2025     Additional details provided by patient: PATIENT IS OUT OF REFILLS.    Does the patient have less than a 3 day supply:  [x] Yes  [] No    Would you like a call back once the refill request has been completed: [] Yes [] No    If the office needs to give you a call back, can they leave a voicemail: [] Yes [] No    Suze Isbell Rep   01/02/25 14:39 EST

## 2025-01-07 ENCOUNTER — TELEPHONE (OUTPATIENT)
Dept: FAMILY MEDICINE CLINIC | Facility: CLINIC | Age: 45
End: 2025-01-07
Payer: COMMERCIAL

## 2025-01-07 NOTE — TELEPHONE ENCOUNTER
OK FOR HUB TO SCHEDULE    If patient calls back please reschedule appt from 01/06/2025 with Dr Jackson

## 2025-02-13 DIAGNOSIS — N52.9 ERECTILE DYSFUNCTION, UNSPECIFIED ERECTILE DYSFUNCTION TYPE: ICD-10-CM

## 2025-02-13 DIAGNOSIS — F90.0 ATTENTION DEFICIT HYPERACTIVITY DISORDER (ADHD), PREDOMINANTLY INATTENTIVE TYPE: ICD-10-CM

## 2025-02-13 RX ORDER — DEXTROAMPHETAMINE SACCHARATE, AMPHETAMINE ASPARTATE, DEXTROAMPHETAMINE SULFATE AND AMPHETAMINE SULFATE 2.5; 2.5; 2.5; 2.5 MG/1; MG/1; MG/1; MG/1
10 TABLET ORAL DAILY
Qty: 30 TABLET | Refills: 0 | Status: SHIPPED | OUTPATIENT
Start: 2025-02-13 | End: 2025-02-17 | Stop reason: SDUPTHER

## 2025-02-13 RX ORDER — DEXTROAMPHETAMINE SACCHARATE, AMPHETAMINE ASPARTATE MONOHYDRATE, DEXTROAMPHETAMINE SULFATE AND AMPHETAMINE SULFATE 2.5; 2.5; 2.5; 2.5 MG/1; MG/1; MG/1; MG/1
10 CAPSULE, EXTENDED RELEASE ORAL EVERY MORNING
Qty: 30 CAPSULE | Refills: 0 | Status: SHIPPED | OUTPATIENT
Start: 2025-02-13 | End: 2025-02-17 | Stop reason: SDUPTHER

## 2025-02-13 RX ORDER — SILDENAFIL 100 MG/1
100 TABLET, FILM COATED ORAL DAILY PRN
Qty: 30 TABLET | Refills: 2 | Status: SHIPPED | OUTPATIENT
Start: 2025-02-13

## 2025-02-13 NOTE — TELEPHONE ENCOUNTER
Caller: Behzad Chi    Relationship: Self    Best call back number: [phone]    Requested Prescriptions:   Requested Prescriptions     Pending Prescriptions Disp Refills    amphetamine-dextroamphetamine (Adderall) 10 MG tablet 30 tablet 0     Sig: Take 1 tablet by mouth Daily.    amphetamine-dextroamphetamine XR (Adderall XR) 10 MG 24 hr capsule 30 capsule 0     Sig: Take 1 capsule by mouth Every Morning        Pharmacy where request should be sent:    [facility] [phone]  Last office visit with prescribing clinician: 12/6/2024   Last telemedicine visit with prescribing clinician: Visit date not found   Next office visit with prescribing clinician: 3/3/2025     Additional details provided by patient: PATIENT HAS 1 DAY LEFT OF MEDICATIONS    Does the patient have less than a 3 day supply:  [x] Yes  [] No    Would you like a call back once the refill request has been completed: [] Yes [x] No    If the office needs to give you a call back, can they leave a voicemail: [] Yes [x] No    Suze Orlando Rep   02/13/25 11:22 EST

## 2025-02-13 NOTE — TELEPHONE ENCOUNTER
Caller: Behzad hCi    Relationship: Self    Best call back number: [phone]    Requested Prescriptions:   Requested Prescriptions     Pending Prescriptions Disp Refills    amphetamine-dextroamphetamine (Adderall) 10 MG tablet 30 tablet 0     Sig: Take 1 tablet by mouth Daily.    amphetamine-dextroamphetamine XR (Adderall XR) 10 MG 24 hr capsule 30 capsule 0     Sig: Take 1 capsule by mouth Every Morning    sildenafil (Viagra) 100 MG tablet 30 tablet 2     Sig: Take 1 tablet by mouth Daily As Needed for Erectile Dysfunction.        Pharmacy where request should be sent: [facility] [id number] [address]  AT [facility] & [address] - [phone]  - [phone] FX     Last office visit with prescribing clinician: 12/6/2024   Last telemedicine visit with prescribing clinician: Visit date not found   Next office visit with prescribing clinician: 3/3/2025     Additional details provided by patient: PATIENT IS OUT OF MEDICATION.    Does the patient have less than a 3 day supply:  [x] Yes  [] No    Would you like a call back once the refill request has been completed: [] Yes [x] No    If the office needs to give you a call back, can they leave a voicemail: [] Yes [x] No    Suze Orlando   02/13/25 11:24 EST

## 2025-02-13 NOTE — TELEPHONE ENCOUNTER
Caller: Behzad Chi    Relationship: Self    Which medication are you concerned about: ADDERALL AND ADDERALL XR, SILDENAFIL    What are your concerns: THESE MEDICATIONS PATIENT GETS AT [facility] AND SILDENAFIL GOES TO [facility]

## 2025-02-15 DIAGNOSIS — E55.9 VITAMIN D DEFICIENCY: ICD-10-CM

## 2025-02-17 ENCOUNTER — TELEPHONE (OUTPATIENT)
Dept: FAMILY MEDICINE CLINIC | Facility: CLINIC | Age: 45
End: 2025-02-17

## 2025-02-17 DIAGNOSIS — E55.9 VITAMIN D DEFICIENCY: ICD-10-CM

## 2025-02-17 DIAGNOSIS — F90.0 ATTENTION DEFICIT HYPERACTIVITY DISORDER (ADHD), PREDOMINANTLY INATTENTIVE TYPE: ICD-10-CM

## 2025-02-17 RX ORDER — ERGOCALCIFEROL 1.25 MG/1
50000 CAPSULE, LIQUID FILLED ORAL WEEKLY
Qty: 12 CAPSULE | Refills: 0 | Status: SHIPPED | OUTPATIENT
Start: 2025-02-17 | End: 2025-02-17 | Stop reason: SDUPTHER

## 2025-02-17 RX ORDER — DEXTROAMPHETAMINE SACCHARATE, AMPHETAMINE ASPARTATE MONOHYDRATE, DEXTROAMPHETAMINE SULFATE AND AMPHETAMINE SULFATE 2.5; 2.5; 2.5; 2.5 MG/1; MG/1; MG/1; MG/1
10 CAPSULE, EXTENDED RELEASE ORAL EVERY MORNING
Qty: 30 CAPSULE | Refills: 0 | Status: SHIPPED | OUTPATIENT
Start: 2025-02-17

## 2025-02-17 RX ORDER — ERGOCALCIFEROL 1.25 MG/1
50000 CAPSULE, LIQUID FILLED ORAL WEEKLY
Qty: 12 CAPSULE | Refills: 0 | Status: SHIPPED | OUTPATIENT
Start: 2025-02-17

## 2025-02-17 RX ORDER — DEXTROAMPHETAMINE SACCHARATE, AMPHETAMINE ASPARTATE, DEXTROAMPHETAMINE SULFATE AND AMPHETAMINE SULFATE 2.5; 2.5; 2.5; 2.5 MG/1; MG/1; MG/1; MG/1
10 TABLET ORAL DAILY
Qty: 30 TABLET | Refills: 0 | Status: SHIPPED | OUTPATIENT
Start: 2025-02-17

## 2025-02-17 NOTE — TELEPHONE ENCOUNTER
Caller: Behzad Chi    Relationship: Self    Best call back number: [phone]     What is the best time to reach you: ANY    Who are you requesting to speak with (clinical staff, provider,  specific staff member): NURSE    Do you know the name of the person who called: PATIENT    What was the call regarding: ADDERALL SENT TO WRONG PHARMACY.  PLEASE RESEND TO [facility].  PATIENT OUT OF MEDICATION    Is it okay if the provider responds through [facility]: CALL IF NEEDED

## 2025-03-03 ENCOUNTER — OFFICE VISIT (OUTPATIENT)
Dept: FAMILY MEDICINE CLINIC | Facility: CLINIC | Age: 45
End: 2025-03-03
Payer: COMMERCIAL

## 2025-03-03 VITALS
WEIGHT: 184.8 LBS | OXYGEN SATURATION: 97 % | BODY MASS INDEX: 30.75 KG/M2 | HEART RATE: 73 BPM | SYSTOLIC BLOOD PRESSURE: 116 MMHG | DIASTOLIC BLOOD PRESSURE: 68 MMHG | TEMPERATURE: 98.4 F

## 2025-03-03 DIAGNOSIS — E55.9 VITAMIN D DEFICIENCY: ICD-10-CM

## 2025-03-03 DIAGNOSIS — F90.0 ATTENTION DEFICIT HYPERACTIVITY DISORDER (ADHD), PREDOMINANTLY INATTENTIVE TYPE: Primary | ICD-10-CM

## 2025-03-03 DIAGNOSIS — E78.5 HYPERLIPIDEMIA, UNSPECIFIED HYPERLIPIDEMIA TYPE: ICD-10-CM

## 2025-03-03 DIAGNOSIS — R79.89 LOW TESTOSTERONE IN MALE: ICD-10-CM

## 2025-03-03 PROCEDURE — 1126F AMNT PAIN NOTED NONE PRSNT: CPT | Performed by: FAMILY MEDICINE

## 2025-03-03 PROCEDURE — 99214 OFFICE O/P EST MOD 30 MIN: CPT | Performed by: FAMILY MEDICINE

## 2025-03-03 RX ORDER — DEXTROAMPHETAMINE SACCHARATE, AMPHETAMINE ASPARTATE MONOHYDRATE, DEXTROAMPHETAMINE SULFATE AND AMPHETAMINE SULFATE 5; 5; 5; 5 MG/1; MG/1; MG/1; MG/1
20 CAPSULE, EXTENDED RELEASE ORAL EVERY MORNING
Qty: 28 CAPSULE | Refills: 0 | Status: SHIPPED | OUTPATIENT
Start: 2025-03-03

## 2025-03-03 RX ORDER — DEXTROAMPHETAMINE SACCHARATE, AMPHETAMINE ASPARTATE, DEXTROAMPHETAMINE SULFATE AND AMPHETAMINE SULFATE 2.5; 2.5; 2.5; 2.5 MG/1; MG/1; MG/1; MG/1
10 TABLET ORAL DAILY
Qty: 30 TABLET | Refills: 0 | Status: SHIPPED | OUTPATIENT
Start: 2025-03-03

## 2025-03-03 RX ORDER — TESTOSTERONE CYPIONATE 200 MG/ML
200 INJECTION, SOLUTION INTRAMUSCULAR
Qty: 3 ML | Refills: 2 | Status: SHIPPED | OUTPATIENT
Start: 2025-03-03

## 2025-03-03 RX ORDER — TESTOSTERONE CYPIONATE 200 MG/ML
200 INJECTION, SOLUTION INTRAMUSCULAR
COMMUNITY
Start: 2025-02-13 | End: 2025-03-03 | Stop reason: SDUPTHER

## 2025-03-03 NOTE — PROGRESS NOTES
Subjective     Chief Complaint  mth follow up ADHD  (No concerns.)    Subjective          Behzad Chi is a 44 y.o. male who presents today to [facility] for follow up.    HPI:   History of Present Illness    History of Present Illness  The patient is a pleasant 44-year-old male who presents today to follow up on ADHD, weight loss, and testosterone management.    He reports a general sense of well-being since his last visit. He has been experiencing a persistent odor, which he describes as pleasant but pervasive, similar to the scent of incense. This olfactory disturbance has led to a significant weight loss, from 197 pounds to 166 pounds, over a period of 3 months. Despite the return of his appetite and subsequent weight gain to approximately 180 pounds, the odor persists. He is uncertain about the cause of this symptom. He is not overly concerned about this issue at present, given the return of his appetite.    He is currently on a regimen of 10 mg twice daily for his ADHD and expresses a desire to increase the dosage to 30 mg once daily, as he believes it was more effective. He also notes a change in the appearance of his medication capsules.    He continues to self-administer testosterone injections without any reported issues.    Supplemental Information  He had an appointment with the cardiologist in October, but it was rescheduled because the cardiologist is no longer with [facility].    MEDICATIONS  Current: testosterone      The following portions of the patient's history were reviewed and updated as appropriate: allergies, current medications, past family history, past medical history, past social history, past surgical history and problem list.    Objective     Objective     Allergy:   No Known Allergies     Current Medications:   Current Outpatient Medications   Medication Sig Dispense Refill    amphetamine-dextroamphetamine (Adderall) 10 MG tablet Take 1  tablet by mouth Daily. 30 tablet 0    aspirin 81 MG chewable tablet Chew 1 tablet Daily. 30 tablet 11    atorvastatin (LIPITOR) 40 MG tablet Take 1 tablet by mouth Every Night.      ibuprofen (ADVIL,MOTRIN) 800 MG tablet Take 1 tablet by mouth Every 6 (Six) Hours As Needed.      metoprolol tartrate (LOPRESSOR) 25 MG tablet Take 1 tablet by mouth Every 12 (Twelve) Hours. 60 tablet 11    pantoprazole (PROTONIX) 40 MG EC tablet Take 1 tablet by mouth Every Night. 30 tablet 11    sildenafil (Viagra) 100 MG tablet Take 1 tablet by mouth Daily As Needed for Erectile Dysfunction. 30 tablet 2    Testosterone Cypionate (DEPOTESTOTERONE CYPIONATE) 200 MG/ML injection Inject 1 mL into the appropriate muscle as directed by prescriber Every 14 (Fourteen) Days. 3 mL 2    vitamin D (ERGOCALCIFEROL) 1.25 MG (06084 UT) capsule capsule Take 1 capsule by mouth 1 (One) Time Per Week. 12 capsule 0    amphetamine-dextroamphetamine XR (Adderall XR) 20 MG 24 hr capsule Take 1 capsule by mouth Every Morning 28 capsule 0     No current facility-administered medications for this visit.       Past Medical History:  Past Medical History:   Diagnosis Date    ADHD     Depression     Erectile dysfunction 01/2022    PTSD (post-traumatic stress disorder)        Past Surgical History:  Past Surgical History:   Procedure Laterality Date    APPENDECTOMY      CARDIAC CATHETERIZATION N/A 10/2/2024    Procedure: Left Heart Cath;  Surgeon: Rakesh Alvarez MD;  Location: [facility] CATH INVASIVE LOCATION;  Service: Cardiology;  Laterality: N/A;    CIRCUMCISION      COLONOSCOPY      Need to schedule one       Social History:  Social History     Socioeconomic History    Marital status: Legally    Tobacco Use    Smoking status: Every Day     Current packs/day: 0.50     Average packs/day: 0.5 packs/day for 21.0 years (10.5 ttl pk-yrs)     Types: Cigarettes, Electronic Cigarette     Passive exposure: Current    Smokeless tobacco: Never    Tobacco  comments:     Quit for 7 years and started back 6 months ago   Vaping Use    Vaping status: Every Day    Substances: Nicotine, Flavoring    Devices: Disposable, Pre-filled pod    Passive vaping exposure: Yes   Substance and Sexual Activity    Alcohol use: Not Currently    Drug use: Not Currently    Sexual activity: Yes     Partners: Female     Birth control/protection: None       Family History:  Family History   Problem Relation Age of Onset    Diabetes Mother     Cancer Father     Other Father     Cancer Paternal Grandfather          Vital Signs:   /68   Pulse 73   Temp 98.4 °F (36.9 °C) (Temporal)   Wt 83.8 kg (184 lb 12.8 oz)   SpO2 97%   BMI 30.75 kg/m²      Physical Exam:  Physical Exam  Vitals reviewed.   Constitutional:       Appearance: He is not ill-appearing.   Eyes:      Pupils: Pupils are equal, round, and reactive to light.   Cardiovascular:      Rate and Rhythm: Normal rate.      Pulses: Normal pulses.   Pulmonary:      Effort: Pulmonary effort is normal.      Breath sounds: Normal breath sounds.   Neurological:      General: No focal deficit present.      Mental Status: He is alert. Mental status is at baseline.   Psychiatric:         Mood and Affect: Mood normal.         Thought Content: Thought content normal.         Judgment: Judgment normal.               PHQ-9 Score  PHQ-9 Total Score:      Lab Review  No visits with results within 3 Month(s) from this visit.   Latest known visit with results is:   Lab on 10/08/2024   Component Date Value Ref Range Status    Glucose 10/08/2024 157 (H)  65 - 99 mg/dL Final    BUN 10/08/2024 12  6 - 20 mg/dL Final    Creatinine 10/08/2024 1.26  0.76 - 1.27 mg/dL Final    Sodium 10/08/2024 138  136 - 145 mmol/L Final    Potassium 10/08/2024 4.6  3.5 - 5.2 mmol/L Final    Chloride 10/08/2024 103  98 - 107 mmol/L Final    CO2 10/08/2024 27.6  22.0 - 29.0 mmol/L Final    Calcium 10/08/2024 9.1  8.6 - 10.5 mg/dL Final    Total Protein 10/08/2024 6.7  6.0 -  8.5 g/dL Final    Albumin 10/08/2024 3.9  3.5 - 5.2 g/dL Final    ALT (SGPT) 10/08/2024 35  1 - 41 U/L Final    AST (SGOT) 10/08/2024 23  1 - 40 U/L Final    Alkaline Phosphatase 10/08/2024 108  39 - 117 U/L Final    Total Bilirubin 10/08/2024 0.3  0.0 - 1.2 mg/dL Final    Globulin 10/08/2024 2.8  gm/dL Final    A/G Ratio 10/08/2024 1.4  g/dL Final    BUN/Creatinine Ratio 10/08/2024 9.5  7.0 - 25.0 Final    Anion Gap 10/08/2024 7.4  5.0 - 15.0 mmol/L Final    eGFR 10/08/2024 72.1  >60.0 mL/min/1.73 Final    TSH 10/08/2024 2.050  0.270 - 4.200 uIU/mL Final    Testosterone, Total 10/08/2024 1369 (H)  264 - 916 ng/dL Final    Adult male reference interval is based on a population of  healthy nonobese males (BMI <30) between 19 and 39 years old.  Liane, et.al. JCEM 2017,102;3841-1502. PMID: 59589585.    Testosterone, Free 10/08/2024 39.6 (H)  6.8 - 21.5 pg/mL Final    25 Hydroxy, Vitamin D 10/08/2024 35.6  30.0 - 100.0 ng/ml Final    Vitamin B-12 10/08/2024 483  211 - 946 pg/mL Final    WBC 10/08/2024 7.15  3.40 - 10.80 10*3/mm3 Final    RBC 10/08/2024 5.57  4.14 - 5.80 10*6/mm3 Final    Hemoglobin 10/08/2024 16.4  13.0 - 17.7 g/dL Final    Hematocrit 10/08/2024 50.1  37.5 - 51.0 % Final    MCV 10/08/2024 89.9  79.0 - 97.0 fL Final    MCH 10/08/2024 29.4  26.6 - 33.0 pg Final    MCHC 10/08/2024 32.7  31.5 - 35.7 g/dL Final    RDW 10/08/2024 12.7  12.3 - 15.4 % Final    RDW-SD 10/08/2024 41.5  37.0 - 54.0 fl Final    MPV 10/08/2024 10.1  6.0 - 12.0 fL Final    Platelets 10/08/2024 256  140 - 450 10*3/mm3 Final    Neutrophil % 10/08/2024 62.7  42.7 - 76.0 % Final    Lymphocyte % 10/08/2024 26.3  19.6 - 45.3 % Final    Monocyte % 10/08/2024 8.5  5.0 - 12.0 % Final    Eosinophil % 10/08/2024 1.1  0.3 - 6.2 % Final    Basophil % 10/08/2024 0.8  0.0 - 1.5 % Final    Immature Grans % 10/08/2024 0.6 (H)  0.0 - 0.5 % Final    Neutrophils, Absolute 10/08/2024 4.48  1.70 - 7.00 10*3/mm3 Final    Lymphocytes, Absolute 10/08/2024  1.88  0.70 - 3.10 10*3/mm3 Final    Monocytes, Absolute 10/08/2024 0.61  0.10 - 0.90 10*3/mm3 Final    Eosinophils, Absolute 10/08/2024 0.08  0.00 - 0.40 10*3/mm3 Final    Basophils, Absolute 10/08/2024 0.06  0.00 - 0.20 10*3/mm3 Final    Immature Grans, Absolute 10/08/2024 0.04  0.00 - 0.05 10*3/mm3 Final    nRBC 10/08/2024 0.0  0.0 - 0.2 /100 WBC Final        Radiology Results        Assessment / Plan         Assessment and Plan   Diagnoses and all orders for this visit:    1. Attention deficit hyperactivity disorder (ADHD), predominantly inattentive type (Primary)  Assessment & Plan:  Psychological condition is improving with treatment.  Medication changes per orders.  Psychological condition  will be reassessed in 3 months.    He reports that the current dosage of 10 mg XR in the AM and IR in the afternoon  is not as effective as the previous regimen of 30 mg once daily. He will be transitioned back to a dosage of 20 mg for a duration of one month. Upon his request for a refill, the dosage will be escalated to 30 mg.    The patient has read and signed the [facility] Controlled Substance Contract.  I will continue to see patient for regular follow up appointments.  They are well controlled on their medication.  TOM is updated every 3 months. The patient is aware of the potential for addiction and dependence.      Orders:  -     amphetamine-dextroamphetamine XR (Adderall XR) 20 MG 24 hr capsule; Take 1 capsule by mouth Every Morning  Dispense: 28 capsule; Refill: 0  -     amphetamine-dextroamphetamine (Adderall) 10 MG tablet; Take 1 tablet by mouth Daily.  Dispense: 30 tablet; Refill: 0  -     CBC & Differential; Future  -     Lipid Panel; Future  -     TSH Rfx On Abnormal To Free T4; Future    2. Vitamin D deficiency  -     Vitamin B12; Future  -     Vitamin D,25-Hydroxy; Future    3. Low testosterone in male  Assessment & Plan:  He is currently on testosterone injections and is able to self-administer  them without issues. Blood work will be conducted to monitor his testosterone levels and ensure his blood counts are within normal range. He can return later today or within the next week to complete the labs.    Orders:  -     Testosterone Cypionate (DEPOTESTOTERONE CYPIONATE) 200 MG/ML injection; Inject 1 mL into the appropriate muscle as directed by prescriber Every 14 (Fourteen) Days.  Dispense: 3 mL; Refill: 2  -     CBC & Differential; Future  -     Testosterone, Free, Total; Future    4. Hyperlipidemia, unspecified hyperlipidemia type  Assessment & Plan:   His cholesterol levels were previously elevated, and he was started on medication. Blood work will be conducted to reassess his cholesterol levels    Orders:  -     CBC & Differential; Future  -     Comprehensive Metabolic Panel; Future  -     Hemoglobin A1c; Future  -     Lipid Panel; Future        Assessment & Plan  Weight loss.  He experienced significant weight loss, dropping from 197 lbs to 166 lbs over three months, which he attributes to a persistent smell affecting his appetite. His weight has since increased to around 180 lbs as his appetite returned, although the smell has persisted. He is not overly concerned about this issue at present, given the return of his appetite.        Discussed possible differential diagnoses, testing, treatment, recommended non-pharmacological interventions, risks, warning signs to monitor for that would indicate need for follow-up in clinic or ER. If no improvement with these regimens or you have new or worsening symptoms follow-up. Patient verbalizes understanding and agreement with plan of care. Denies further needs or concerns.     Patient was given instructions and counseling regarding her condition and for health maintenance advised.       Health Maintenance  Health Maintenance:   Health Maintenance Due   Topic Date Due    Pneumococcal Vaccine 0-49 (1 of 2 - PCV) Never done    ANNUAL PHYSICAL  08/22/2024     COVID-19 Vaccine (1 - 2024-25 season) Never done        Meds ordered during this visit  New Medications Ordered This Visit   Medications    amphetamine-dextroamphetamine XR (Adderall XR) 20 MG 24 hr capsule     Sig: Take 1 capsule by mouth Every Morning     Dispense:  28 capsule     Refill:  0    amphetamine-dextroamphetamine (Adderall) 10 MG tablet     Sig: Take 1 tablet by mouth Daily.     Dispense:  30 tablet     Refill:  0    Testosterone Cypionate (DEPOTESTOTERONE CYPIONATE) 200 MG/ML injection     Sig: Inject 1 mL into the appropriate muscle as directed by prescriber Every 14 (Fourteen) Days.     Dispense:  3 mL     Refill:  2       Meds stopped during this visit:  Medications Discontinued During This Encounter   Medication Reason    amphetamine-dextroamphetamine XR (Adderall XR) 10 MG 24 hr capsule Duplicate order    amphetamine-dextroamphetamine (Adderall) 10 MG tablet Reorder    Testosterone Cypionate (DEPOTESTOTERONE CYPIONATE) 200 MG/ML injection Reorder        Visit Diagnoses    ICD-10-CM ICD-9-CM   1. Attention deficit hyperactivity disorder (ADHD), predominantly inattentive type  F90.0 314.00   2. Vitamin D deficiency  E55.9 268.9   3. Low testosterone in male  R79.89 790.99   4. Hyperlipidemia, unspecified hyperlipidemia type  E78.5 272.4       Patient was given instructions and counseling regarding his condition or for health maintenance advice. Please see specific information pulled into the AVS if appropriate.     Follow Up   Return in about 3 months (around 6/3/2025) for followup.      Patient or patient representative verbalized consent for the use of Ambient Listening during the visit with  Cristiana Jackson DO for chart documentation. 3/3/2025  08:21 EST      This document has been electronically signed by Cristiana Jackson DO   March 3, 2025 08:37 EST    Dictated Utilizing Dragon Dictation: Part of this note may be an electronic transcription/translation of spoken language to printed text using the  Dragon Dictation System.    Cristiana Jackson D.O.  [facility] [place]

## 2025-03-03 NOTE — ASSESSMENT & PLAN NOTE
His cholesterol levels were previously elevated, and he was started on medication. Blood work will be conducted to reassess his cholesterol levels

## 2025-03-03 NOTE — ASSESSMENT & PLAN NOTE
Psychological condition is improving with treatment.  Medication changes per orders.  Psychological condition  will be reassessed in 3 months.    He reports that the current dosage of 10 mg XR in the AM and IR in the afternoon  is not as effective as the previous regimen of 30 mg once daily. He will be transitioned back to a dosage of 20 mg for a duration of one month. Upon his request for a refill, the dosage will be escalated to 30 mg.    The patient has read and signed the [facility] Controlled Substance Contract.  I will continue to see patient for regular follow up appointments.  They are well controlled on their medication.  OTM is updated every 3 months. The patient is aware of the potential for addiction and dependence.

## 2025-03-03 NOTE — ASSESSMENT & PLAN NOTE
He is currently on testosterone injections and is able to self-administer them without issues. Blood work will be conducted to monitor his testosterone levels and ensure his blood counts are within normal range. He can return later today or within the next week to complete the labs.

## 2025-03-05 LAB
CV ZIO BASELINE AVG BPM: 83
CV ZIO BASELINE BPM HIGH: 142
CV ZIO BASELINE BPM LOW: 52

## 2025-03-19 ENCOUNTER — TELEPHONE (OUTPATIENT)
Dept: FAMILY MEDICINE CLINIC | Facility: CLINIC | Age: 45
End: 2025-03-19
Payer: COMMERCIAL

## 2025-03-19 DIAGNOSIS — R79.89 LOW TESTOSTERONE IN MALE: ICD-10-CM

## 2025-03-19 DIAGNOSIS — E55.9 VITAMIN D DEFICIENCY: ICD-10-CM

## 2025-03-19 DIAGNOSIS — F90.0 ATTENTION DEFICIT HYPERACTIVITY DISORDER (ADHD), PREDOMINANTLY INATTENTIVE TYPE: ICD-10-CM

## 2025-03-19 DIAGNOSIS — N52.9 ERECTILE DYSFUNCTION, UNSPECIFIED ERECTILE DYSFUNCTION TYPE: ICD-10-CM

## 2025-03-19 RX ORDER — SILDENAFIL 100 MG/1
100 TABLET, FILM COATED ORAL DAILY PRN
Qty: 30 TABLET | Refills: 2 | Status: SHIPPED | OUTPATIENT
Start: 2025-03-19

## 2025-03-19 RX ORDER — TESTOSTERONE CYPIONATE 200 MG/ML
200 INJECTION, SOLUTION INTRAMUSCULAR
Qty: 3 ML | Refills: 2 | Status: SHIPPED | OUTPATIENT
Start: 2025-03-19

## 2025-03-19 RX ORDER — ERGOCALCIFEROL 1.25 MG/1
50000 CAPSULE, LIQUID FILLED ORAL WEEKLY
Qty: 12 CAPSULE | Refills: 0 | Status: SHIPPED | OUTPATIENT
Start: 2025-03-19

## 2025-03-19 RX ORDER — DEXTROAMPHETAMINE SACCHARATE, AMPHETAMINE ASPARTATE, DEXTROAMPHETAMINE SULFATE AND AMPHETAMINE SULFATE 2.5; 2.5; 2.5; 2.5 MG/1; MG/1; MG/1; MG/1
10 TABLET ORAL DAILY
Qty: 30 TABLET | Refills: 0 | Status: SHIPPED | OUTPATIENT
Start: 2025-03-19

## 2025-03-19 RX ORDER — ASPIRIN 81 MG/1
81 TABLET, CHEWABLE ORAL DAILY
Qty: 30 TABLET | Refills: 11 | Status: SHIPPED | OUTPATIENT
Start: 2025-03-19

## 2025-03-19 NOTE — TELEPHONE ENCOUNTER
Medication requested (name and dose):     sildenafil (Viagra) 100 MG tablet     Pharmacy where request should be sent:     [facility] [id number] - [address]  AT [facility] to [facility] & [address]  - [phone]  - [phone] FX     Additional details provided by patient:     PATIENT STATES HE NEEDS SILDENAFIL TO BE SENT TO [facility] ON [address].     Best call back number:     [phone]     Does the patient have less than a 3 day supply:  [] Yes  [] No    Wojciech Ontiveros  03/19/25, 08:46 EDT

## 2025-03-19 NOTE — TELEPHONE ENCOUNTER
Medication requested (name and dose):     amphetamine-dextroamphetamine XR (Adderall XR) 20 MG 24 hr capsule     amphetamine-dextroamphetamine (Adderall) 10 MG tablet     vitamin D (ERGOCALCIFEROL) 1.25 MG (04872 UT) capsule capsule     Testosterone Cypionate (DEPOTESTOTERONE CYPIONATE) 200 MG/ML injection     Pharmacy where request should be sent:     [facility] - [place], [place] - [address] [place] [address] - [phone]  - [phone] FX     Additional details provided by patient:     PATIENT REQUESTED FOR BOTH ADDERALL PRESCRIPTIONS, VITAMIN D, AND TESTOSTERONE TO [facility].     PATIENT STATES HE NO LONGER WANTS TO TAKE ANY OF HIS CARDIAC MEDICATIONS.     Best call back number:     [phone]    Does the patient have less than a 3 day supply:  [] Yes  [] No    Wojciech Ontiveros  03/19/25, 08:50 EDT

## 2025-04-18 DIAGNOSIS — R79.89 LOW TESTOSTERONE IN MALE: ICD-10-CM

## 2025-04-18 DIAGNOSIS — N52.9 ERECTILE DYSFUNCTION, UNSPECIFIED ERECTILE DYSFUNCTION TYPE: ICD-10-CM

## 2025-04-18 DIAGNOSIS — F90.0 ATTENTION DEFICIT HYPERACTIVITY DISORDER (ADHD), PREDOMINANTLY INATTENTIVE TYPE: ICD-10-CM

## 2025-04-18 RX ORDER — TESTOSTERONE CYPIONATE 200 MG/ML
200 INJECTION, SOLUTION INTRAMUSCULAR
Qty: 3 ML | Refills: 2 | Status: SHIPPED | OUTPATIENT
Start: 2025-04-18

## 2025-04-18 RX ORDER — DEXTROAMPHETAMINE SACCHARATE, AMPHETAMINE ASPARTATE, DEXTROAMPHETAMINE SULFATE AND AMPHETAMINE SULFATE 2.5; 2.5; 2.5; 2.5 MG/1; MG/1; MG/1; MG/1
10 TABLET ORAL DAILY
Qty: 30 TABLET | Refills: 0 | Status: SHIPPED | OUTPATIENT
Start: 2025-04-18

## 2025-04-18 RX ORDER — SILDENAFIL 100 MG/1
100 TABLET, FILM COATED ORAL DAILY PRN
Qty: 30 TABLET | Refills: 2 | Status: SHIPPED | OUTPATIENT
Start: 2025-04-18

## 2025-04-18 RX ORDER — DEXTROAMPHETAMINE SACCHARATE, AMPHETAMINE ASPARTATE MONOHYDRATE, DEXTROAMPHETAMINE SULFATE AND AMPHETAMINE SULFATE 5; 5; 5; 5 MG/1; MG/1; MG/1; MG/1
20 CAPSULE, EXTENDED RELEASE ORAL EVERY MORNING
Qty: 28 CAPSULE | Refills: 0 | Status: SHIPPED | OUTPATIENT
Start: 2025-04-18

## 2025-05-22 DIAGNOSIS — E55.9 VITAMIN D DEFICIENCY: ICD-10-CM

## 2025-05-22 DIAGNOSIS — N52.9 ERECTILE DYSFUNCTION, UNSPECIFIED ERECTILE DYSFUNCTION TYPE: ICD-10-CM

## 2025-05-22 DIAGNOSIS — F90.0 ATTENTION DEFICIT HYPERACTIVITY DISORDER (ADHD), PREDOMINANTLY INATTENTIVE TYPE: ICD-10-CM

## 2025-05-22 DIAGNOSIS — R79.89 LOW TESTOSTERONE IN MALE: ICD-10-CM

## 2025-05-22 NOTE — TELEPHONE ENCOUNTER
Caller: Behzad Chi    Relationship: Self    Best call back number: [phone]     Requested Prescriptions:   Requested Prescriptions     Pending Prescriptions Disp Refills    amphetamine-dextroamphetamine XR (Adderall XR) 20 MG 24 hr capsule 28 capsule 0     Sig: Take 1 capsule by mouth Every Morning    amphetamine-dextroamphetamine (Adderall) 10 MG tablet 30 tablet 0     Sig: Take 1 tablet by mouth Daily.    Testosterone Cypionate (DEPOTESTOTERONE CYPIONATE) 200 MG/ML injection 3 mL 2     Sig: Inject 1 mL into the appropriate muscle as directed by prescriber Every 14 (Fourteen) Days.    vitamin D (ERGOCALCIFEROL) 1.25 MG (65660 UT) capsule capsule 12 capsule 0     Sig: Take 1 capsule by mouth 1 (One) Time Per Week.    sildenafil (Viagra) 100 MG tablet 30 tablet 2     Sig: Take 1 tablet by mouth Daily As Needed for Erectile Dysfunction.        Pharmacy where request should be sent: [address] - [phone]  - [phone] FX  [facility] [id number] [address] [facility]  AT [facility] [place] & [address]  - [phone]  - [phone] FX     Last office visit with prescribing clinician: 3/3/2025   Last telemedicine visit with prescribing clinician: Visit date not found   Next office visit with prescribing clinician: 6/10/2025     NOTE: PATIENT WOULD LIKE TO KNOW IF HE CAN GO TO 30MG ADDERALL XR.    Does the patient have less than a 3 day supply:  [x] Yes  [] No    Would you like a call back once the refill request has been completed: [] Yes [x] No    If the office needs to give you a call back, can they leave a voicemail: [] Yes [x] No    Suze Mazariegos Rep   05/22/25 17:25 EDT

## 2025-05-23 RX ORDER — SILDENAFIL 100 MG/1
100 TABLET, FILM COATED ORAL DAILY PRN
Qty: 30 TABLET | Refills: 2 | Status: SHIPPED | OUTPATIENT
Start: 2025-05-23

## 2025-05-23 RX ORDER — DEXTROAMPHETAMINE SACCHARATE, AMPHETAMINE ASPARTATE, DEXTROAMPHETAMINE SULFATE AND AMPHETAMINE SULFATE 2.5; 2.5; 2.5; 2.5 MG/1; MG/1; MG/1; MG/1
10 TABLET ORAL DAILY
Qty: 30 TABLET | Refills: 0 | Status: SHIPPED | OUTPATIENT
Start: 2025-05-23

## 2025-05-23 RX ORDER — DEXTROAMPHETAMINE SACCHARATE, AMPHETAMINE ASPARTATE MONOHYDRATE, DEXTROAMPHETAMINE SULFATE AND AMPHETAMINE SULFATE 5; 5; 5; 5 MG/1; MG/1; MG/1; MG/1
20 CAPSULE, EXTENDED RELEASE ORAL EVERY MORNING
Qty: 28 CAPSULE | Refills: 0 | Status: SHIPPED | OUTPATIENT
Start: 2025-05-23

## 2025-05-23 RX ORDER — ERGOCALCIFEROL 1.25 MG/1
50000 CAPSULE, LIQUID FILLED ORAL WEEKLY
Qty: 12 CAPSULE | Refills: 0 | Status: SHIPPED | OUTPATIENT
Start: 2025-05-23

## 2025-05-23 RX ORDER — TESTOSTERONE CYPIONATE 200 MG/ML
200 INJECTION, SOLUTION INTRAMUSCULAR
Qty: 3 ML | Refills: 2 | Status: SHIPPED | OUTPATIENT
Start: 2025-05-23

## 2025-06-10 ENCOUNTER — OFFICE VISIT (OUTPATIENT)
Dept: FAMILY MEDICINE CLINIC | Facility: CLINIC | Age: 45
End: 2025-06-10
Payer: COMMERCIAL

## 2025-06-10 ENCOUNTER — LAB (OUTPATIENT)
Dept: LAB | Facility: HOSPITAL | Age: 45
End: 2025-06-10
Payer: COMMERCIAL

## 2025-06-10 ENCOUNTER — TELEPHONE (OUTPATIENT)
Dept: FAMILY MEDICINE CLINIC | Facility: CLINIC | Age: 45
End: 2025-06-10

## 2025-06-10 VITALS
HEART RATE: 95 BPM | WEIGHT: 180 LBS | DIASTOLIC BLOOD PRESSURE: 70 MMHG | SYSTOLIC BLOOD PRESSURE: 122 MMHG | OXYGEN SATURATION: 98 % | BODY MASS INDEX: 29.95 KG/M2 | TEMPERATURE: 98.2 F

## 2025-06-10 DIAGNOSIS — M54.42 ACUTE MIDLINE LOW BACK PAIN WITH BILATERAL SCIATICA: Primary | ICD-10-CM

## 2025-06-10 DIAGNOSIS — E78.5 HYPERLIPIDEMIA, UNSPECIFIED HYPERLIPIDEMIA TYPE: ICD-10-CM

## 2025-06-10 DIAGNOSIS — Z51.81 THERAPEUTIC DRUG MONITORING: ICD-10-CM

## 2025-06-10 DIAGNOSIS — R79.89 LOW TESTOSTERONE IN MALE: ICD-10-CM

## 2025-06-10 DIAGNOSIS — E55.9 VITAMIN D DEFICIENCY: ICD-10-CM

## 2025-06-10 DIAGNOSIS — F90.0 ATTENTION DEFICIT HYPERACTIVITY DISORDER (ADHD), PREDOMINANTLY INATTENTIVE TYPE: ICD-10-CM

## 2025-06-10 DIAGNOSIS — M54.41 ACUTE MIDLINE LOW BACK PAIN WITH BILATERAL SCIATICA: Primary | ICD-10-CM

## 2025-06-10 DIAGNOSIS — N52.9 ERECTILE DYSFUNCTION, UNSPECIFIED ERECTILE DYSFUNCTION TYPE: ICD-10-CM

## 2025-06-10 DIAGNOSIS — Z71.6 ENCOUNTER FOR SMOKING CESSATION COUNSELING: ICD-10-CM

## 2025-06-10 LAB
BASOPHILS # BLD AUTO: 0.04 10*3/MM3 (ref 0–0.2)
BASOPHILS NFR BLD AUTO: 0.6 % (ref 0–1.5)
DEPRECATED RDW RBC AUTO: 42.2 FL (ref 37–54)
EOSINOPHIL # BLD AUTO: 0.1 10*3/MM3 (ref 0–0.4)
EOSINOPHIL NFR BLD AUTO: 1.5 % (ref 0.3–6.2)
ERYTHROCYTE [DISTWIDTH] IN BLOOD BY AUTOMATED COUNT: 13.1 % (ref 12.3–15.4)
HCT VFR BLD AUTO: 47.9 % (ref 37.5–51)
HGB BLD-MCNC: 16.4 G/DL (ref 13–17.7)
IMM GRANULOCYTES # BLD AUTO: 0.01 10*3/MM3 (ref 0–0.05)
IMM GRANULOCYTES NFR BLD AUTO: 0.1 % (ref 0–0.5)
LYMPHOCYTES # BLD AUTO: 1.92 10*3/MM3 (ref 0.7–3.1)
LYMPHOCYTES NFR BLD AUTO: 28.6 % (ref 19.6–45.3)
MCH RBC QN AUTO: 30.7 PG (ref 26.6–33)
MCHC RBC AUTO-ENTMCNC: 34.2 G/DL (ref 31.5–35.7)
MCV RBC AUTO: 89.5 FL (ref 79–97)
MONOCYTES # BLD AUTO: 0.58 10*3/MM3 (ref 0.1–0.9)
MONOCYTES NFR BLD AUTO: 8.6 % (ref 5–12)
NEUTROPHILS NFR BLD AUTO: 4.06 10*3/MM3 (ref 1.7–7)
NEUTROPHILS NFR BLD AUTO: 60.6 % (ref 42.7–76)
NRBC BLD AUTO-RTO: 0 /100 WBC (ref 0–0.2)
PLATELET # BLD AUTO: 226 10*3/MM3 (ref 140–450)
PMV BLD AUTO: 10.2 FL (ref 6–12)
RBC # BLD AUTO: 5.35 10*6/MM3 (ref 4.14–5.8)
WBC NRBC COR # BLD AUTO: 6.71 10*3/MM3 (ref 3.4–10.8)

## 2025-06-10 PROCEDURE — 1160F RVW MEDS BY RX/DR IN RCRD: CPT | Performed by: FAMILY MEDICINE

## 2025-06-10 PROCEDURE — 99214 OFFICE O/P EST MOD 30 MIN: CPT | Performed by: FAMILY MEDICINE

## 2025-06-10 PROCEDURE — 84403 ASSAY OF TOTAL TESTOSTERONE: CPT

## 2025-06-10 PROCEDURE — 1159F MED LIST DOCD IN RCRD: CPT | Performed by: FAMILY MEDICINE

## 2025-06-10 PROCEDURE — 36415 COLL VENOUS BLD VENIPUNCTURE: CPT

## 2025-06-10 PROCEDURE — 85025 COMPLETE CBC W/AUTO DIFF WBC: CPT

## 2025-06-10 PROCEDURE — 84402 ASSAY OF FREE TESTOSTERONE: CPT

## 2025-06-10 PROCEDURE — 1125F AMNT PAIN NOTED PAIN PRSNT: CPT | Performed by: FAMILY MEDICINE

## 2025-06-10 RX ORDER — DEXTROAMPHETAMINE SACCHARATE, AMPHETAMINE ASPARTATE, DEXTROAMPHETAMINE SULFATE AND AMPHETAMINE SULFATE 2.5; 2.5; 2.5; 2.5 MG/1; MG/1; MG/1; MG/1
10 TABLET ORAL DAILY
Qty: 30 TABLET | Refills: 0 | Status: SHIPPED | OUTPATIENT
Start: 2025-06-10

## 2025-06-10 RX ORDER — TESTOSTERONE CYPIONATE 200 MG/ML
200 INJECTION, SOLUTION INTRAMUSCULAR
Qty: 3 ML | Refills: 2 | Status: SHIPPED | OUTPATIENT
Start: 2025-06-10

## 2025-06-10 RX ORDER — CYCLOBENZAPRINE HCL 10 MG
10 TABLET ORAL 3 TIMES DAILY PRN
Qty: 90 TABLET | Refills: 1 | Status: SHIPPED | OUTPATIENT
Start: 2025-06-10

## 2025-06-10 RX ORDER — VARENICLINE TARTRATE 0.5 (11)-1
0.5 KIT ORAL DAILY
Qty: 53 EACH | Refills: 0 | Status: SHIPPED | OUTPATIENT
Start: 2025-06-10

## 2025-06-10 RX ORDER — SILDENAFIL 100 MG/1
100 TABLET, FILM COATED ORAL DAILY PRN
Qty: 30 TABLET | Refills: 2 | Status: SHIPPED | OUTPATIENT
Start: 2025-06-10

## 2025-06-10 RX ORDER — DEXTROAMPHETAMINE SACCHARATE, AMPHETAMINE ASPARTATE MONOHYDRATE, DEXTROAMPHETAMINE SULFATE AND AMPHETAMINE SULFATE 7.5; 7.5; 7.5; 7.5 MG/1; MG/1; MG/1; MG/1
30 CAPSULE, EXTENDED RELEASE ORAL EVERY MORNING
Qty: 30 CAPSULE | Refills: 0 | Status: SHIPPED | OUTPATIENT
Start: 2025-06-10
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
## 2025-06-10 NOTE — ASSESSMENT & PLAN NOTE
He is currently on testosterone injections and is able to self-administer them without issues. Blood work will be conducted to monitor his testosterone levels and ensure his blood counts are within normal range.

## 2025-06-10 NOTE — ASSESSMENT & PLAN NOTE
- Expressed desire to increase dosage to 30 mg and 10 mg, previously effective.  - Dosage will be adjusted accordingly.  - Controlled agreement updated today     The patient has read and signed the [facility] Controlled Substance Contract.  I will continue to see patient for regular follow up appointments.  They are well controlled on their medication.  TOM is updated every 3 months. The patient is aware of the potential for addiction and dependence.

## 2025-06-10 NOTE — TELEPHONE ENCOUNTER
Patient saw Dr. Jackson this morning and forgot to ask for for a rx for Chantix as he is trying to quit smoking. Can Dr. Jackson send this to his pharmacy?
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
## 2025-06-12 LAB
TESTOST FREE SERPL-MCNC: 26.5 PG/ML (ref 6.8–21.5)
TESTOST SERPL-MCNC: 1300 NG/DL (ref 264–916)

## 2025-06-17 LAB — DRUGS UR: NORMAL

## 2025-06-30 ENCOUNTER — TELEPHONE (OUTPATIENT)
Dept: FAMILY MEDICINE CLINIC | Facility: CLINIC | Age: 45
End: 2025-06-30

## 2025-06-30 RX ORDER — METHOCARBAMOL 500 MG/1
500 TABLET, FILM COATED ORAL 3 TIMES DAILY
Qty: 90 TABLET | Refills: 1 | Status: SHIPPED | OUTPATIENT
Start: 2025-06-30

## 2025-06-30 NOTE — TELEPHONE ENCOUNTER
Patient notified of providers response message. He was previously on citalopram 40mg but has been off medication for years. He has not started chantix yet.

## 2025-06-30 NOTE — TELEPHONE ENCOUNTER
Caller: Behzad Chi    Relationship: Self    Best call back number: [phone]    Which medication are you concerned about: PT WILL LIKE TO KNOW IF PCP CAN RE-PRESCRIBED HIM RX FOR ANTI DREPRESSANT, ALSO STATED THAT THE MEDICATION FOR LOWER BACK PAIN IS NOT WORKING, WILL LIKE TO KNOW IF PCP CAN PRESCRIBED ANOTHER RX, OR INCREASE THE RX

## 2025-07-01 DIAGNOSIS — Z71.6 ENCOUNTER FOR SMOKING CESSATION COUNSELING: ICD-10-CM

## 2025-07-01 RX ORDER — CITALOPRAM HYDROBROMIDE 20 MG/1
20 TABLET ORAL DAILY
Qty: 90 TABLET | Refills: 1 | Status: SHIPPED | OUTPATIENT
Start: 2025-07-01 | End: 2025-07-01 | Stop reason: SDUPTHER

## 2025-07-01 RX ORDER — CITALOPRAM HYDROBROMIDE 20 MG/1
20 TABLET ORAL DAILY
Qty: 90 TABLET | Refills: 1 | Status: SHIPPED | OUTPATIENT
Start: 2025-07-01

## 2025-07-01 NOTE — TELEPHONE ENCOUNTER
Patient notified of providers response message and verbalized understanding. He needs it sent to [facility] not [facility].

## 2025-07-09 ENCOUNTER — TELEPHONE (OUTPATIENT)
Dept: FAMILY MEDICINE CLINIC | Facility: CLINIC | Age: 45
End: 2025-07-09
Payer: COMMERCIAL

## 2025-07-09 DIAGNOSIS — M54.42 ACUTE MIDLINE LOW BACK PAIN WITH BILATERAL SCIATICA: Primary | ICD-10-CM

## 2025-07-09 DIAGNOSIS — M54.41 ACUTE MIDLINE LOW BACK PAIN WITH BILATERAL SCIATICA: Primary | ICD-10-CM

## 2025-07-09 NOTE — TELEPHONE ENCOUNTER
Caller: Behzad Chi    Relationship: Self    Best call back number:   Telephone Information:   Mobile [phone]     What is the best time to reach you: ANYTIME BEFORE 3PM    Who are you requesting to speak with (clinical staff, provider,  specific staff member): PCP    Do you know the name of the person who called:     What was the call regarding: PATIENT CALLED IN STATED HE IS HAVING SEVERE BACK PAIN AND WOULD LIKE A CALLBACK TO DISCUSS POSSIBLY BEING PLACED ON SOME PAIN MEDICATION FOR TREATMENT    Is it okay if the provider responds through MyChart: PREFERS CALL
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
## 2025-07-17 ENCOUNTER — TELEPHONE (OUTPATIENT)
Dept: FAMILY MEDICINE CLINIC | Facility: CLINIC | Age: 45
End: 2025-07-17
Payer: COMMERCIAL

## 2025-07-17 DIAGNOSIS — F90.0 ATTENTION DEFICIT HYPERACTIVITY DISORDER (ADHD), PREDOMINANTLY INATTENTIVE TYPE: ICD-10-CM

## 2025-07-17 DIAGNOSIS — N52.9 ERECTILE DYSFUNCTION, UNSPECIFIED ERECTILE DYSFUNCTION TYPE: ICD-10-CM

## 2025-07-17 DIAGNOSIS — R79.89 LOW TESTOSTERONE IN MALE: ICD-10-CM

## 2025-07-17 DIAGNOSIS — E55.9 VITAMIN D DEFICIENCY: ICD-10-CM

## 2025-07-17 RX ORDER — DEXTROAMPHETAMINE SACCHARATE, AMPHETAMINE ASPARTATE MONOHYDRATE, DEXTROAMPHETAMINE SULFATE AND AMPHETAMINE SULFATE 7.5; 7.5; 7.5; 7.5 MG/1; MG/1; MG/1; MG/1
30 CAPSULE, EXTENDED RELEASE ORAL EVERY MORNING
Qty: 30 CAPSULE | Refills: 0 | Status: CANCELLED | OUTPATIENT
Start: 2025-07-17

## 2025-07-17 RX ORDER — DEXTROAMPHETAMINE SACCHARATE, AMPHETAMINE ASPARTATE, DEXTROAMPHETAMINE SULFATE AND AMPHETAMINE SULFATE 2.5; 2.5; 2.5; 2.5 MG/1; MG/1; MG/1; MG/1
10 TABLET ORAL DAILY
Qty: 30 TABLET | Refills: 0 | Status: CANCELLED | OUTPATIENT
Start: 2025-07-17

## 2025-07-17 RX ORDER — SILDENAFIL 100 MG/1
100 TABLET, FILM COATED ORAL DAILY PRN
Qty: 30 TABLET | Refills: 2 | Status: SHIPPED | OUTPATIENT
Start: 2025-07-17

## 2025-07-17 RX ORDER — ERGOCALCIFEROL 1.25 MG/1
50000 CAPSULE, LIQUID FILLED ORAL WEEKLY
Qty: 12 CAPSULE | Refills: 0 | Status: SHIPPED | OUTPATIENT
Start: 2025-07-17

## 2025-07-17 RX ORDER — TESTOSTERONE CYPIONATE 200 MG/ML
200 INJECTION, SOLUTION INTRAMUSCULAR
Qty: 3 ML | Refills: 2 | Status: SHIPPED | OUTPATIENT
Start: 2025-07-17

## 2025-07-17 RX ORDER — DEXTROAMPHETAMINE SACCHARATE, AMPHETAMINE ASPARTATE, DEXTROAMPHETAMINE SULFATE AND AMPHETAMINE SULFATE 2.5; 2.5; 2.5; 2.5 MG/1; MG/1; MG/1; MG/1
1 TABLET ORAL DAILY
Qty: 30 TABLET | Refills: 0 | Status: SHIPPED | OUTPATIENT
Start: 2025-07-17

## 2025-07-17 RX ORDER — DEXTROAMPHETAMINE SACCHARATE, AMPHETAMINE ASPARTATE MONOHYDRATE, DEXTROAMPHETAMINE SULFATE AND AMPHETAMINE SULFATE 7.5; 7.5; 7.5; 7.5 MG/1; MG/1; MG/1; MG/1
30 CAPSULE, EXTENDED RELEASE ORAL EVERY MORNING
Qty: 30 CAPSULE | Refills: 0 | Status: SHIPPED | OUTPATIENT
Start: 2025-07-17

## 2025-07-17 NOTE — TELEPHONE ENCOUNTER
Caller: Behzad hCi    Relationship: Self    Best call back number: [phone]     Requested Prescriptions:   Requested Prescriptions     Pending Prescriptions Disp Refills    amphetamine-dextroamphetamine (Adderall) 10 MG tablet 30 tablet 0     Sig: Take 1 tablet by mouth Daily.    amphetamine-dextroamphetamine XR (Adderall XR) 30 MG 24 hr capsule 30 capsule 0     Sig: Take 1 capsule by mouth Every Morning    Testosterone Cypionate (DEPOTESTOTERONE CYPIONATE) 200 MG/ML injection 3 mL 2     Sig: Inject 1 mL into the appropriate muscle as directed by prescriber Every 14 (Fourteen) Days.    vitamin D (ERGOCALCIFEROL) 1.25 MG (69952 UT) capsule capsule 12 capsule 0     Sig: Take 1 capsule by mouth 1 (One) Time Per Week.    sildenafil (Viagra) 100 MG tablet 30 tablet 2     Sig: Take 1 tablet by mouth Daily As Needed for Erectile Dysfunction.        Pharmacy where request should be sent: [facility] [id number] [address] [facility]  AT [facility] [place] [address] - [phone]  - [phone] FX  MED [address] - [phone]  - [phone] FX     Last office visit with prescribing clinician: 6/10/2025   Last telemedicine visit with prescribing clinician: Visit date not found   Next office visit with prescribing clinician: 9/11/2025     Does the patient have less than a 3 day supply:  [x] Yes  [] No    Would you like a call back once the refill request has been completed: [] Yes [x] No    If the office needs to give you a call back, can they leave a voicemail: [] Yes [x] No    Suze Mazariegos Rep   07/17/25 10:33 EDT

## 2025-07-25 NOTE — TELEPHONE ENCOUNTER
HUB TO RELAY    LVM. Per Dr. Jackson: He doesn't need a referral to Optometry. Any local optometrist that takes his insurance is good for him to see

## 2025-07-31 DIAGNOSIS — F90.0 ATTENTION DEFICIT HYPERACTIVITY DISORDER (ADHD), PREDOMINANTLY INATTENTIVE TYPE: ICD-10-CM

## 2025-07-31 DIAGNOSIS — N52.9 ERECTILE DYSFUNCTION, UNSPECIFIED ERECTILE DYSFUNCTION TYPE: ICD-10-CM

## 2025-07-31 RX ORDER — DEXTROAMPHETAMINE SACCHARATE, AMPHETAMINE ASPARTATE MONOHYDRATE, DEXTROAMPHETAMINE SULFATE AND AMPHETAMINE SULFATE 7.5; 7.5; 7.5; 7.5 MG/1; MG/1; MG/1; MG/1
30 CAPSULE, EXTENDED RELEASE ORAL EVERY MORNING
Qty: 30 CAPSULE | Refills: 0 | OUTPATIENT
Start: 2025-07-31

## 2025-07-31 RX ORDER — DEXTROAMPHETAMINE SACCHARATE, AMPHETAMINE ASPARTATE, DEXTROAMPHETAMINE SULFATE AND AMPHETAMINE SULFATE 2.5; 2.5; 2.5; 2.5 MG/1; MG/1; MG/1; MG/1
1 TABLET ORAL DAILY
Qty: 30 TABLET | Refills: 0 | OUTPATIENT
Start: 2025-07-31

## 2025-07-31 RX ORDER — CITALOPRAM HYDROBROMIDE 40 MG/1
20 TABLET ORAL DAILY
Qty: 90 TABLET | Refills: 1 | Status: SHIPPED | OUTPATIENT
Start: 2025-07-31

## 2025-07-31 RX ORDER — SILDENAFIL 100 MG/1
100 TABLET, FILM COATED ORAL DAILY PRN
Qty: 30 TABLET | Refills: 2 | Status: CANCELLED | OUTPATIENT
Start: 2025-07-31

## 2025-07-31 NOTE — TELEPHONE ENCOUNTER
Caller: Behzad Chi    Relationship: Self    Best call back number: [phone]     Requested Prescriptions:   Requested Prescriptions     Pending Prescriptions Disp Refills    citalopram (CeleXA) 20 MG tablet 90 tablet 1     Sig: Take 1 tablet by mouth Daily.    sildenafil (Viagra) 100 MG tablet 30 tablet 2     Sig: Take 1 tablet by mouth Daily As Needed for Erectile Dysfunction.    amphetamine-dextroamphetamine (ADDERALL) 10 MG tablet 30 tablet 0     Sig: Take 1 tablet by mouth Daily.    amphetamine-dextroamphetamine XR (ADDERALL XR) 30 MG 24 hr capsule 30 capsule 0     Sig: Take 1 capsule by mouth Every Morning        Pharmacy where request should be sent: [facility] [id number] [facility] [address] [facility]  AT [facility] & [address] - [phone]  - [phone] FX  [facility] [address] [facility] - [phone]  - [phone] FX     Last office visit with prescribing clinician: 6/10/2025   Last telemedicine visit with prescribing clinician: Visit date not found   Next office visit with prescribing clinician: 9/11/2025     Additional details provided by patient: PATIENT HAS A FEW DAYS LEFT ON ALL THE MEDICATIONS. HE SAYS THAT THE CITALOPRAM IS TO BE UPPED TO 40 MG.     Does the patient have less than a 3 day supply:  [x] Yes  [] No    Would you like a call back once the refill request has been completed: [x] Yes [] No    If the office needs to give you a call back, can they leave a voicemail: [x] Yes [] No    Suze Mcmillan Rep   07/31/25 11:46 EDT

## 2025-08-18 DIAGNOSIS — F90.0 ATTENTION DEFICIT HYPERACTIVITY DISORDER (ADHD), PREDOMINANTLY INATTENTIVE TYPE: ICD-10-CM

## 2025-08-18 RX ORDER — DEXTROAMPHETAMINE SACCHARATE, AMPHETAMINE ASPARTATE MONOHYDRATE, DEXTROAMPHETAMINE SULFATE AND AMPHETAMINE SULFATE 7.5; 7.5; 7.5; 7.5 MG/1; MG/1; MG/1; MG/1
30 CAPSULE, EXTENDED RELEASE ORAL EVERY MORNING
Qty: 30 CAPSULE | Refills: 0 | Status: SHIPPED | OUTPATIENT
Start: 2025-08-18

## (undated) DEVICE — CVR PROB ULTRASND/TRANSD W/GEL 7X11IN STRL

## (undated) DEVICE — RADIFOCUS OPTITORQUE ANGIOGRAPHIC CATHETER: Brand: OPTITORQUE

## (undated) DEVICE — HI-TORQUE SUPRA CORE .035 PERIPHERAL GUIDE WIRE .035 X 300 CM: Brand: HI-TORQUE SUPRA CORE

## (undated) DEVICE — GLIDESHEATH SLENDER STAINLESS STEEL KIT: Brand: GLIDESHEATH SLENDER

## (undated) DEVICE — PK CATH CARD 10

## (undated) DEVICE — TR BAND RADIAL ARTERY COMPRESSION DEVICE: Brand: TR BAND

## (undated) DEVICE — ADULT, W/LG. BACK PAD, RADIOTRANSPARENT ELEMENT AND LEAD WIRE COMPATIBLE W/: Brand: DEFIBRILLATION ELECTRODES

## (undated) DEVICE — MODEL BT2000 P/N 700287-012KIT CONTENTS: MANIFOLD WITH SALINE AND CONTRAST PORTS, SALINE TUBING WITH SPIKE AND HAND SYRINGE, TRANSDUCER: Brand: BT2000 AUTOMATED MANIFOLD KIT

## (undated) DEVICE — CATH DIAG EXPO PIG .056IN 6F 125CM

## (undated) DEVICE — MODEL AT P65, P/N 701554-001KIT CONTENTS: HAND CONTROLLER, 3-WAY HIGH-PRESSURE STOPCOCK WITH ROTATING END AND PREMIUM HIGH-PRESSURE TUBING: Brand: ANGIOTOUCH® KIT